# Patient Record
Sex: MALE | Race: WHITE | NOT HISPANIC OR LATINO | URBAN - METROPOLITAN AREA
[De-identification: names, ages, dates, MRNs, and addresses within clinical notes are randomized per-mention and may not be internally consistent; named-entity substitution may affect disease eponyms.]

---

## 2022-12-16 ENCOUNTER — OFFICE VISIT (OUTPATIENT)
Dept: FAMILY MEDICINE | Facility: CLINIC | Age: 72
End: 2022-12-16
Payer: MEDICARE

## 2022-12-16 VITALS
OXYGEN SATURATION: 96 % | DIASTOLIC BLOOD PRESSURE: 76 MMHG | SYSTOLIC BLOOD PRESSURE: 116 MMHG | WEIGHT: 206.81 LBS | HEIGHT: 71 IN | BODY MASS INDEX: 28.95 KG/M2 | HEART RATE: 73 BPM

## 2022-12-16 DIAGNOSIS — K21.9 GASTROESOPHAGEAL REFLUX DISEASE, UNSPECIFIED WHETHER ESOPHAGITIS PRESENT: ICD-10-CM

## 2022-12-16 DIAGNOSIS — M62.838 MUSCLE SPASM: ICD-10-CM

## 2022-12-16 DIAGNOSIS — E78.2 MIXED HYPERLIPIDEMIA: ICD-10-CM

## 2022-12-16 DIAGNOSIS — M72.2 PLANTAR FASCIITIS OF LEFT FOOT: ICD-10-CM

## 2022-12-16 DIAGNOSIS — G89.29 CHRONIC PAIN OF RIGHT KNEE: ICD-10-CM

## 2022-12-16 DIAGNOSIS — I10 ESSENTIAL HYPERTENSION: Primary | ICD-10-CM

## 2022-12-16 DIAGNOSIS — M25.561 CHRONIC PAIN OF RIGHT KNEE: ICD-10-CM

## 2022-12-16 DIAGNOSIS — L98.9 SKIN LESION OF CHEEK: ICD-10-CM

## 2022-12-16 PROCEDURE — 99204 OFFICE O/P NEW MOD 45 MIN: CPT | Mod: S$PBB,,, | Performed by: PHYSICIAN ASSISTANT

## 2022-12-16 PROCEDURE — 99205 OFFICE O/P NEW HI 60 MIN: CPT | Mod: PBBFAC,PO | Performed by: PHYSICIAN ASSISTANT

## 2022-12-16 PROCEDURE — 99204 PR OFFICE/OUTPT VISIT, NEW, LEVL IV, 45-59 MIN: ICD-10-PCS | Mod: S$PBB,,, | Performed by: PHYSICIAN ASSISTANT

## 2022-12-16 PROCEDURE — 99999 PR PBB SHADOW E&M-NEW PATIENT-LVL V: CPT | Mod: PBBFAC,,, | Performed by: PHYSICIAN ASSISTANT

## 2022-12-16 PROCEDURE — 99999 PR PBB SHADOW E&M-NEW PATIENT-LVL V: ICD-10-PCS | Mod: PBBFAC,,, | Performed by: PHYSICIAN ASSISTANT

## 2022-12-16 RX ORDER — FUROSEMIDE 40 MG/1
TABLET ORAL
COMMUNITY
End: 2023-01-27 | Stop reason: SDUPTHER

## 2022-12-16 RX ORDER — PANTOPRAZOLE SODIUM 40 MG/1
40 TABLET, DELAYED RELEASE ORAL
COMMUNITY
End: 2023-01-27 | Stop reason: SDUPTHER

## 2022-12-16 RX ORDER — TIZANIDINE 4 MG/1
4 TABLET ORAL
COMMUNITY
End: 2022-12-16 | Stop reason: SDUPTHER

## 2022-12-16 RX ORDER — TIZANIDINE 4 MG/1
4 TABLET ORAL NIGHTLY PRN
Qty: 30 TABLET | Refills: 0 | Status: SHIPPED | OUTPATIENT
Start: 2022-12-16 | End: 2023-04-24 | Stop reason: SDUPTHER

## 2022-12-16 RX ORDER — ATORVASTATIN CALCIUM 20 MG/1
20 TABLET, FILM COATED ORAL
COMMUNITY
End: 2023-01-27 | Stop reason: SDUPTHER

## 2022-12-16 NOTE — Clinical Note
Call patient and try and get clarity of blood pressure medication, specifically losartan.  Also, go warn Dr. Watson's staff of his slowness, so they can schedule him before an indirect or give him 40 minutes. Thanks, Margarita Duran PA-C

## 2022-12-16 NOTE — PROGRESS NOTES
"Subjective:      Patient ID: Dayton Faye is a 72 y.o. male.    Chief Complaint: Establish Care (Needs new pcp)    Patient is new to me and clinic.    HPI  Patient here to establish care and moved here recently from [place].  Dr. Alfie Mclean was his past PCP.    HTN-controlled with losartan and lasix 40mg prn  GERD-controlled.  HLD-taking lipitor 20mg  Possibly diabetes.  Muscle spasms-takes tizanidine nightly and ultram prn.    Patient reports new skin lesion on right cheek.  Has seen dermatology in the past.    Plantar fasciitis of left heel followed by podiatry in the past.    Reports continued right knee pain.  Ortho has evaluated this in the past.    Reviewed past medical, surgical, social, and family history with patient.    Review of Systems   Constitutional:  Negative for chills and fever.   Respiratory:  Negative for shortness of breath.    Cardiovascular:  Positive for leg swelling. Negative for chest pain.   Gastrointestinal:  Negative for abdominal pain, constipation, diarrhea, nausea and vomiting.   Musculoskeletal:  Positive for back pain (intermittent).        Left plantar fasciitis.  Right knee pain.   Skin:         Right cheek skin lesion.       Objective:   /76   Pulse 73   Ht 5' 11" (1.803 m)   Wt 93.8 kg (206 lb 12.7 oz)   SpO2 96%   BMI 28.84 kg/m²     Physical Exam  Vitals reviewed.   Constitutional:       Appearance: Normal appearance. He is well-developed and well-groomed.   HENT:      Head: Normocephalic and atraumatic.      Right Ear: Hearing and external ear normal.      Left Ear: Hearing and external ear normal.      Nose: Nose normal.      Mouth/Throat:      Lips: Pink.   Eyes:      General: Lids are normal.      Conjunctiva/sclera: Conjunctivae normal.   Cardiovascular:      Rate and Rhythm: Normal rate and regular rhythm.      Heart sounds: Normal heart sounds. No murmur heard.    No friction rub. No gallop.   Pulmonary:      Effort: Pulmonary effort is normal. No respiratory " distress.      Breath sounds: Normal breath sounds. No decreased breath sounds, wheezing, rhonchi or rales.   Musculoskeletal:      Comments: Ambulates with cane.   Skin:     General: Skin is warm and dry.      Findings: No rash.   Neurological:      General: No focal deficit present.      Mental Status: He is alert and oriented to person, place, and time.   Psychiatric:         Mood and Affect: Mood normal.         Behavior: Behavior normal. Behavior is cooperative.     Assessment:      1. Essential hypertension    2. Mixed hyperlipidemia    3. Skin lesion of cheek    4. Gastroesophageal reflux disease, unspecified whether esophagitis present    5. Plantar fasciitis of left foot    6. Chronic pain of right knee    7. Muscle spasm       Plan:   1. Essential hypertension  Controlled, but will have to call patient to check dosages of medication.    2. Mixed hyperlipidemia  Will obtain records from Dr. Mclean's office.    3. Skin lesion of cheek  - Ambulatory referral/consult to Dermatology; Future    4. Gastroesophageal reflux disease, unspecified whether esophagitis present  Controlled.    5. Plantar fasciitis of left foot  - Ambulatory referral/consult to Podiatry; Future    6. Chronic pain of right knee  - Ambulatory referral/consult to Orthopedics; Future    7. Muscle spasm  Controlled.  - tiZANidine (ZANAFLEX) 4 MG tablet; Take 1 tablet (4 mg total) by mouth nightly as needed (muscle spasms).  Dispense: 30 tablet; Refill: 0    Follow up in 6 weeks with new PCP.  Patient agreed with plan and expressed understanding.    Thank you for allowing me to serve you,

## 2022-12-22 PROBLEM — M62.838 MUSCLE SPASM: Status: ACTIVE | Noted: 2022-12-22

## 2022-12-22 PROBLEM — M25.561 CHRONIC PAIN OF RIGHT KNEE: Status: ACTIVE | Noted: 2022-12-22

## 2022-12-22 PROBLEM — G89.29 CHRONIC PAIN OF RIGHT KNEE: Status: ACTIVE | Noted: 2022-12-22

## 2022-12-22 PROBLEM — I10 ESSENTIAL HYPERTENSION: Status: ACTIVE | Noted: 2022-12-22

## 2022-12-22 PROBLEM — E78.2 MIXED HYPERLIPIDEMIA: Status: ACTIVE | Noted: 2022-12-22

## 2022-12-22 PROBLEM — K21.9 GASTROESOPHAGEAL REFLUX DISEASE: Status: ACTIVE | Noted: 2022-12-22

## 2022-12-22 PROBLEM — M72.2 PLANTAR FASCIITIS OF LEFT FOOT: Status: ACTIVE | Noted: 2022-12-22

## 2022-12-28 DIAGNOSIS — M25.561 RIGHT KNEE PAIN, UNSPECIFIED CHRONICITY: Primary | ICD-10-CM

## 2023-01-04 ENCOUNTER — HOSPITAL ENCOUNTER (OUTPATIENT)
Dept: RADIOLOGY | Facility: HOSPITAL | Age: 73
Discharge: HOME | End: 2023-01-04
Attending: ORTHOPAEDIC SURGERY
Payer: MEDICARE

## 2023-01-04 ENCOUNTER — OFFICE VISIT (OUTPATIENT)
Dept: ORTHOPEDICS | Facility: CLINIC | Age: 73
End: 2023-01-04
Payer: MEDICARE

## 2023-01-04 VITALS — HEIGHT: 71 IN | WEIGHT: 206.81 LBS | BODY MASS INDEX: 28.95 KG/M2

## 2023-01-04 DIAGNOSIS — M17.10 ARTHRITIS OF KNEE: Primary | ICD-10-CM

## 2023-01-04 DIAGNOSIS — M25.561 RIGHT KNEE PAIN, UNSPECIFIED CHRONICITY: ICD-10-CM

## 2023-01-04 DIAGNOSIS — M25.561 CHRONIC PAIN OF RIGHT KNEE: ICD-10-CM

## 2023-01-04 DIAGNOSIS — Z96.652 STATUS POST TOTAL LEFT KNEE REPLACEMENT: ICD-10-CM

## 2023-01-04 DIAGNOSIS — G89.29 CHRONIC PAIN OF RIGHT KNEE: ICD-10-CM

## 2023-01-04 PROCEDURE — 73562 XR KNEE ORTHO RIGHT WITH FLEXION: ICD-10-PCS | Mod: 26,LT,, | Performed by: RADIOLOGY

## 2023-01-04 PROCEDURE — 20610 DRAIN/INJ JOINT/BURSA W/O US: CPT | Mod: PBBFAC,PN,RT | Performed by: ORTHOPAEDIC SURGERY

## 2023-01-04 PROCEDURE — 73564 XR KNEE ORTHO RIGHT WITH FLEXION: ICD-10-PCS | Mod: 26,RT,, | Performed by: RADIOLOGY

## 2023-01-04 PROCEDURE — 99204 OFFICE O/P NEW MOD 45 MIN: CPT | Mod: 25,S$PBB,, | Performed by: ORTHOPAEDIC SURGERY

## 2023-01-04 PROCEDURE — 73564 X-RAY EXAM KNEE 4 OR MORE: CPT | Mod: 26,RT,, | Performed by: RADIOLOGY

## 2023-01-04 PROCEDURE — 99204 PR OFFICE/OUTPT VISIT, NEW, LEVL IV, 45-59 MIN: ICD-10-PCS | Mod: 25,S$PBB,, | Performed by: ORTHOPAEDIC SURGERY

## 2023-01-04 PROCEDURE — 99999 PR PBB SHADOW E&M-EST. PATIENT-LVL III: CPT | Mod: PBBFAC,,, | Performed by: ORTHOPAEDIC SURGERY

## 2023-01-04 PROCEDURE — 73564 X-RAY EXAM KNEE 4 OR MORE: CPT | Mod: TC,PO,RT

## 2023-01-04 PROCEDURE — 99999 PR PBB SHADOW E&M-EST. PATIENT-LVL III: ICD-10-PCS | Mod: PBBFAC,,, | Performed by: ORTHOPAEDIC SURGERY

## 2023-01-04 PROCEDURE — 20610 LARGE JOINT ASPIRATION/INJECTION: R KNEE: ICD-10-PCS | Mod: S$PBB,RT,, | Performed by: ORTHOPAEDIC SURGERY

## 2023-01-04 PROCEDURE — 73562 X-RAY EXAM OF KNEE 3: CPT | Mod: 26,LT,, | Performed by: RADIOLOGY

## 2023-01-04 PROCEDURE — 99213 OFFICE O/P EST LOW 20 MIN: CPT | Mod: PBBFAC,PN,25 | Performed by: ORTHOPAEDIC SURGERY

## 2023-01-04 RX ORDER — ACETAMINOPHEN 500 MG
TABLET ORAL DAILY
COMMUNITY

## 2023-01-04 RX ORDER — LORATADINE AND PSEUDOEPHEDRINE SULFATE 5; 120 MG/1; MG/1
TABLET, EXTENDED RELEASE ORAL DAILY
COMMUNITY
End: 2023-01-27 | Stop reason: SDUPTHER

## 2023-01-04 RX ORDER — ASPIRIN 81 MG/1
81 TABLET ORAL
COMMUNITY
Start: 2022-06-07 | End: 2023-01-27

## 2023-01-04 RX ORDER — OXYCODONE HYDROCHLORIDE 10 MG/1
5 TABLET ORAL
COMMUNITY
Start: 2022-06-07 | End: 2023-01-27

## 2023-01-04 RX ORDER — TRIAMCINOLONE ACETONIDE 40 MG/ML
40 INJECTION, SUSPENSION INTRA-ARTICULAR; INTRAMUSCULAR
Status: DISCONTINUED | OUTPATIENT
Start: 2023-01-04 | End: 2023-01-04 | Stop reason: HOSPADM

## 2023-01-04 RX ORDER — AMLODIPINE BESYLATE 10 MG/1
1 TABLET ORAL DAILY
COMMUNITY
Start: 2022-06-08 | End: 2023-01-27

## 2023-01-04 RX ORDER — ACETAMINOPHEN 500 MG
2 TABLET ORAL EVERY 8 HOURS
COMMUNITY
Start: 2022-06-07 | End: 2023-01-27

## 2023-01-04 RX ADMIN — TRIAMCINOLONE ACETONIDE 40 MG: 40 INJECTION, SUSPENSION INTRA-ARTICULAR; INTRAMUSCULAR at 02:01

## 2023-01-04 NOTE — PROGRESS NOTES
"Past Medical History:   Diagnosis Date    GERD (gastroesophageal reflux disease)     Hyperlipidemia     Hypertension        Past Surgical History:   Procedure Laterality Date    EYE SURGERY Left     HIP SURGERY Right     KNEE SURGERY Left     SHOULDER SURGERY Right     SHOULDER SURGERY Left     SPINE SURGERY      lumbar fusion       Current Outpatient Medications   Medication Sig    acetaminophen (TYLENOL) 500 MG tablet Take 2 tablets by mouth every 8 (eight) hours.    amLODIPine (NORVASC) 10 MG tablet Take 1 tablet by mouth once daily.    aspirin (ECOTRIN) 81 MG EC tablet Take 81 mg by mouth.    oxyCODONE (ROXICODONE) 10 mg Tab immediate release tablet Take 5 mg by mouth.    atorvastatin (LIPITOR) 20 MG tablet Take 20 mg by mouth.    furosemide (LASIX) 40 MG tablet     ginkgo biloba 120 mg Tab Take by mouth once daily.    krill-om-3-dha-epa-phospho-ast 467-708-89-64 mg Cap Take by mouth once daily.    loratadine-pseudoephedrine 5-120 mg (CLARITIN-D 12 HOUR) 5-120 mg per tablet Take by mouth once daily.    losartan potassium (LOSARTAN ORAL)     mv-min-folic acid-lutein 500-250 mcg Chew Take by mouth.    pantoprazole (PROTONIX) 40 MG tablet Take 40 mg by mouth.    prednisoLONE acetate (PRED MILD) 0.12 % ophthalmic suspension Apply 1 drop to eye.    tiZANidine (ZANAFLEX) 4 MG tablet Take 1 tablet (4 mg total) by mouth nightly as needed (muscle spasms).     No current facility-administered medications for this visit.       Review of patient's allergies indicates:   Allergen Reactions    Adhesive Rash     Patients states "surgical tape."    Latex, natural rubber Rash       Family History   Problem Relation Age of Onset    Heart disease Father     Arthritis Father     No Known Problems Brother     No Known Problems Sister        Social History     Socioeconomic History    Marital status:    Tobacco Use    Smoking status: Former     Packs/day: 0.50     Years: 5.00     Pack years: 2.50     Types: Cigarettes     " Quit date:      Years since quittin.0    Smokeless tobacco: Never   Substance and Sexual Activity    Alcohol use: Yes     Comment: rarely    Drug use: Never   Social History Narrative    Lives with female partner.  Moved here from [place].       Chief Complaint:   Chief Complaint   Patient presents with    Right Knee - Pain, Initial Visit       History of present illness:  72-year-old male with a history of osteoarthritis throughout his body.  Patient had a previous left total knee done on 2022 while he lived in [place].  Patient recently moved to [place] in December.  His right knee gives him significant pain.  Pain is a 3/10.  He is had cortisone injections and viscosupplementation in the past but nothing recently.      Answers submitted by the patient for this visit:  Orthopedics Questionnaire (Submitted on 2022)  unexpected weight change: No  appetite change : No  sleep disturbance: No  IMMUNOCOMPROMISED: No  nervous/ anxious: No  dysphoric mood: No  rash: No  visual disturbance: No  eye redness: No  eye pain: No  ear pain: No  tinnitus: Yes  hearing loss: Yes  sinus pressure : No  nosebleeds: No  enviro allergies: Yes  food allergies: No  cough: No  shortness of breath: No  sweating: Yes  frequency: Yes  difficulty urinating: No  hematuria: No  chest pain: No  palpitations: No  nausea: No  vomiting: No  diarrhea: No  blood in stool: No  constipation: No  headaches: No  dizziness: No  numbness: Yes  seizures: No  joint swelling: No  myalgia: Yes  weakness: Yes  back pain: Yes   (Submitted on 2022)  Chief Complaint: Leg pain  Pain Chronicity: chronic  History of trauma: No  Onset: more than 1 month ago  Frequency: daily  Progression since onset: waxing and waning  Injury mechanism: pulling  injury location: at home  pain- numeric: 6/10  pain location: right knee, left foot  pain quality: burning  Radiating Pain: No  Aggravating factors: bearing weight, standing, sitting  fever:  No  inability to bear weight: Yes  itching: No  joint locking: No  limited range of motion: No  stiffness: No  tingling: Yes  Treatments tried: brace/corset, cold, injection treatment, rest  physical therapy: not tried  Improvement on treatment: moderate      Physical Examination:    Vital Signs:  There were no vitals filed for this visit.    Body mass index is 28.84 kg/m².    This a well-developed, well nourished patient in no acute distress.  They are alert and oriented and cooperative to examination.  Pt. walks without an antalgic gait.      Examination of the right knee shows no rashes or erythema. There are no masses ecchymosis or effusion. Patient has full range of motion from 0-130°. Patient is mildly tender to palpation over lateral joint line and moderately tender to palpation over the medial joint line. Patient has a - Lachman exam, - anterior drawer exam, and - posterior drawer exam. - Jair's exam. Knee is stable to varus and valgus stress. 5 out of 5 motor strength. Palpable distal pulses. Intact light touch sensation. Negative Patellofemoral crepitus      X-rays:  X-rays of the right knee are ordered and reviewed which show severe lateral compartment narrowing of the right knee.  Prior left total knee arthroplasty without obvious complications.     Assessment::  Right valgus knee arthritis    Plan:  I reviewed the findings with him today.  Patient is very familiar with his arthritis.  We talked about treatment options ranging from injections to knee replacement.  Would like to hold off on knee replacement until possibly the summer.  Agreed to inject his knee today with cortisone.  Talked about doing viscosupplementation again if he would does desires.    This note was created using [employer] voice recognition software that occasionally misinterpreted phrases or words.    Consult note is delivered via Epic messaging service.

## 2023-01-04 NOTE — PROCEDURES
Large Joint Aspiration/Injection: R knee    Date/Time: 1/4/2023 2:15 PM  Performed by: Fili Scott MD  Authorized by: Fili Scott MD     Consent Done?:  Yes (Verbal)  Indications:  Pain  Site marked: the procedure site was marked    Timeout: prior to procedure the correct patient, procedure, and site was verified    Local anesthetic:  Lidocaine 1% without epinephrine and bupivacaine 0.25% without epinephrine  Anesthetic total (ml):  6      Details:  Needle Size:  20 G  Approach:  Anterolateral  Location:  Knee  Site:  R knee  Medications:  40 mg triamcinolone acetonide 40 mg/mL  Patient tolerance:  Patient tolerated the procedure well with no immediate complications

## 2023-01-10 ENCOUNTER — OFFICE VISIT (OUTPATIENT)
Dept: PODIATRY | Facility: CLINIC | Age: 73
End: 2023-01-10
Payer: MEDICARE

## 2023-01-10 DIAGNOSIS — M72.2 PLANTAR FASCIITIS OF LEFT FOOT: ICD-10-CM

## 2023-01-10 PROCEDURE — 99999 PR PBB SHADOW E&M-EST. PATIENT-LVL IV: ICD-10-PCS | Mod: PBBFAC,,, | Performed by: PODIATRIST

## 2023-01-10 PROCEDURE — 99214 OFFICE O/P EST MOD 30 MIN: CPT | Mod: PBBFAC,PN | Performed by: PODIATRIST

## 2023-01-10 PROCEDURE — 99999 PR PBB SHADOW E&M-EST. PATIENT-LVL IV: CPT | Mod: PBBFAC,,, | Performed by: PODIATRIST

## 2023-01-10 PROCEDURE — 99203 PR OFFICE/OUTPT VISIT, NEW, LEVL III, 30-44 MIN: ICD-10-PCS | Mod: S$PBB,,, | Performed by: PODIATRIST

## 2023-01-10 PROCEDURE — 99203 OFFICE O/P NEW LOW 30 MIN: CPT | Mod: S$PBB,,, | Performed by: PODIATRIST

## 2023-01-10 RX ORDER — EPINEPHRINE 0.22MG
200 AEROSOL WITH ADAPTER (ML) INHALATION
COMMUNITY

## 2023-01-10 NOTE — PATIENT INSTRUCTIONS
1. Stretch calf and plantar fascia at least 3x per day for 30 sec and before getting out of bed.    2. Supportive shoes at all times (athletic shoe including coronel Beast wide, new balance 980 or 1080 wide,  HOKA bondi in extra wide or casual shoes like Dansko, Loren, Naot, Vionoic, Fit flop clog or wedge with extra heel padding and arch support. For house slippers would recommend Fitflop or Spenco found on amazon.com, Never walk barefoot or in flats.    (Varsity sports, Phidippides, [place] running company, Masseys, Goodfeet, Cantilever, Feet First, Foot Solutions, Therapeutic shoes, LoftyVistas, [facility] [employer] [facility] pro shop) http://[email or web address]    3. Orthotic (recommend the following brands: Superfeet, Spenco, Powerstep, Sof Sole Fit Series)                  4. NSAID's for 2-3 weeks if no GI or Kidney problems (example: ibuprofen 600 mg 2 x per day)    5. ICE massage with frozen water bottle 2x per day for 15 minutes.    6. Consider night splint, custom orthotics, therapy and/or steroid injection    What Is Plantar Fasciitis?   The plantar fascia is a ligament-like band running from your heel to the ball of your foot. This band pulls on the heel bone, raising the arch of your foot as it pushes off the ground. But if your foot moves incorrectly, the plantar fascia may become strained. The fascia may swell and its tiny fibers may begin to fray, causing plantar fasciitis.  Causes  Plantar fasciitis is often caused by poor foot mechanics. If your foot flattens too much, the fascia may overstretch and swell. If your foot flattens too little, the fascia may ache from being pulled too tight.    The plantar fascia is a thick, fibrous layer of tissue that covers the bones on the bottom of your foot. It holds the foot bones in an arched position. Plantar fasciitis is a painful swelling of the plantar fascia.  A heel spur is an overgrowth of bone where the plantar fascia attaches to the heel bone. The heel spur itself  usually doesnt cause pain. However, the heel spur might be a sign of plantar fasciitis which may cause your foot pain. There is no specific treatment for heel spurs.   Plantar fasciitis can develop slowly or suddenly. It usually affects one foot at a time. Heel pain can feel sharp, like a knife sticking into the bottom of your foot. You may feel pain after exercising, long-distance jogging, stair climbing, long periods of standing, or after standing up.  Risk factors for plantar fasciitis include: arthritis, diabetes, obesity or recent weight gain, flat foot, and having high arches. Wearing high heels, loose shoes, or shoes with poor arch support adds to the risk.    Foot pain is usually worse in the morning. But it improves with walking. By the end of the day there may be a dull aching. Treatment includes short-term rest and controlling inflammation. It may take up to 9 months before all symptoms go away. In rare cases, a steroid injection in the foot, or surgery, may be needed.  Home care  If you are overweight, lose weight to help healing.  Choose supportive shoes with good arch support and shock absorbency. Replace athletic shoes when they become worn out. Dont walk or run barefoot.  Premade or custom-fitted shoe inserts may be helpful. Inserts made of silicone seem to be the most effective. Custom-made inserts can be provided by a podiatrist or foot specialist, physical therapist, or orthopedist.  Premade or custom-made night splints keep the heel stretched out while you sleep. They may prevent morning pain.  Avoid activities that stress the feet: jogging, prolonged standing or walking, contact sports, etc.  First thing in the morning and before sports, stretch the bottom of your foot. Gently flex your ankle so the toes move toward your knee.  Icing may help control heel pain. Apply an ice pack to the heel for 10-20 minutes as a preventive. Or ice your heel after a severe flare-up of symptoms. You may repeat  this every 1-2 hours as needed.  You may use over-the-counter pain medicine to control pain, unless another medicine was prescribed. Anti-inflammatory pain medicines, such as ibuprofen or naproxen, may work better than acetaminophen. If you have chronic liver or kidney disease or ever had a stomach ulcer or GI bleeding, talk with your healthcare provider before using these medicines.  Shoe inserts, a night splint, or a special boot may be needed. Use these as directed by your healthcare provider.      Treating Plantar Fasciitis    First, your doctor relieves pain. Then, the cause of your problem may be found and corrected. If your pain is due to poor foot mechanics, custom-made shoe inserts (orthoses) may help.        Reduce Symptoms:  To relieve mild symptoms, try aspirin, ibuprofen, or other medications as directed. Rubbing ice on the affected area may also help.  To reduce severe pain and swelling, your doctor may prescribe pills or injections or a walking cast in some instances. Physical therapy, such as ultrasound or a daily stretching program, may also be recommended. Surgery is rarely required.  To reduce symptoms caused by poor foot mechanics, your foot may be taped. This supports the arch and temporarily controls movement. Night splints may also help by stretching the fascia.    Control Movement  If taping helps, your doctor may prescribe orthoses. Built from plaster casts of your feet, these inserts control the way your foot moves. As a result, your symptoms should go away.  If Surgery Is Needed  Your doctor may consider surgery if other types of treatment don't control your pain. During surgery, the plantar fascia is partially cut to release tension. As you heal, fibrous tissue fills the space between the heel bone and the plantar fascia.   Reduce Overuse  Every time your foot strikes the ground, the plantar fascia is stretched. You can reduce the strain on the plantar fascia and the possibility of  overuse by following these suggestions:  Lose any excess weight.  Avoid running on hard or uneven ground.  Use orthoses at all times in your shoes and house slippers.  © 7571-5585 [employer]. [address] [place] [address]. All rights reserved. This information is not intended as a substitute for professional medical care. Always follow your healthcare professional's instructions.            _                Lower Body Exercises: Calf Stretch    This exercise both stretches and strengthens your lower body to help your back. Do the exercise as often as suggested by your health care provider. As you work out, dont rush or strain. Use an exercise mat, pillow, or folded towel to protect your knees and other sensitive areas.  Face a wall 2 feet away. Step toward the wall with one foot.  Place both palms on the wall and bend your front knee.  Lean forward, keeping the back leg straight and the heel on the floor.  Hold for 20 seconds. Switch legs.  © 2860-8701 [employer]. [address] [place] [address]. All rights reserved. This information is not intended as a substitute for professional medical care. Always follow your healthcare professional's instructions.    These instructions are for your right foot. Switch sides for your left foot.  Sit in a chair. Rest your right ankle on your left knee.  Hold your toes with your right hand. Gently bend the toes backward. Feel a stretch in the undersides of the toes and ball of the foot. Hold for 30 to 60 seconds.  Then gently bend the toes in the other direction. Gently press on them until your foot is pointed. Hold for 30 to 60 seconds.  Repeat 5 times, or as instructed.  Date Last Reviewed: 5/1/2016  © 4711-1128 [employer]. [address] [place] [address]. All rights reserved. This information is not intended as a substitute for professional medical care. Always follow your healthcare professional's  instructions.

## 2023-01-11 DIAGNOSIS — I10 ESSENTIAL HYPERTENSION: ICD-10-CM

## 2023-01-25 ENCOUNTER — PATIENT MESSAGE (OUTPATIENT)
Dept: ADMINISTRATIVE | Facility: HOSPITAL | Age: 73
End: 2023-01-25
Payer: MEDICARE

## 2023-01-27 ENCOUNTER — LAB VISIT (OUTPATIENT)
Dept: LAB | Facility: HOSPITAL | Age: 73
End: 2023-01-27
Payer: MEDICARE

## 2023-01-27 ENCOUNTER — OFFICE VISIT (OUTPATIENT)
Dept: FAMILY MEDICINE | Facility: CLINIC | Age: 73
End: 2023-01-27
Payer: MEDICARE

## 2023-01-27 VITALS
SYSTOLIC BLOOD PRESSURE: 130 MMHG | HEART RATE: 76 BPM | HEIGHT: 71 IN | OXYGEN SATURATION: 96 % | BODY MASS INDEX: 28.73 KG/M2 | WEIGHT: 205.25 LBS | DIASTOLIC BLOOD PRESSURE: 80 MMHG

## 2023-01-27 DIAGNOSIS — J32.9 CHRONIC SINUSITIS, UNSPECIFIED LOCATION: ICD-10-CM

## 2023-01-27 DIAGNOSIS — R60.0 LOWER EXTREMITY EDEMA: ICD-10-CM

## 2023-01-27 DIAGNOSIS — I10 ESSENTIAL HYPERTENSION: ICD-10-CM

## 2023-01-27 DIAGNOSIS — Z00.00 WELLNESS EXAMINATION: Primary | ICD-10-CM

## 2023-01-27 DIAGNOSIS — E78.2 MIXED HYPERLIPIDEMIA: ICD-10-CM

## 2023-01-27 DIAGNOSIS — I10 PRIMARY HYPERTENSION: ICD-10-CM

## 2023-01-27 DIAGNOSIS — K21.9 GASTROESOPHAGEAL REFLUX DISEASE, UNSPECIFIED WHETHER ESOPHAGITIS PRESENT: ICD-10-CM

## 2023-01-27 DIAGNOSIS — Z00.00 WELLNESS EXAMINATION: ICD-10-CM

## 2023-01-27 DIAGNOSIS — R06.09 DYSPNEA ON EXERTION: ICD-10-CM

## 2023-01-27 PROCEDURE — 99387 INIT PM E/M NEW PAT 65+ YRS: CPT | Mod: S$PBB,GZ,, | Performed by: STUDENT IN AN ORGANIZED HEALTH CARE EDUCATION/TRAINING PROGRAM

## 2023-01-27 PROCEDURE — 80053 COMPREHEN METABOLIC PANEL: CPT | Performed by: STUDENT IN AN ORGANIZED HEALTH CARE EDUCATION/TRAINING PROGRAM

## 2023-01-27 PROCEDURE — 99213 OFFICE O/P EST LOW 20 MIN: CPT | Mod: PBBFAC,PO,25 | Performed by: STUDENT IN AN ORGANIZED HEALTH CARE EDUCATION/TRAINING PROGRAM

## 2023-01-27 PROCEDURE — 80061 LIPID PANEL: CPT | Performed by: STUDENT IN AN ORGANIZED HEALTH CARE EDUCATION/TRAINING PROGRAM

## 2023-01-27 PROCEDURE — 87389 HIV-1 AG W/HIV-1&-2 AB AG IA: CPT | Performed by: STUDENT IN AN ORGANIZED HEALTH CARE EDUCATION/TRAINING PROGRAM

## 2023-01-27 PROCEDURE — 99387 PR PREVENTIVE VISIT,NEW,65 & OVER: ICD-10-PCS | Mod: S$PBB,GZ,, | Performed by: STUDENT IN AN ORGANIZED HEALTH CARE EDUCATION/TRAINING PROGRAM

## 2023-01-27 PROCEDURE — 36415 COLL VENOUS BLD VENIPUNCTURE: CPT | Mod: PO | Performed by: STUDENT IN AN ORGANIZED HEALTH CARE EDUCATION/TRAINING PROGRAM

## 2023-01-27 PROCEDURE — 99999 PR PBB SHADOW E&M-EST. PATIENT-LVL III: CPT | Mod: PBBFAC,,, | Performed by: STUDENT IN AN ORGANIZED HEALTH CARE EDUCATION/TRAINING PROGRAM

## 2023-01-27 PROCEDURE — G0008 ADMIN INFLUENZA VIRUS VAC: HCPCS | Mod: PBBFAC,PO

## 2023-01-27 PROCEDURE — 99999 PR PBB SHADOW E&M-EST. PATIENT-LVL III: ICD-10-PCS | Mod: PBBFAC,,, | Performed by: STUDENT IN AN ORGANIZED HEALTH CARE EDUCATION/TRAINING PROGRAM

## 2023-01-27 PROCEDURE — 86803 HEPATITIS C AB TEST: CPT | Performed by: STUDENT IN AN ORGANIZED HEALTH CARE EDUCATION/TRAINING PROGRAM

## 2023-01-27 RX ORDER — PANTOPRAZOLE SODIUM 40 MG/1
40 TABLET, DELAYED RELEASE ORAL DAILY
Qty: 90 TABLET | Refills: 3 | Status: SHIPPED | OUTPATIENT
Start: 2023-01-27

## 2023-01-27 RX ORDER — ATORVASTATIN CALCIUM 20 MG/1
20 TABLET, FILM COATED ORAL DAILY
Qty: 90 TABLET | Refills: 3 | Status: SHIPPED | OUTPATIENT
Start: 2023-01-27 | End: 2024-03-04

## 2023-01-27 RX ORDER — LORATADINE AND PSEUDOEPHEDRINE SULFATE 5; 120 MG/1; MG/1
1 TABLET, EXTENDED RELEASE ORAL DAILY
Qty: 90 TABLET | Refills: 1 | Status: SHIPPED | OUTPATIENT
Start: 2023-01-27 | End: 2023-04-11

## 2023-01-27 RX ORDER — FUROSEMIDE 40 MG/1
40 TABLET ORAL DAILY PRN
Qty: 90 TABLET | Refills: 3 | Status: SHIPPED | OUTPATIENT
Start: 2023-01-27 | End: 2024-02-06

## 2023-01-27 RX ORDER — LOSARTAN POTASSIUM 100 MG/1
100 TABLET ORAL DAILY
Qty: 90 TABLET | Refills: 3 | Status: SHIPPED | OUTPATIENT
Start: 2023-01-27 | End: 2023-09-06 | Stop reason: SDUPTHER

## 2023-01-27 NOTE — ASSESSMENT & PLAN NOTE
Currently stable. I discussed the risks of long term pseudoephedrine use. Patient wishes to continue Claritin D. Encouraged to use nasal steroid and sinus rinses.

## 2023-01-27 NOTE — PROGRESS NOTES
Name: Dayton Faye  MRN: [id number]  : 1950    HPI  Patient is here is to establish care. No acute concerns.    Patient has a cardiologist from [place] where he previously lived who wanted to perform an exercise stress test for dyspnea on exertion. Patient would have to drive back to [place] for this test but was hoping to get it done here.     Review of Systems   Constitutional:  Negative for chills and fever.   HENT:  Positive for hearing loss (uses hearing aids).    Eyes:  Positive for visual disturbance (chronic, unchanged).   Respiratory:  Negative for cough and shortness of breath.    Cardiovascular:  Negative for chest pain.   Gastrointestinal:  Negative for constipation, diarrhea, nausea and vomiting.   Genitourinary:  Negative for difficulty urinating.   Musculoskeletal:  Positive for arthralgias and back pain.   Skin:  Negative for color change and rash.   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 Negative for dizziness, weakness and light-headedness.   Psychiatric/Behavioral:  Negative for dysphoric mood and sleep disturbance.       Patient Active Problem List   Diagnosis    Essential hypertension    Mixed hyperlipidemia    Gastroesophageal reflux disease    Plantar fasciitis of left foot    Chronic pain of right knee    Muscle spasm       Current Outpatient Medications on File Prior to Visit   Medication Sig Dispense Refill    amLODIPine (NORVASC) 10 MG tablet Take 1 tablet by mouth once daily.      atorvastatin (LIPITOR) 20 MG tablet Take 20 mg by mouth.      coenzyme Q10 100 mg capsule Take 200 mg by mouth.      furosemide (LASIX) 40 MG tablet       ginkgo biloba 120 mg Tab Take by mouth once daily.      krill-om-3-dha-epa-phospho-ast 520-275-80-64 mg Cap Take by mouth once daily.      loratadine-pseudoephedrine 5-120 mg (CLARITIN-D 12 HOUR) 5-120 mg per tablet Take by mouth once  daily.      losartan potassium (LOSARTAN ORAL)       mv-min-folic acid-lutein 500-250 mcg Chew Take by mouth.      pantoprazole (PROTONIX) 40 MG tablet Take 40 mg by mouth.      prednisoLONE acetate (PRED MILD) 0.12 % ophthalmic suspension Apply 1 drop to eye.      tiZANidine (ZANAFLEX) 4 MG tablet Take 1 tablet (4 mg total) by mouth nightly as needed (muscle spasms). 30 tablet 0    vit C/E/Zn/coppr/lutein/zeaxan (PRESERVISION AREDS-2 ORAL) Take by mouth 2 (two) times a day.      oxyCODONE (ROXICODONE) 10 mg Tab immediate release tablet Take 5 mg by mouth.      [DISCONTINUED] acetaminophen (TYLENOL) 500 MG tablet Take 2 tablets by mouth every 8 (eight) hours.      [DISCONTINUED] aspirin (ECOTRIN) 81 MG EC tablet Take 81 mg by mouth.       No current facility-administered medications on file prior to visit.       Past Medical History:   Diagnosis Date    GERD (gastroesophageal reflux disease)     Hyperlipidemia     Hypertension        Past Surgical History:   Procedure Laterality Date    EYE SURGERY Left     HIP SURGERY Right     JOINT REPLACEMENT      Right hip joint replaced    KNEE SURGERY Left     SHOULDER SURGERY Right     SHOULDER SURGERY Left     SPINE SURGERY      lumbar fusion        Family History   Problem Relation Age of Onset    Heart disease Father     Arthritis Father          at age 92    Hypertension Father     Vision loss Father     No Known Problems Brother     No Known Problems Sister     Cancer Paternal Grandfather         Prostate    Cancer Paternal Grandmother         Lung       Social History     Socioeconomic History    Marital status:    Tobacco Use    Smoking status: Former     Packs/day: 0.50     Years: 5.00     Pack years: 2.50     Types: Cigarettes, Pipe     Quit date: 1991     Years since quittin.0    Smokeless tobacco: Never   Substance and Sexual Activity    Alcohol use: Not Currently     Comment: Very occasionally    Drug use: Never    Sexual activity: Yes     " Partners: Female     Birth control/protection: Post-menopausal   Social History Narrative    Lives with female partner.  Moved here from [place].       Review of patient's allergies indicates:   Allergen Reactions    Adhesive Rash     Patients states "surgical tape."    Latex, natural rubber Rash        Vitals:    01/27/23 1102   BP: 130/80   Pulse: 76        Physical Exam  Constitutional:       General: He is not in acute distress.     Appearance: Normal appearance. He is well-developed.   HENT:      Head: Normocephalic and atraumatic.      Right Ear: External ear normal.      Left Ear: External ear normal.   Eyes:      Conjunctiva/sclera: Conjunctivae normal.   Cardiovascular:      Rate and Rhythm: Normal rate and regular rhythm.      Heart sounds: No murmur heard.    No friction rub. No gallop.   Pulmonary:      Effort: Pulmonary effort is normal. No respiratory distress.      Breath sounds: No wheezing, rhonchi or rales.   Abdominal:      General: Abdomen is flat. There is no distension.   Musculoskeletal:         General: No swelling or deformity.      Right lower leg: No edema.      Left lower leg: No edema.   Skin:     General: Skin is warm and dry.      Coloration: Skin is not jaundiced.   Neurological:      Mental Status: He is alert and oriented to person, place, and time. Mental status is at baseline.   Psychiatric:         Attention and Perception: Attention and perception normal.         Mood and Affect: Mood normal.         Speech: Speech normal.         Behavior: Behavior normal. Behavior is cooperative.         Thought Content: Thought content normal.         Cognition and Memory: Cognition normal.         Judgment: Judgment normal.        1. Wellness examination  -     Basic Metabolic Panel; Future; Expected date: 01/27/2023  -     Hepatitis C Antibody; Future; Expected date: 01/27/2023  -     HIV 1/2 Ag/Ab (4th Gen); Future; Expected date: 01/27/2023  -     Lipid Panel; Future; Expected date: " 01/27/2023  -     (In Office Administered) Pneumococcal Conjugate Vaccine (20 Valent) (IM)    2. Essential hypertension  Assessment & Plan:  Stable. Continue current medications.    Orders:  -     losartan (COZAAR) 100 MG tablet; Take 1 tablet (100 mg total) by mouth once daily.  Dispense: 90 tablet; Refill: 3  -     Lipid Panel; Future; Expected date: 01/27/2023    3. Mixed hyperlipidemia  Assessment & Plan:  Presumed stable. Labs today.    Orders:  -     atorvastatin (LIPITOR) 20 MG tablet; Take 1 tablet (20 mg total) by mouth once daily.  Dispense: 90 tablet; Refill: 3    4. Gastroesophageal reflux disease, unspecified whether esophagitis present  Assessment & Plan:  Stable. Continue current medication.    Orders:  -     pantoprazole (PROTONIX) 40 MG tablet; Take 1 tablet (40 mg total) by mouth once daily.  Dispense: 90 tablet; Refill: 3    5. Chronic sinusitis, unspecified location  Assessment & Plan:  Currently stable. I discussed the risks of long term pseudoephedrine use. Patient wishes to continue Claritin D. Encouraged to use nasal steroid and sinus rinses.    Orders:  -     loratadine-pseudoephedrine 5-120 mg (CLARITIN-D 12 HOUR) 5-120 mg per tablet; Take 1 tablet by mouth once daily.  Dispense: 90 tablet; Refill: 1    6. Lower extremity edema  -     furosemide (LASIX) 40 MG tablet; Take 1 tablet (40 mg total) by mouth daily as needed (swelling).  Dispense: 90 tablet; Refill: 3    7. Dyspnea on exertion  -     Stress Echo Which stress agent will be used? Treadmill Exercise; Color Flow Doppler? No; Future        Follow up in one year    Thierno Watson MD  01/27/2023

## 2023-01-28 LAB
ALBUMIN SERPL BCP-MCNC: 3.9 G/DL (ref 3.5–5.2)
ALP SERPL-CCNC: 91 U/L (ref 55–135)
ALT SERPL W/O P-5'-P-CCNC: 21 U/L (ref 10–44)
ANION GAP SERPL CALC-SCNC: 8 MMOL/L (ref 8–16)
ANION GAP SERPL CALC-SCNC: 8 MMOL/L (ref 8–16)
AST SERPL-CCNC: 27 U/L (ref 10–40)
BILIRUB SERPL-MCNC: 0.5 MG/DL (ref 0.1–1)
BUN SERPL-MCNC: 23 MG/DL (ref 8–23)
BUN SERPL-MCNC: 23 MG/DL (ref 8–23)
CALCIUM SERPL-MCNC: 9.4 MG/DL (ref 8.7–10.5)
CALCIUM SERPL-MCNC: 9.4 MG/DL (ref 8.7–10.5)
CHLORIDE SERPL-SCNC: 107 MMOL/L (ref 95–110)
CHLORIDE SERPL-SCNC: 107 MMOL/L (ref 95–110)
CHOLEST SERPL-MCNC: 170 MG/DL (ref 120–199)
CHOLEST/HDLC SERPL: 3.5 {RATIO} (ref 2–5)
CO2 SERPL-SCNC: 25 MMOL/L (ref 23–29)
CO2 SERPL-SCNC: 25 MMOL/L (ref 23–29)
CREAT SERPL-MCNC: 1.6 MG/DL (ref 0.5–1.4)
CREAT SERPL-MCNC: 1.6 MG/DL (ref 0.5–1.4)
EST. GFR  (NO RACE VARIABLE): 45.5 ML/MIN/1.73 M^2
EST. GFR  (NO RACE VARIABLE): 45.5 ML/MIN/1.73 M^2
GLUCOSE SERPL-MCNC: 66 MG/DL (ref 70–110)
GLUCOSE SERPL-MCNC: 66 MG/DL (ref 70–110)
HCV AB SERPL QL IA: NORMAL
HDLC SERPL-MCNC: 48 MG/DL (ref 40–75)
HDLC SERPL: 28.2 % (ref 20–50)
HIV 1+2 AB+HIV1 P24 AG SERPL QL IA: NORMAL
LDLC SERPL CALC-MCNC: 100.8 MG/DL (ref 63–159)
NONHDLC SERPL-MCNC: 122 MG/DL
POTASSIUM SERPL-SCNC: 4.3 MMOL/L (ref 3.5–5.1)
POTASSIUM SERPL-SCNC: 4.3 MMOL/L (ref 3.5–5.1)
PROT SERPL-MCNC: 7.3 G/DL (ref 6–8.4)
SODIUM SERPL-SCNC: 140 MMOL/L (ref 136–145)
SODIUM SERPL-SCNC: 140 MMOL/L (ref 136–145)
TRIGL SERPL-MCNC: 106 MG/DL (ref 30–150)

## 2023-01-29 NOTE — PROGRESS NOTES
Subjective:      Patient ID: Dayton Faye is a 72 y.o. male.    Chief Complaint: Foot Pain (Plantar fascitis of L foot. Pt states he has noticed pain/discomfort for the past year. Pt has done PT prior to this and started noticing concerns with Plantar fascitis. Last seen by podiatry back in late November)    Dayton is a 72 y.o. male who presents to the clinic complaining of heel pain in the left foot, especially with the first step in the morning or after rest. The pain is described as Aching and Sharp. The onset of the pain was gradual and has worsened over the past several months. Dayton rates the pain as 2/10. He denies a history of trauma. Prior treatments include rest and has done PT but pain is not better.    Review of Systems   Constitutional: Negative for chills and fever.   Cardiovascular:  Negative for claudication and leg swelling.   Respiratory:  Negative for shortness of breath.    Skin:  Negative for itching, nail changes and rash.   Musculoskeletal:  Negative for muscle cramps, muscle weakness and myalgias.        Heel pain left   Gastrointestinal:  Negative for nausea and vomiting.   Neurological:  Negative for focal weakness, loss of balance, numbness and paresthesias.         Objective:      Physical Exam  Constitutional:       General: He is not in acute distress.     Appearance: He is well-developed. He is not diaphoretic.   Cardiovascular:      Pulses:           Dorsalis pedis pulses are 2+ on the right side and 2+ on the left side.        Posterior tibial pulses are 2+ on the right side and 2+ on the left side.      Comments: < 3 sec capillary refill time to toes 1-5 bilateral. Toes and feet are warm to touch proximally with normal distal cooling b/l. There is some hair growth on the feet and toes b/l. There is no edema b/l. No spider veins or varicosities present b/l.     Musculoskeletal:      Comments: Pain on palpation plantar medial and central heel left. No pain with ROM or MMT. No pain with  medial and lateral compression of heel.     Skin:     General: Skin is warm and dry.      Coloration: Skin is not pale.      Findings: No abrasion, bruising, burn, ecchymosis, erythema, laceration, lesion, petechiae or rash.      Nails: There is no clubbing.      Comments: Skin temperature, texture and turgor within normal limits.   Neurological:      Mental Status: He is alert and oriented to person, place, and time.      Sensory: No sensory deficit.      Motor: No tremor, atrophy or abnormal muscle tone.      Comments: Negative tinel sign bilateral.   Psychiatric:         Behavior: Behavior normal.             Assessment:       Encounter Diagnosis   Name Primary?    Plantar fasciitis of left foot          Plan:       Dayton was seen today for foot pain.    Diagnoses and all orders for this visit:    Plantar fasciitis of left foot  -     Ambulatory referral/consult to Podiatry      I counseled the patient on his conditions, their implications and medical management.    Patient will stretch the tendo achilles complex three times daily as demonstrated in the office.  Literature was dispensed illustrating proper stretching technique.    Patient will obtain over the counter arch supports and wear them in shoes whenever possible.  Athletic shoes intended for walking or running are usually best.    The patient is advised to reduce or avoid barefoot or flats with ambulation    Declined injection today    Patient instructed on adequate icing techniques. Patient should ice the affected area at least once per day x 10 minutes for 10 days . I advised the  patient that extra icing would also be beneficial to ensure adequate anti inflammatory effect     Return 6 weeks for follow up    Issa Merida DPM

## 2023-01-30 PROBLEM — N18.31 STAGE 3A CHRONIC KIDNEY DISEASE: Status: ACTIVE | Noted: 2023-01-30

## 2023-02-01 ENCOUNTER — PATIENT MESSAGE (OUTPATIENT)
Dept: ADMINISTRATIVE | Facility: HOSPITAL | Age: 73
End: 2023-02-01
Payer: MEDICARE

## 2023-02-07 ENCOUNTER — PATIENT MESSAGE (OUTPATIENT)
Dept: ADMINISTRATIVE | Facility: HOSPITAL | Age: 73
End: 2023-02-07
Payer: MEDICARE

## 2023-02-20 ENCOUNTER — OFFICE VISIT (OUTPATIENT)
Dept: PODIATRY | Facility: CLINIC | Age: 73
End: 2023-02-20
Payer: MEDICARE

## 2023-02-20 DIAGNOSIS — M72.2 PLANTAR FASCIITIS OF LEFT FOOT: Primary | ICD-10-CM

## 2023-02-20 DIAGNOSIS — R26.89 BALANCE PROBLEM: ICD-10-CM

## 2023-02-20 DIAGNOSIS — R53.83 DECREASED STAMINA: ICD-10-CM

## 2023-02-20 PROCEDURE — 99213 OFFICE O/P EST LOW 20 MIN: CPT | Mod: PBBFAC,PN | Performed by: PODIATRIST

## 2023-02-20 PROCEDURE — 99999 PR PBB SHADOW E&M-EST. PATIENT-LVL III: ICD-10-PCS | Mod: PBBFAC,,, | Performed by: PODIATRIST

## 2023-02-20 PROCEDURE — 99213 PR OFFICE/OUTPT VISIT, EST, LEVL III, 20-29 MIN: ICD-10-PCS | Mod: S$PBB,,, | Performed by: PODIATRIST

## 2023-02-20 PROCEDURE — 99999 PR PBB SHADOW E&M-EST. PATIENT-LVL III: CPT | Mod: PBBFAC,,, | Performed by: PODIATRIST

## 2023-02-20 PROCEDURE — 99213 OFFICE O/P EST LOW 20 MIN: CPT | Mod: S$PBB,,, | Performed by: PODIATRIST

## 2023-02-20 NOTE — PROGRESS NOTES
Subjective:      Patient ID: Dayton Faye is a 72 y.o. male.    Chief Complaint: Follow-up    Dayton is a 72 y.o. male who presents to the clinic complaining of heel pain in the left foot, especially with the first step in the morning or after rest. The pain is described as Aching and Sharp. The onset of the pain was gradual and has worsened over the past several months. Dayton rates the pain as 2/10. He denies a history of trauma. Prior treatments include rest and has done PT but pain is not better.    2/20/23: Patient returns has been doing stretching and got new shoes, feels much better but pain is still there and has some weakness and stamina issues with walking and standing    2/20/23: Patient returns for follow up left heel pain also relates to some imbalance at times    Review of Systems   Constitutional: Negative for chills and fever.   Cardiovascular:  Negative for claudication and leg swelling.   Respiratory:  Negative for shortness of breath.    Skin:  Negative for itching, nail changes and rash.   Musculoskeletal:  Negative for muscle cramps, muscle weakness and myalgias.        Heel pain left   Gastrointestinal:  Negative for nausea and vomiting.   Neurological:  Negative for focal weakness, loss of balance, numbness and paresthesias.         Objective:      Physical Exam  Constitutional:       General: He is not in acute distress.     Appearance: He is well-developed. He is not diaphoretic.   Cardiovascular:      Pulses:           Dorsalis pedis pulses are 2+ on the right side and 2+ on the left side.        Posterior tibial pulses are 2+ on the right side and 2+ on the left side.      Comments: < 3 sec capillary refill time to toes 1-5 bilateral. Toes and feet are warm to touch proximally with normal distal cooling b/l. There is some hair growth on the feet and toes b/l. There is no edema b/l. No spider veins or varicosities present b/l.     Musculoskeletal:      Comments: Pain on palpation plantar medial  and central heel left. No pain with ROM or MMT. No pain with medial and lateral compression of heel.     Skin:     General: Skin is warm and dry.      Coloration: Skin is not pale.      Findings: No abrasion, bruising, burn, ecchymosis, erythema, laceration, lesion, petechiae or rash.      Nails: There is no clubbing.      Comments: Skin temperature, texture and turgor within normal limits.   Neurological:      Mental Status: He is alert and oriented to person, place, and time.      Sensory: No sensory deficit.      Motor: No tremor, atrophy or abnormal muscle tone.      Comments: Negative tinel sign bilateral.   Psychiatric:         Behavior: Behavior normal.             Assessment:       Encounter Diagnoses   Name Primary?    Plantar fasciitis of left foot Yes    Decreased stamina     Balance problem          Plan:       Dayton was seen today for follow-up.    Diagnoses and all orders for this visit:    Plantar fasciitis of left foot  -     Ambulatory referral/consult to Physical/Occupational Therapy; Future    Decreased stamina  -     Ambulatory referral/consult to Physical/Occupational Therapy; Future    Balance problem  -     Ambulatory referral/consult to Physical/Occupational Therapy; Future    I counseled the patient on his conditions, their implications and medical management.    Patient will continue to stretch the tendo achilles complex three times daily as demonstrated in the office.  Literature was dispensed illustrating proper stretching technique.    The patient is advised to reduce or avoid barefoot or flats with ambulation        Will try PT again for the plantar fasciitis and the balance/stamina issues    Return 2 months for follow up    Issa Merida DPM

## 2023-02-27 ENCOUNTER — CLINICAL SUPPORT (OUTPATIENT)
Dept: REHABILITATION | Facility: HOSPITAL | Age: 73
End: 2023-02-27
Attending: PODIATRIST
Payer: MEDICARE

## 2023-02-27 DIAGNOSIS — M72.2 PLANTAR FASCIITIS OF LEFT FOOT: ICD-10-CM

## 2023-02-27 DIAGNOSIS — R53.83 DECREASED STAMINA: ICD-10-CM

## 2023-02-27 DIAGNOSIS — R26.89 BALANCE PROBLEM: ICD-10-CM

## 2023-02-27 PROCEDURE — 97110 THERAPEUTIC EXERCISES: CPT | Mod: PO

## 2023-02-27 NOTE — PLAN OF CARE
"[facility]   Physical Therapy Initial Evaluation     Date: 2/27/2023   Name: Dayton Faye  Clinic Number: [id number]    Therapy Diagnosis:   Encounter Diagnoses   Name Primary?    Plantar fasciitis of left foot     Decreased stamina     Balance problem      Physician: Issa Merida DPM    Physician Orders: PT Eval and Treat   Medical Diagnosis from Referral: M72.2 (ICD-10-CM) - Plantar fasciitis of left foot R53.83 (ICD-10-CM) - Decreased stamina R26.89 (ICD-10-CM) - Balance problem   Evaluation Date: 2/27/2023  Authorization Period Expiration: 04/24/23  Plan of Care Expiration: 04/24/23  Progress Note Due: 03/27/23  Visit # / Visits authorized: 1/ 1   FOTO: 1/3    Precautions: Standard     Time In: 1132  Time Out: 1215  Total Appointment Time (timed & untimed codes): 43 minutes      SUBJECTIVE   Date of onset: 1 yr ago pain in left ( knee surgery June 2022) achilles partial tear fall 2021    History of current condition - GALLITO reports: was getting therapy for achilles in 2021 that he had to stop due to left shoulder surgery. Started to have heel pain sometime after. Started to wear newer supportive shoes following follow up with podiatrist. Rates walking tolerance ~10 minutes, standing tolerance 1 hr. Planned trip to [place] in May of this where he hopes to walk without pain Rates balance as "tricky" able to ascend/descend stairs with handrail. Incline to driveway is hard. Has SPC but stopped using.      Falls: - in past 3 months.     Imaging, bone scan films: patellar enthesophyte.     Impression:     Postsurgical changes of total left knee arthroplasty without evidence of hardware complication.     Bicompartmental osteoarthrosis of the right knee worse in the medial and patellofemoral compartments.        Electronically signed by: Amy Pratt  Date:                                            01/04/2023    Prior Therapy: yes 2021 achilles   Social History:  lives with a friend/partner " "  Occupation: retired   Prior Level of Function: 2021 community distance  Current Level of Function: short to mod distance     Pain:  Current 3/10, worst 10/10, best 3/10   Location: left feet/heel medial side        Description: Burning  Aggravating Factors: standing on hard surfaces, sitting for long time, walking   Easing Factors: pain medication    Patients goals: walk with reduce pain and improve his stamina     Medical History:   Past Medical History:   Diagnosis Date    GERD (gastroesophageal reflux disease)     Hyperlipidemia     Hypertension        Surgical History:   Dayton Faye  has a past surgical history that includes Spine surgery; Knee surgery (Left); Hip surgery (Right); Shoulder surgery (Right); Shoulder surgery (Left); Retinal detachment surgery (Left); Joint replacement (2016); and Cataract extraction.    Medications:   Dayton has a current medication list which includes the following prescription(s): atorvastatin, coenzyme q10, furosemide, ginkgo biloba, krill-om-3-dha-epa-phospho-ast, claritin-d 12 hour, losartan, mv-min-folic acid-lutein, pantoprazole, prednisolone acetate, tizanidine, and vit c/e/zn/coppr/lutein/zeaxan.    Allergies:   Review of patient's allergies indicates:   Allergen Reactions    Adhesive Rash     Patients states "surgical tape."    Latex, natural rubber Rash        Objective     Observation:       Posture: fair      Gait: (antalgic) decreased weight on L side during gait     ROM:  Ankle Left Right   Dorsiflexion 15 (20) degrees 11 (12) degrees   Plantarflexion 35 (37) degrees 35 (40) degrees   Inversion 25 (25) degrees 25 (25) degrees   Eversion 18 (20) degrees 35 (35) degrees     MMT:  Right LE  Left LE    Knee extension: 5/5 Knee extension: 5/5   Knee flexion: 5/5 Knee flexion: 5/5   Hip flexion: 4+/5 Hip flexion: 4+/5         Ankle Left Right   Dorsiflexion 5/5 5/5   Plantarflexion 4/5 5/5   Inversion 4+/5 4+/5   Eversion 4/5 4/5     Special Tests:    Ankle Left Right "   Windlass test Positive Negative     Joint Mobility: NT    Palpation: Medial mid heel + increased soft tissue/bone?, pain along proximal planter fascia     Sensation: wnl     Flexibility: restricted plantar fascia         CMS Impairment/Limitation/Restriction for FOTO foot Survey    Therapist reviewed FOTO scores for Dayton Faye on 2/27/2023.   FOTO documents entered into [employer] - see Media section.    Limitation Score: 67%  Category: Mobility         TREATMENT     Total Treatment time separate from Evaluation: 8 minutes    GALLITO received therapeutic exercises to develop strength, ROM, and flexibility for 8 minutes including:  Patient education: Discussed current condition, prognosis, plan of care. Demonstration and practice of Home Exercise Program.   Home Exercises and Patient Education Provided    Education provided:   - Role of PT, PT POC    Written Home Exercises Provided: yes.  Exercises were reviewed and GALLITO was able to demonstrate them prior to the end of the session.  GALLITO demonstrated good  understanding of the education provided.     See EMR under Patient Instructions for exercises provided 2/27/2023.    Assessment   Dayton is a 72 y.o. male referred to outpatient Physical Therapy with a medical diagnosis of M72.2 (ICD-10-CM) - Plantar fasciitis of left foot R53.83 (ICD-10-CM) - Decreased stamina R26.89 (ICD-10-CM) - Balance problem . Physical exam is consistent with referring diagnosis, balance assessment not performed today due to time. Will assess at next visit. Primary impairments include AROM, PROM, joint mobility, strength, balance, soft tissue restrictions, and pain which limits functional mobility. This pt is a good candidate for skilled PT tx and stands to benefit from a combination of manual therapy including joint mobilizations with trigger point/myofacscial release, therapeutic exercise to establish core/joint stability, neuromuscular re-education, dry needling and modalities Prn. The pt has  been educated on their dx/POC and consents to further PT tx.    Pt prognosis is Good.   Pt will benefit from skilled outpatient Physical Therapy to address the deficits stated above and in the chart below, provide pt/family education, and to maximize pt's level of independence.     Plan of care discussed with patient: Yes  Pt's spiritual, cultural and educational needs considered and patient is agreeable to the plan of care and goals as stated below:     Anticipated Barriers for therapy: none    Medical Necessity is demonstrated by the following  History  Co-morbidities and personal factors that may impact the plan of care Co-morbidities:   GERD (gastroesophageal reflux disease)   Hyperlipidemia   Hypertension       Personal Factors:   no deficits     low   Examination  Body Structures and Functions, activity limitations and participation restrictions that may impact the plan of care Body Regions:   lower extremities    Body Systems:    gross symmetry  ROM  strength    Participation Restrictions:   none    Activity limitations:   Learning and applying knowledge  no deficits    General Tasks and Commands  no deficits    Communication  no deficits    Mobility  lifting and carrying objects  walking    Self care  no deficits    Domestic Life  no deficits    Interactions/Relationships  no deficits    Life Areas  no deficits    Community and Social Life  no deficits         low   Clinical Presentation evolving clinical presentation with changing clinical characteristics low   Decision Making/ Complexity Score: low     Goals:  Short-Term Goals: 4 weeks  1) The patient will be independent and compliant with initial home exercise program as prescribed by physical therapist.  2) The patient will report pain at worst in past week as 7/10 or less  3) The patient will increase strength in MMT of the left ankle ankle to 4+/5 in order to demonstrate improvements in functional strength for weight-bearing and gait.      Long-Term  Goals: 8 weeks  1) Pt to achieve <50% limitation as measured by the FOTO to demonstrate decreased disability.  2) The patient will report pain at worst in past week as 7/10 or less  3) The patient will increase strength in MMT of the left ankle to 5/5 in order to demonstrate improvements in functional strength for weight-bearing and gait.  4) patient will ascend/descend stairs with normal and reciprocal gait pattern with 1 handrail or less  5) patient will ambulate with normal and reciprocal gait pattern   6) patient will report no pain with ambulation with shoes on level ground for 15 minutes or more.       Plan   Plan of care Certification: 2/27/2023 to 04/24/23.    Outpatient Physical Therapy 2 times weekly for 8 weeks to include the following interventions: Gait Training, Manual Therapy, Moist Heat/ Ice, Neuromuscular Re-ed, Patient Education, Therapeutic Activities, Therapeutic Exercise, Ultrasound, and modalities PRN .     Bandar Ling, PT      I CERTIFY THE NEED FOR THESE SERVICES FURNISHED UNDER THIS PLAN OF TREATMENT AND WHILE UNDER MY CARE   Physician's comments:     Physician's Signature: ___________________________________________________

## 2023-02-27 NOTE — PATIENT INSTRUCTIONS
Access Code: [email or web address]  URL: [email or web address]  Date: 02/27/2023  Prepared by: Bandar Medina Notes  - left foot resting on knee, pull foot and toes back till you feel a stretch. 3 x 30s   - step on band with bottom of foot, curl toes back towards ground 2 x 10       Exercises  Long Sitting Calf Stretch with Strap - 2 x daily - 7 x weekly - 2 sets - 10 reps  Seated Plantar Fascia Mobilization with Small Ball - 2 x daily - 7 x weekly - 2 sets - 10 reps

## 2023-03-06 ENCOUNTER — CLINICAL SUPPORT (OUTPATIENT)
Dept: REHABILITATION | Facility: HOSPITAL | Age: 73
End: 2023-03-06
Attending: PODIATRIST
Payer: MEDICARE

## 2023-03-06 DIAGNOSIS — M72.2 PLANTAR FASCIITIS OF LEFT FOOT: Primary | ICD-10-CM

## 2023-03-06 PROCEDURE — 97035 APP MDLTY 1+ULTRASOUND EA 15: CPT | Mod: PO

## 2023-03-06 PROCEDURE — 97140 MANUAL THERAPY 1/> REGIONS: CPT | Mod: PO

## 2023-03-06 PROCEDURE — 97110 THERAPEUTIC EXERCISES: CPT | Mod: PO

## 2023-03-06 NOTE — PROGRESS NOTES
[facility]   Physical Therapy Treatment Note     Name: Dayton Faye  Clinic Number: [id number]    Therapy Diagnosis:   Encounter Diagnosis   Name Primary?    Plantar fasciitis of left foot Yes     Physician: Issa Merida DPM    Visit Date: 3/6/2023       Physician Orders: PT Eval and Treat   Medical Diagnosis from Referral: M72.2 (ICD-10-CM) - Plantar fasciitis of left foot R53.83 (ICD-10-CM) - Decreased stamina R26.89 (ICD-10-CM) - Balance problem   Evaluation Date: 2/27/2023  Authorization Period Expiration: 04/24/23  Plan of Care Expiration: 04/24/23  Progress Note Due: 03/27/23  Visit # / Visits authorized: 2/20  FOTO: 1/3     Precautions: Standard     PTA Visit #: 0/5     Time In: 1043  Time Out: 1130  Total Billable Time: 47 minutes      SUBJECTIVE     Pt reports: has been doing stretching HEP religiously except ice water bottle massage. States feels like it's getting better.   He was compliant with home exercise program.  Response to previous treatment: fine   Functional change: too soon tell     Pain: 0/10  Location: left heel      OBJECTIVE     Objective Measures updated at progress report unless specified.     Treatment     GALLITO received the treatments listed below:      therapeutic exercises to develop strength, endurance, and ROM for 15  minutes including:  Recumbent bike L4 5 minutes   Gastroc stretch on board 3 x 30s   Foot intrinsics 2 x 20 with Green TB.   Plantar fascia stretch 3 x 30s    manual therapy techniques: Soft tissue Mobilization were applied to the: Left foot for 17 minutes, including:  STM to left plantar fascia  Manual PF stretch and gastroc stretch 2 x30 s   Posterior glide to talus Gr 3-4    direct contact modalities after being cleared for contraindications: Ultrasound:  Dayton received ultrasound to manage pain and inflammation at 50 % duty cycle applied to the 1.0 at an intensity of  number entry box W/cm2  for a duration of 15 minutes. Patient  tolerated treatment well without adverse effects. Therapist was in attendance throughout intervention.            Patient Education and Home Exercises     Home Exercises Provided and Patient Education Provided     Education provided:   - Reviewed     Written Home Exercises Provided: Patient instructed to cont prior HEP. Exercises were reviewed and GALLITO was able to demonstrate them prior to the end of the session.  GALLITO demonstrated good  understanding of the education provided. See EMR under Patient Instructions for exercises provided during therapy sessions    ASSESSMENT     Corrected stretching HEP. Patient tolerated manual stretching and STM to left ventral heel well. Patient had reports no pain or discomfort with US to ventral heel.     GALLITO Is progressing well towards his goals.   Pt prognosis is Good.     Pt will continue to benefit from skilled outpatient physical therapy to address the deficits listed in the problem list box on initial evaluation, provide pt/family education and to maximize pt's level of independence in the home and community environment.     Pt's spiritual, cultural and educational needs considered and pt agreeable to plan of care and goals.     Anticipated barriers to physical therapy: none     Goals:  Short-Term Goals: 4 weeks  1) The patient will be independent and compliant with initial home exercise program as prescribed by physical therapist.  2) The patient will report pain at worst in past week as 7/10 or less  3) The patient will increase strength in MMT of the left ankle ankle to 4+/5 in order to demonstrate improvements in functional strength for weight-bearing and gait.        Long-Term Goals: 8 weeks  1) Pt to achieve <50% limitation as measured by the FOTO to demonstrate decreased disability.  2) The patient will report pain at worst in past week as 7/10 or less  3) The patient will increase strength in MMT of the left ankle to 5/5 in order to demonstrate improvements in  functional strength for weight-bearing and gait.  4) patient will ascend/descend stairs with normal and reciprocal gait pattern with 1 handrail or less  5) patient will ambulate with normal and reciprocal gait pattern   6) patient will report no pain with ambulation with shoes on level ground for 15 minutes or more.        PLAN     L foot stregthengin as tolerated, Stretching, MT , US     Bandar Ling, PT

## 2023-03-08 ENCOUNTER — CLINICAL SUPPORT (OUTPATIENT)
Dept: REHABILITATION | Facility: HOSPITAL | Age: 73
End: 2023-03-08
Attending: PODIATRIST
Payer: MEDICARE

## 2023-03-08 DIAGNOSIS — M72.2 PLANTAR FASCIITIS OF LEFT FOOT: Primary | ICD-10-CM

## 2023-03-08 PROCEDURE — 97140 MANUAL THERAPY 1/> REGIONS: CPT | Mod: PO

## 2023-03-08 PROCEDURE — 97035 APP MDLTY 1+ULTRASOUND EA 15: CPT | Mod: PO

## 2023-03-08 PROCEDURE — 97110 THERAPEUTIC EXERCISES: CPT | Mod: PO

## 2023-03-08 NOTE — PROGRESS NOTES
[facility]   Physical Therapy Treatment Note     Name: Dayton Faye  Clinic Number: [id number]    Therapy Diagnosis:   Encounter Diagnosis   Name Primary?    Plantar fasciitis of left foot Yes     Physician: Issa Merida DPM    Visit Date: 3/8/2023       Physician Orders: PT Eval and Treat   Medical Diagnosis from Referral: M72.2 (ICD-10-CM) - Plantar fasciitis of left foot R53.83 (ICD-10-CM) - Decreased stamina R26.89 (ICD-10-CM) - Balance problem   Evaluation Date: 2/27/2023  Authorization Period Expiration: 04/24/23  Plan of Care Expiration: 04/24/23  Progress Note Due: 03/27/23  Visit # / Visits authorized: 3/20  FOTO: 1/3     Precautions: Standard     PTA Visit #: 0/5     Time In: 1000  Time Out: 1045  Total Billable Time: 45 minutes      SUBJECTIVE     Pt reports: states he got more cramps in PM following Monday treatment. Foot/heel felt pretty good, slight burning sensation in Tuesday that didn't last.   He was compliant with home exercise program.  Response to previous treatment: fine   Functional change: too soon tell     Pain: 0/10  Location: left heel      OBJECTIVE     Objective Measures updated at progress report unless specified.     Treatment     Santino received the treatments listed below:      therapeutic exercises to develop strength, endurance, and ROM for 20  minutes including:  Recumbent bike L1 5 minutes   Gastroc stretch on board 3 x 30s   HS stretch 3 x 30s   Foot intrinsics 2 x 20 with Green TB.   Plantar fascia stretch 3 x 30s    manual therapy techniques: Soft tissue Mobilization were applied to the: Left foot for 10 minutes, including:  STM to left plantar fascia  Manual PF stretch and gastroc stretch 2 x30 s   Posterior glide to talus Gr 3-4    direct contact modalities after being cleared for contraindications: Ultrasound:  Dayton received ultrasound to manage pain and inflammation at 50 % duty cycle applied to the 1.0 at an intensity of  number entry box  W/cm2  for a duration of 15 minutes. Patient tolerated treatment well without adverse effects. Therapist was in attendance throughout intervention.            Patient Education and Home Exercises     Home Exercises Provided and Patient Education Provided     Education provided:   - Reviewed     Written Home Exercises Provided: Patient instructed to cont prior HEP. Exercises were reviewed and Santino was able to demonstrate them prior to the end of the session.  Santino demonstrated good  understanding of the education provided. See EMR under Patient Instructions for exercises provided during therapy sessions    ASSESSMENT     Continued with MT and US that patient tolerated well. Patient past two visits pain at start have been 0/10. Discussed with patient if he continues to have low symptoms will consider early discharge.      Santino Is progressing well towards his goals.   Pt prognosis is Good.     Pt will continue to benefit from skilled outpatient physical therapy to address the deficits listed in the problem list box on initial evaluation, provide pt/family education and to maximize pt's level of independence in the home and community environment.     Pt's spiritual, cultural and educational needs considered and pt agreeable to plan of care and goals.     Anticipated barriers to physical therapy: none     Goals:  Short-Term Goals: 4 weeks  1) The patient will be independent and compliant with initial home exercise program as prescribed by physical therapist.  2) The patient will report pain at worst in past week as 7/10 or less  3) The patient will increase strength in MMT of the left ankle ankle to 4+/5 in order to demonstrate improvements in functional strength for weight-bearing and gait.        Long-Term Goals: 8 weeks  1) Pt to achieve <50% limitation as measured by the FOTO to demonstrate decreased disability.  2) The patient will report pain at worst in past week as 7/10 or less  3) The patient will increase  strength in MMT of the left ankle to 5/5 in order to demonstrate improvements in functional strength for weight-bearing and gait.  4) patient will ascend/descend stairs with normal and reciprocal gait pattern with 1 handrail or less  5) patient will ambulate with normal and reciprocal gait pattern   6) patient will report no pain with ambulation with shoes on level ground for 15 minutes or more.        PLAN     L foot strengthening as tolerated, Stretching, MT , US     Bandar Ling, PT

## 2023-03-14 ENCOUNTER — CLINICAL SUPPORT (OUTPATIENT)
Dept: REHABILITATION | Facility: HOSPITAL | Age: 73
End: 2023-03-14
Attending: PODIATRIST
Payer: MEDICARE

## 2023-03-14 DIAGNOSIS — M72.2 PLANTAR FASCIITIS OF LEFT FOOT: Primary | ICD-10-CM

## 2023-03-14 PROCEDURE — 97140 MANUAL THERAPY 1/> REGIONS: CPT | Mod: PO,CQ

## 2023-03-14 PROCEDURE — 97110 THERAPEUTIC EXERCISES: CPT | Mod: PO,CQ

## 2023-03-14 PROCEDURE — 97112 NEUROMUSCULAR REEDUCATION: CPT | Mod: PO,CQ

## 2023-03-14 NOTE — PROGRESS NOTES
[facility]   Physical Therapy Treatment Note     Name: Dayton Faye  Clinic Number: [id number]    Therapy Diagnosis:   Encounter Diagnosis   Name Primary?    Plantar fasciitis of left foot Yes     Physician: Issa Merida DPM    Visit Date: 3/14/2023       Physician Orders: PT Eval and Treat   Medical Diagnosis from Referral: M72.2 (ICD-10-CM) - Plantar fasciitis of left foot R53.83 (ICD-10-CM) - Decreased stamina R26.89 (ICD-10-CM) - Balance problem   Evaluation Date: 2/27/2023  Authorization Period Expiration: 04/24/23  Plan of Care Expiration: 04/24/23  Progress Note Due: 03/27/23  Visit # / Visits authorized: 3/20  FOTO: 1/3     Precautions: Standard     PTA Visit #: 1/5     Time In: 1030 AM  Time Out: 1118 AM  Total Billable Time: 48 minutes    SUBJECTIVE     Pt reports: his (L) foot is doing well. Patient states his first few steps in the morning aren't as painful but he feels he does have a balance issue. He was compliant with home exercise program.  Response to previous treatment: fine   Functional change: too soon tell     Pain: 0/10  Location: left heel      OBJECTIVE     Objective Measures updated at progress report unless specified.     Treatment     Santino received the treatments listed below:      therapeutic exercises to develop strength, endurance, and ROM for 20 minutes including:  Recumbent bike L1 5 minutes   Gastroc stretch on board 3 x 30s   HS stretch (standing at stairs) 3 x 30s   Foot intrinsics 2 x 20 with Green TB.   Plantar fascia stretch 3 x 30s  Heel raises 2x5    manual therapy techniques: Soft tissue Mobilization were applied to the: Left foot for 10 minutes, including:  STM to left plantar fascia  Manual PF stretch and gastroc stretch 2 x30 s   Posterior glide to talus Gr 3-4    direct contact modalities after being cleared for contraindications: Ultrasound:  Dayton received ultrasound to manage pain and inflammation at 50 % duty cycle applied to the 1.0 at  an intensity of  number entry box W/cm2 for a duration of 00 minutes. Patient tolerated treatment well without adverse effects. Therapist was in attendance throughout intervention.    Neuromuscular re-education to improve balance, coordination, and proprioception for 10 minutes:  Ankle circles: CW and CCW x 15 ea direction   Narrow base on ground x 30 se x 2  Narrow base on air ex pad x 30 sec x 3  Foot taps on 4 inch step x 30 sec x 3 (HHA)   Stair training x 5 minutes    Patient Education and Home Exercises     Home Exercises Provided and Patient Education Provided     Education provided:   - Reviewed     Written Home Exercises Provided: Patient instructed to cont prior HEP. Exercises were reviewed and Santino was able to demonstrate them prior to the end of the session.  Santino demonstrated good  understanding of the education provided. See EMR under Patient Instructions for exercises provided during therapy sessions    ASSESSMENT     Santino able to progress balance focused activities without complaints of (L) foot pain. Patient challenged by these exercises especially step taps requiring UE assist. No complaints of pain with manual therapy. (L) heel raise difficult for patient due to weakness but this may be related to previous achilles injury.    Santino Is progressing well towards his goals.   Pt prognosis is Good.     Pt will continue to benefit from skilled outpatient physical therapy to address the deficits listed in the problem list box on initial evaluation, provide pt/family education and to maximize pt's level of independence in the home and community environment.     Pt's spiritual, cultural and educational needs considered and pt agreeable to plan of care and goals.     Anticipated barriers to physical therapy: none     Goals:  Short-Term Goals: 4 weeks  1) The patient will be independent and compliant with initial home exercise program as prescribed by physical therapist.  2) The patient will report  pain at worst in past week as 7/10 or less  3) The patient will increase strength in MMT of the left ankle ankle to 4+/5 in order to demonstrate improvements in functional strength for weight-bearing and gait.        Long-Term Goals: 8 weeks  1) Pt to achieve <50% limitation as measured by the FOTO to demonstrate decreased disability.  2) The patient will report pain at worst in past week as 7/10 or less  3) The patient will increase strength in MMT of the left ankle to 5/5 in order to demonstrate improvements in functional strength for weight-bearing and gait.  4) patient will ascend/descend stairs with normal and reciprocal gait pattern with 1 handrail or less  5) patient will ambulate with normal and reciprocal gait pattern   6) patient will report no pain with ambulation with shoes on level ground for 15 minutes or more.        PLAN     L foot strengthening as tolerated, Stretching, MT , US     Rachel Oden, PTA

## 2023-03-17 ENCOUNTER — CLINICAL SUPPORT (OUTPATIENT)
Dept: REHABILITATION | Facility: HOSPITAL | Age: 73
End: 2023-03-17
Attending: PODIATRIST
Payer: MEDICARE

## 2023-03-17 DIAGNOSIS — M72.2 PLANTAR FASCIITIS OF LEFT FOOT: Primary | ICD-10-CM

## 2023-03-17 PROCEDURE — 97112 NEUROMUSCULAR REEDUCATION: CPT | Mod: PO

## 2023-03-17 PROCEDURE — 97110 THERAPEUTIC EXERCISES: CPT | Mod: PO

## 2023-03-17 PROCEDURE — 97140 MANUAL THERAPY 1/> REGIONS: CPT | Mod: PO

## 2023-03-17 NOTE — PROGRESS NOTES
[facility]   Physical Therapy Treatment Note     Name: Dayton Faye  Clinic Number: [id number]    Therapy Diagnosis:   Encounter Diagnosis   Name Primary?    Plantar fasciitis of left foot Yes     Physician: Issa Merida DPM    Visit Date: 3/17/2023       Physician Orders: PT Eval and Treat   Medical Diagnosis from Referral: M72.2 (ICD-10-CM) - Plantar fasciitis of left foot R53.83 (ICD-10-CM) - Decreased stamina R26.89 (ICD-10-CM) - Balance problem   Evaluation Date: 2/27/2023  Authorization Period Expiration: 04/24/23  Plan of Care Expiration: 04/24/23  Progress Note Due: 03/27/23  Visit # / Visits authorized: 3/20  FOTO: 1/3     Precautions: Standard     PTA Visit #: 1/5     Time In: 1025  Time Out: 1110  Total Billable Time: 45 minutes    SUBJECTIVE     Pt reports: had a little burning in heel in PM. Was working in garage with concrete martha.  has been using frozen water bottle for massage. Balance still needs work, stairs are difficulty due to vision.    He was compliant with home exercise program.  Response to previous treatment: fine   Functional change: too soon tell     Pain: 0/10  Location: left heel      OBJECTIVE     Objective Measures updated at progress report unless specified.     Treatment     Santino received the treatments listed below:      therapeutic exercises to develop strength, endurance, and ROM for 13 minutes including:  Recumbent bike L1 5 minutes   Gastroc stretch on board 3 x 30s   HS stretch (standing at stairs) 3 x 30s   Foot intrinsics 2 x 20 with Green TB. B  Heel raises x 15  Plantar fascia stretch 3 x 30s      manual therapy techniques: Soft tissue Mobilization were applied to the: Left foot for 17minutes, including:  STM to left plantar fascia  Manual PF stretch and gastroc stretch 2 x30 s   Posterior glide to talus Gr 3-4    direct contact modalities after being cleared for contraindications: Ultrasound:  Dayton received ultrasound to  manage pain and inflammation at 50 % duty cycle applied to the 1.0 at an intensity of  number entry box W/cm2 for a duration of 00 minutes. Patient tolerated treatment well without adverse effects. Therapist was in attendance throughout intervention.    Neuromuscular re-education to improve balance, coordination, and proprioception for 15 minutes:  Single leg stance B   Tandem stance on airex B   FT eyes closed  on airex finger tip support    NOT TODAY:   Ankle circles: CW and CCW x 15 ea direction   Narrow base on ground x 30 se x 2  Narrow base on air ex pad x 30 sec x 3  Foot taps on 4 inch step x 30 sec x 3 (HHA)   Stair training x 5 minutes    Patient Education and Home Exercises     Home Exercises Provided and Patient Education Provided     Education provided:   - Reviewed     Written Home Exercises Provided: Patient instructed to cont prior HEP. Exercises were reviewed and Santino was able to demonstrate them prior to the end of the session.  Santino demonstrated good  understanding of the education provided. See EMR under Patient Instructions for exercises provided during therapy sessions    ASSESSMENT   Heel raises slightly improved with increased repetions tolerated. Static balance was difficult but progressed with practiced. Able to complete 1 SLS rep with 10s + without UE assistance. No pain in heel, decreased fluid/soft tissue build up in L ventral foot. Discussed if patient pain remains low next week, will D/C at end of week.     Santino Is progressing well towards his goals.   Pt prognosis is Good.     Pt will continue to benefit from skilled outpatient physical therapy to address the deficits listed in the problem list box on initial evaluation, provide pt/family education and to maximize pt's level of independence in the home and community environment.     Pt's spiritual, cultural and educational needs considered and pt agreeable to plan of care and goals.     Anticipated barriers to physical therapy:  none     Goals:  Short-Term Goals: 4 weeks  1) The patient will be independent and compliant with initial home exercise program as prescribed by physical therapist.  2) The patient will report pain at worst in past week as 7/10 or less  3) The patient will increase strength in MMT of the left ankle ankle to 4+/5 in order to demonstrate improvements in functional strength for weight-bearing and gait.        Long-Term Goals: 8 weeks  1) Pt to achieve <50% limitation as measured by the FOTO to demonstrate decreased disability.  2) The patient will report pain at worst in past week as 7/10 or less  3) The patient will increase strength in MMT of the left ankle to 5/5 in order to demonstrate improvements in functional strength for weight-bearing and gait.  4) patient will ascend/descend stairs with normal and reciprocal gait pattern with 1 handrail or less  5) patient will ambulate with normal and reciprocal gait pattern   6) patient will report no pain with ambulation with shoes on level ground for 15 minutes or more.        PLAN     L foot strengthening as tolerated, Stretching, MT , US     Bandar Ling, PT

## 2023-03-21 ENCOUNTER — CLINICAL SUPPORT (OUTPATIENT)
Dept: REHABILITATION | Facility: HOSPITAL | Age: 73
End: 2023-03-21
Attending: PODIATRIST
Payer: MEDICARE

## 2023-03-21 DIAGNOSIS — M72.2 PLANTAR FASCIITIS OF LEFT FOOT: Primary | ICD-10-CM

## 2023-03-21 PROCEDURE — 97110 THERAPEUTIC EXERCISES: CPT | Mod: PO

## 2023-03-21 PROCEDURE — 97112 NEUROMUSCULAR REEDUCATION: CPT | Mod: PO

## 2023-03-21 PROCEDURE — 97140 MANUAL THERAPY 1/> REGIONS: CPT | Mod: PO

## 2023-03-21 NOTE — PROGRESS NOTES
[facility]   Physical Therapy Treatment Note     Name: Dayton Faye  Clinic Number: [id number]    Therapy Diagnosis:   Encounter Diagnosis   Name Primary?    Plantar fasciitis of left foot Yes     Physician: Issa Merida DPM    Visit Date: 3/21/2023       Physician Orders: PT Eval and Treat   Medical Diagnosis from Referral: M72.2 (ICD-10-CM) - Plantar fasciitis of left foot R53.83 (ICD-10-CM) - Decreased stamina R26.89 (ICD-10-CM) - Balance problem   Evaluation Date: 2/27/2023  Authorization Period Expiration: 04/24/23  Plan of Care Expiration: 04/24/23  Progress Note Due: 03/27/23  Visit # / Visits authorized: 3/20  FOTO: 1/3     Precautions: Standard     PTA Visit #: 1/5     Time In: 1025  Time Out: 1110  Total Billable Time: 45 minutes    SUBJECTIVE     Pt reports:nothing new today, hasn't tested to see how far he can walk but was standing more over the weekend with outside house project.      He was compliant with home exercise program.  Response to previous treatment: fine   Functional change: too soon tell     Pain: 0/10  Location: left heel      OBJECTIVE     Objective Measures updated at progress report unless specified.     Treatment     Santino received the treatments listed below:      therapeutic exercises to develop strength, endurance, and ROM for 15 minutes including:  TM no hands .7 speed 5 min   Gastroc stretch on board 3 x 30s   HS stretch (standing at stairs) 3 x 30s   Step up/down steps with alternating LE, lateral step ups B x 30s.   Foot intrinsics 2 x 20 with Green TB. B  Heel raises x 15  Plantar fascia stretch 3 x 30s      manual therapy techniques: Soft tissue Mobilization were applied to the: Left foot for 17 minutes, including:  STM to left plantar fascia  Manual PF stretch and gastroc stretch 2 x30 s   Posterior glide to talus Gr 3-4    direct contact modalities after being cleared for contraindications: Ultrasound:  Dayton received ultrasound to manage pain  and inflammation at 50 % duty cycle applied to the 1.0 at an intensity of  number entry box W/cm2 for a duration of 00 minutes. Patient tolerated treatment well without adverse effects. Therapist was in attendance throughout intervention.    Neuromuscular re-education to improve balance, coordination, and proprioception for 13 minutes:  Single leg stance B   Tandem stance on airex B   FT eyes closed  on airex finger tip support  Added as HEP verbally.     NOT TODAY:   Ankle circles: CW and CCW x 15 ea direction   Narrow base on ground x 30 se x 2  Narrow base on air ex pad x 30 sec x 3  Foot taps on 4 inch step x 30 sec x 3 (HHA)   Stair training x 5 minutes    Patient Education and Home Exercises     Home Exercises Provided and Patient Education Provided     Education provided:   - Reviewed     Written Home Exercises Provided: Patient instructed to cont prior HEP. Exercises were reviewed and Santino was able to demonstrate them prior to the end of the session.  Santino demonstrated good  understanding of the education provided. See EMR under Patient Instructions for exercises provided during therapy sessions    ASSESSMENT   1 instance of heel twinge not quite pain with PF stretch. Tissue induration along ventral foot minimal today. If patient pain remains 0/10 at next visit discussed D/C or canceling remainder of visits and being on hold with visits as needed.     Santino Is progressing well towards his goals.   Pt prognosis is Good.     Pt will continue to benefit from skilled outpatient physical therapy to address the deficits listed in the problem list box on initial evaluation, provide pt/family education and to maximize pt's level of independence in the home and community environment.     Pt's spiritual, cultural and educational needs considered and pt agreeable to plan of care and goals.     Anticipated barriers to physical therapy: none     Goals:  Short-Term Goals: 4 weeks  1) The patient will be independent  and compliant with initial home exercise program as prescribed by physical therapist.  2) The patient will report pain at worst in past week as 7/10 or less  3) The patient will increase strength in MMT of the left ankle ankle to 4+/5 in order to demonstrate improvements in functional strength for weight-bearing and gait.        Long-Term Goals: 8 weeks  1) Pt to achieve <50% limitation as measured by the FOTO to demonstrate decreased disability.  2) The patient will report pain at worst in past week as 7/10 or less  3) The patient will increase strength in MMT of the left ankle to 5/5 in order to demonstrate improvements in functional strength for weight-bearing and gait.  4) patient will ascend/descend stairs with normal and reciprocal gait pattern with 1 handrail or less  5) patient will ambulate with normal and reciprocal gait pattern   6) patient will report no pain with ambulation with shoes on level ground for 15 minutes or more.        PLAN     L foot strengthening as tolerated, Stretching, MT , US     Bandar Ling, PT

## 2023-03-23 ENCOUNTER — DOCUMENTATION ONLY (OUTPATIENT)
Dept: REHABILITATION | Facility: HOSPITAL | Age: 73
End: 2023-03-23

## 2023-03-23 ENCOUNTER — CLINICAL SUPPORT (OUTPATIENT)
Dept: REHABILITATION | Facility: HOSPITAL | Age: 73
End: 2023-03-23
Attending: PODIATRIST
Payer: MEDICARE

## 2023-03-23 DIAGNOSIS — M72.2 PLANTAR FASCIITIS OF LEFT FOOT: Primary | ICD-10-CM

## 2023-03-23 PROCEDURE — 97112 NEUROMUSCULAR REEDUCATION: CPT | Mod: PO,CQ

## 2023-03-23 PROCEDURE — 97140 MANUAL THERAPY 1/> REGIONS: CPT | Mod: PO,CQ

## 2023-03-23 PROCEDURE — 97110 THERAPEUTIC EXERCISES: CPT | Mod: PO,CQ

## 2023-03-23 NOTE — PROGRESS NOTES
[facility]   Physical Therapy Treatment Note     Name: Dayton Faye  [facility] Number: [id number]    Therapy Diagnosis:   Encounter Diagnosis   Name Primary?    Plantar fasciitis of left foot Yes     Physician: Issa Merida DPM    Visit Date: 3/23/2023       Physician Orders: PT Eval and Treat   Medical Diagnosis from Referral: M72.2 (ICD-10-CM) - Plantar fasciitis of left foot R53.83 (ICD-10-CM) - Decreased stamina R26.89 (ICD-10-CM) - Balance problem   Evaluation Date: 2/27/2023  Authorization Period Expiration: 04/24/23  Plan of Care Expiration: 04/24/23  Progress Note Due: 03/27/23  Visit # / Visits authorized: 6/20  FOTO: 1/3     Precautions: Standard     PTA Visit #: 1/5     Time In: 0955 AM  Time Out: 1050 AM  Total Billable Time: 55 minutes    SUBJECTIVE     Pt reports: he feels he is ready for discharge. Patient states his foot is currently pain free. Patient states he is comfortable performing balance activities at home. He was compliant with home exercise program.  Response to previous treatment: fine   Functional change: too soon tell     Pain: 0/10  Location: left heel      OBJECTIVE     Objective Measures updated at progress report unless specified.     Treatment     Santino received the treatments listed below:      therapeutic exercises to develop strength, endurance, and ROM for 30 minutes including:  TM no hands .7 speed 5 min   Gastroc stretch on board 3 x 30s   HS stretch (standing at stairs) 3 x 30s   Step up/down steps with alternating LE, lateral step ups B x 30s.   Foot intrinsics 2 x 20 with Green TB  Heel raises x 15  Plantar fascia stretch 3 x 30s  Review of HEP and how to safely perform balance exercises at home     manual therapy techniques: Soft tissue Mobilization were applied to the: Left foot for 15 minutes, including:  STM to left plantar fascia  Manual PF stretch and gastroc stretch 2 x30 s   Posterior glide to talus Gr 3-4    Neuromuscular re-education  to improve balance, coordination, and proprioception for 13 minutes:  Single leg stance B x 15 sec x 2  Tandem stance on airex B 20 sec x 2   FT eyes closed  on airex finger tip support x 30 sec x 3  Foot taps on 6 inch step x 30 sec x 2   Stair training x 3 minutes    Patient Education and Home Exercises     Home Exercises Provided and Patient Education Provided     Education provided:   - Reviewed     Written Home Exercises Provided: Patient instructed to cont prior HEP. Exercises were reviewed and Santino was able to demonstrate them prior to the end of the session.  Santino demonstrated good  understanding of the education provided. See EMR under Patient Instructions for exercises provided during therapy sessions    ASSESSMENT   Santino presented to clinic without complains of (L) foot pain and with desires to discharge from skilled services. Patient demonstrating difficulty with balance tasks and this is most notable with single limb stance. Patient confident in current HEP and he plans to remain compliant with a personal goal to walk long distances on upcoming trip to [place].     Santino Is progressing well towards his goals.   Pt prognosis is Good.     Pt will continue to benefit from skilled outpatient physical therapy to address the deficits listed in the problem list box on initial evaluation, provide pt/family education and to maximize pt's level of independence in the home and community environment.     Pt's spiritual, cultural and educational needs considered and pt agreeable to plan of care and goals.     Anticipated barriers to physical therapy: none     Goals:  Short-Term Goals: 4 weeks  1) The patient will be independent and compliant with initial home exercise program as prescribed by physical therapist.  2) The patient will report pain at worst in past week as 7/10 or less  3) The patient will increase strength in MMT of the left ankle ankle to 4+/5 in order to demonstrate improvements in functional  strength for weight-bearing and gait.        Long-Term Goals: 8 weeks  1) Pt to achieve <50% limitation as measured by the FOTO to demonstrate decreased disability.  2) The patient will report pain at worst in past week as 7/10 or less  3) The patient will increase strength in MMT of the left ankle to 5/5 in order to demonstrate improvements in functional strength for weight-bearing and gait.  4) patient will ascend/descend stairs with normal and reciprocal gait pattern with 1 handrail or less  5) patient will ambulate with normal and reciprocal gait pattern   6) patient will report no pain with ambulation with shoes on level ground for 15 minutes or more.        PLAN     L foot strengthening as tolerated, Stretching, MT , US     Rachel Oden, PTA

## 2023-03-23 NOTE — PROGRESS NOTES
[facility]  Physical Therapy Discharge Note    Name: Dayton Faye  [facility] Number: [id number]     Therapy Diagnosis:        Encounter Diagnosis   Name Primary?    Plantar fasciitis of left foot Yes      Physician: Issa Merida DPM     Visit Date: 3/23/2023        Physician Orders: PT Eval and Treat   Medical Diagnosis from Referral: M72.2 (ICD-10-CM) - Plantar fasciitis of left foot R53.83 (ICD-10-CM) - Decreased stamina R26.89 (ICD-10-CM) - Balance problem   Evaluation Date: 2/27/2023  Authorization Period Expiration: 04/24/23  Plan of Care Expiration: 04/24/23  Progress Note Due: 03/27/23  Visit # / Visits authorized: 6/20  FOTO: 1/3         Date of Last visit: 03/23/23  Total Visits Received: 7    ASSESSMENT      Patient in aggreement with Physical therapist plan of discharging today. Patient reports feeling ready. Patient is D/C today.     Discharge reason: Patient is now asymptomatic    Discharge FOTO Score: 57%      PLAN   This patient is discharged from physical therapy

## 2023-04-10 ENCOUNTER — PATIENT MESSAGE (OUTPATIENT)
Dept: ORTHOPEDICS | Facility: CLINIC | Age: 73
End: 2023-04-10
Payer: MEDICARE

## 2023-04-11 ENCOUNTER — OFFICE VISIT (OUTPATIENT)
Dept: OPTOMETRY | Facility: CLINIC | Age: 73
End: 2023-04-11
Payer: MEDICARE

## 2023-04-11 DIAGNOSIS — H52.4 HYPEROPIA WITH ASTIGMATISM AND PRESBYOPIA, BILATERAL: Primary | ICD-10-CM

## 2023-04-11 DIAGNOSIS — H52.03 HYPEROPIA WITH ASTIGMATISM AND PRESBYOPIA, BILATERAL: Primary | ICD-10-CM

## 2023-04-11 DIAGNOSIS — Z86.69 HISTORY OF RETINAL DETACHMENT: ICD-10-CM

## 2023-04-11 DIAGNOSIS — H52.203 HYPEROPIA WITH ASTIGMATISM AND PRESBYOPIA, BILATERAL: Primary | ICD-10-CM

## 2023-04-11 PROCEDURE — 99999 PR PBB SHADOW E&M-EST. PATIENT-LVL III: ICD-10-PCS | Mod: PBBFAC,,,

## 2023-04-11 PROCEDURE — 92015 PR REFRACTION: ICD-10-PCS | Mod: ,,,

## 2023-04-11 PROCEDURE — 92015 DETERMINE REFRACTIVE STATE: CPT | Mod: ,,,

## 2023-04-11 PROCEDURE — 99499 UNLISTED E&M SERVICE: CPT | Mod: S$PBB,,,

## 2023-04-11 PROCEDURE — 99213 OFFICE O/P EST LOW 20 MIN: CPT | Mod: PBBFAC,PO

## 2023-04-11 PROCEDURE — 99499 NO LOS: ICD-10-PCS | Mod: S$PBB,,,

## 2023-04-11 PROCEDURE — 99999 PR PBB SHADOW E&M-EST. PATIENT-LVL III: CPT | Mod: PBBFAC,,,

## 2023-04-11 RX ORDER — LORATADINE PSEUDOEPHEDRINE SULFATE 10; 240 MG/1; MG/1
1 TABLET, EXTENDED RELEASE ORAL
COMMUNITY
Start: 2022-11-02

## 2023-04-11 NOTE — PROGRESS NOTES
HPI     Concerns About Ocular Health     Additional comments: Ocular health exam           Comments    DLE: 1/11 by Dr Mcintyre - [facility], pt followed q6months    Pt states h/o cataract sx OU with RD OS with 5 surgeries. Poor va OS but   va OD doing well. + floaters and flashes OS with 1 lg floater OD.     Gtts: PF QAM OS, refresh gtts and gel QHS OU          Last edited by Ally Broussard, OD on 4/11/2023  3:45 PM.            Assessment /Plan     For exam results, see Encounter Report.    Hyperopia with astigmatism and presbyopia, bilateral    History of retinal detachment      1-2. Refraction only visit today as pt is followed every 6 months by Dr. Mcintyre in [place]. Pt has history of retinal detachment OS following cataract surgery and has had 5 surgeries to repair it (in [place], [place]). Pt ok with following optom at [facility] for refractions/any other issues that arise and Dr. Mcintyre for DFEs. Released final spec rx to pt, pt wanting to go back to wearing PAL (previously wore but is currently in bifocal). Ed on minimal change in rx and adaptation to specs. Ed pt to call or message if any issues noted.    RTC: as needed for refraction needs.

## 2023-05-23 ENCOUNTER — OFFICE VISIT (OUTPATIENT)
Dept: PODIATRY | Facility: CLINIC | Age: 73
End: 2023-05-23
Payer: MEDICARE

## 2023-05-23 DIAGNOSIS — M72.2 PLANTAR FASCIITIS OF LEFT FOOT: Primary | ICD-10-CM

## 2023-05-23 DIAGNOSIS — R26.89 BALANCE PROBLEM: ICD-10-CM

## 2023-05-23 PROCEDURE — 99999 PR PBB SHADOW E&M-EST. PATIENT-LVL III: CPT | Mod: PBBFAC,,, | Performed by: PODIATRIST

## 2023-05-23 PROCEDURE — 99212 PR OFFICE/OUTPT VISIT, EST, LEVL II, 10-19 MIN: ICD-10-PCS | Mod: S$PBB,,, | Performed by: PODIATRIST

## 2023-05-23 PROCEDURE — 99999 PR PBB SHADOW E&M-EST. PATIENT-LVL III: ICD-10-PCS | Mod: PBBFAC,,, | Performed by: PODIATRIST

## 2023-05-23 PROCEDURE — 99213 OFFICE O/P EST LOW 20 MIN: CPT | Mod: PBBFAC,PN | Performed by: PODIATRIST

## 2023-05-23 PROCEDURE — 99212 OFFICE O/P EST SF 10 MIN: CPT | Mod: S$PBB,,, | Performed by: PODIATRIST

## 2023-05-23 NOTE — PROGRESS NOTES
Subjective:      Patient ID: Dayton Faye is a 72 y.o. male.    Chief Complaint: Follow-up    Dayton is a 72 y.o. male who presents to the clinic complaining of heel pain in the left foot, especially with the first step in the morning or after rest. The pain is described as Aching and Sharp. The onset of the pain was gradual and has worsened over the past several months. Dayton rates the pain as 2/10. He denies a history of trauma. Prior treatments include rest and has done PT but pain is not better.    2/20/23: Patient returns has been doing stretching and got new shoes, feels much better but pain is still there and has some weakness and stamina issues with walking and standing    2/20/23: Patient returns for follow up left heel pain also relates to some imbalance at times    5/23/23: Patient returns for follow up left heel pain and imbalance issues, he relates the PT has helped a lot and the pain is minimal in the heel and doing much better with the balance issues overall, also wearing better new balance shoes    Review of Systems   Constitutional: Negative for chills and fever.   Cardiovascular:  Negative for claudication and leg swelling.   Respiratory:  Negative for shortness of breath.    Skin:  Negative for itching, nail changes and rash.   Musculoskeletal:  Negative for muscle cramps, muscle weakness and myalgias.        Heel pain left   Gastrointestinal:  Negative for nausea and vomiting.   Neurological:  Negative for focal weakness, loss of balance, numbness and paresthesias.         Objective:      Physical Exam  Constitutional:       General: He is not in acute distress.     Appearance: He is well-developed. He is not diaphoretic.   Cardiovascular:      Pulses:           Dorsalis pedis pulses are 2+ on the right side and 2+ on the left side.        Posterior tibial pulses are 2+ on the right side and 2+ on the left side.      Comments: < 3 sec capillary refill time to toes 1-5 bilateral. Toes and feet are warm  to touch proximally with normal distal cooling b/l. There is some hair growth on the feet and toes b/l. There is no edema b/l. No spider veins or varicosities present b/l.     Musculoskeletal:      Comments: Pain on palpation plantar medial and central heel left. No pain with ROM or MMT. No pain with medial and lateral compression of heel.     Skin:     General: Skin is warm and dry.      Coloration: Skin is not pale.      Findings: No abrasion, bruising, burn, ecchymosis, erythema, laceration, lesion, petechiae or rash.      Nails: There is no clubbing.      Comments: Skin temperature, texture and turgor within normal limits.   Neurological:      Mental Status: He is alert and oriented to person, place, and time.      Sensory: No sensory deficit.      Motor: No tremor, atrophy or abnormal muscle tone.      Comments: Negative tinel sign bilateral.   Psychiatric:         Behavior: Behavior normal.             Assessment:       Encounter Diagnoses   Name Primary?    Plantar fasciitis of left foot Yes    Balance problem            Plan:       Dayton was seen today for follow-up.    Diagnoses and all orders for this visit:    Plantar fasciitis of left foot    Balance problem        I counseled the patient on his conditions, their implications and medical management.    Patient will continue to stretch the tendo achilles complex three times daily as demonstrated in the office.  Literature was dispensed illustrating proper stretching technique.    The patient is advised to reduce or avoid barefoot or flats with ambulation      Continue at home PT exercises    Return PRN    sIsa Merida DPM

## 2023-06-14 ENCOUNTER — OFFICE VISIT (OUTPATIENT)
Dept: ORTHOPEDICS | Facility: CLINIC | Age: 73
End: 2023-06-14
Payer: MEDICARE

## 2023-06-14 VITALS — HEIGHT: 71 IN | WEIGHT: 205 LBS | RESPIRATION RATE: 18 BRPM | BODY MASS INDEX: 28.7 KG/M2

## 2023-06-14 DIAGNOSIS — M17.10 ARTHRITIS OF KNEE: Primary | ICD-10-CM

## 2023-06-14 DIAGNOSIS — M25.561 CHRONIC PAIN OF RIGHT KNEE: ICD-10-CM

## 2023-06-14 DIAGNOSIS — G89.29 CHRONIC PAIN OF RIGHT KNEE: ICD-10-CM

## 2023-06-14 PROCEDURE — 99213 OFFICE O/P EST LOW 20 MIN: CPT | Mod: PBBFAC,PN | Performed by: ORTHOPAEDIC SURGERY

## 2023-06-14 PROCEDURE — 99214 OFFICE O/P EST MOD 30 MIN: CPT | Mod: 57,S$PBB,, | Performed by: ORTHOPAEDIC SURGERY

## 2023-06-14 PROCEDURE — 99999 PR PBB SHADOW E&M-EST. PATIENT-LVL III: ICD-10-PCS | Mod: PBBFAC,,, | Performed by: ORTHOPAEDIC SURGERY

## 2023-06-14 PROCEDURE — 99999 PR PBB SHADOW E&M-EST. PATIENT-LVL III: CPT | Mod: PBBFAC,,, | Performed by: ORTHOPAEDIC SURGERY

## 2023-06-14 PROCEDURE — 99214 PR OFFICE/OUTPT VISIT, EST, LEVL IV, 30-39 MIN: ICD-10-PCS | Mod: 57,S$PBB,, | Performed by: ORTHOPAEDIC SURGERY

## 2023-06-14 NOTE — PROGRESS NOTES
"Past Medical History:   Diagnosis Date    GERD (gastroesophageal reflux disease)     Hyperlipidemia     Hypertension     Retinal detachment        Past Surgical History:   Procedure Laterality Date    CATARACT EXTRACTION Bilateral     HIP SURGERY Right     JOINT REPLACEMENT  2016    Right hip joint replaced    KNEE SURGERY Left     RETINAL DETACHMENT SURGERY Left     SHOULDER SURGERY Right     SHOULDER SURGERY Left     SPINE SURGERY      lumbar fusion       Current Outpatient Medications   Medication Sig    atorvastatin (LIPITOR) 20 MG tablet Take 1 tablet (20 mg total) by mouth once daily.    CLARITIN-D 24 HOUR  mg per 24 hr tablet Take 1 tablet by mouth.    coenzyme Q10 100 mg capsule Take 200 mg by mouth.    furosemide (LASIX) 40 MG tablet Take 1 tablet (40 mg total) by mouth daily as needed (swelling).    ginkgo biloba 120 mg Tab Take by mouth once daily.    krill-om-3-dha-epa-phospho-ast 149-468-99-64 mg Cap Take by mouth once daily.    losartan (COZAAR) 100 MG tablet Take 1 tablet (100 mg total) by mouth once daily.    mv-min-folic acid-lutein 500-250 mcg Chew Take by mouth.    pantoprazole (PROTONIX) 40 MG tablet Take 1 tablet (40 mg total) by mouth once daily.    prednisoLONE acetate (PRED MILD) 0.12 % ophthalmic suspension Place 1 drop into the left eye every morning.    tiZANidine (ZANAFLEX) 4 MG tablet Take 1 tablet (4 mg total) by mouth nightly as needed (muscle spasms).    vit C/E/Zn/coppr/lutein/zeaxan (PRESERVISION AREDS-2 ORAL) Take by mouth 2 (two) times a day.     No current facility-administered medications for this visit.       Review of patient's allergies indicates:   Allergen Reactions    Adhesive Rash     Patients states "surgical tape."    Latex, natural rubber Rash       Family History   Problem Relation Age of Onset    Macular degeneration Father     Heart disease Father     Arthritis Father          at age 92    Hypertension Father     Vision loss Father     No Known Problems " Sister     No Known Problems Brother     Cancer Paternal Grandmother         Lung    Cancer Paternal Grandfather         Prostate    Glaucoma Neg Hx        Social History     Socioeconomic History    Marital status:    Occupational History    Occupation: School psychologist     Comment: retired   Tobacco Use    Smoking status: Former     Packs/day: 0.50     Years: 5.00     Pack years: 2.50     Types: Cigarettes, Pipe     Quit date: 1991     Years since quittin.4    Smokeless tobacco: Never   Substance and Sexual Activity    Alcohol use: Not Currently     Comment: Very occasionally    Drug use: Never    Sexual activity: Yes     Partners: Female     Birth control/protection: Post-menopausal   Social History Narrative    Lives with female partner.  Moved here from [place]. Two cats. Enjoys ancestry, reading.       Chief Complaint:   No chief complaint on file.      History of present illness:  72-year-old male with a history of osteoarthritis throughout his body.  Patient had a previous left total knee done on 2022 while he lived in [place].  Patient recently moved to [place] in December.  His right knee gives him significant pain.  Pain is a 3/10.  He's had cortisone injections and viscosupplementation in the past.  We gave him a shot back in January.  Lasted a few months but the pain is gotten worse.  He would like to go ahead and just get this replaced.      Answers submitted by the patient for this visit:  Orthopedics Questionnaire (Submitted on 2022)  unexpected weight change: No  appetite change : No  sleep disturbance: No  IMMUNOCOMPROMISED: No  nervous/ anxious: No  dysphoric mood: No  rash: No  visual disturbance: No  eye redness: No  eye pain: No  ear pain: No  tinnitus: Yes  hearing loss: Yes  sinus pressure : No  nosebleeds: No  enviro allergies: Yes  food allergies: No  cough: No  shortness of breath: No  sweating: Yes  frequency: Yes  difficulty urinating: No  hematuria:  No  chest pain: No  palpitations: No  nausea: No  vomiting: No  diarrhea: No  blood in stool: No  constipation: No  headaches: No  dizziness: No  numbness: Yes  seizures: No  joint swelling: No  myalgia: Yes  weakness: Yes  back pain: Yes   (Submitted on 12/29/2022)  Chief Complaint: Leg pain  Pain Chronicity: chronic  History of trauma: No  Onset: more than 1 month ago  Frequency: daily  Progression since onset: waxing and waning  Injury mechanism: pulling  injury location: at home  pain- numeric: 6/10  pain location: right knee, left foot  pain quality: burning  Radiating Pain: No  Aggravating factors: bearing weight, standing, sitting  fever: No  inability to bear weight: Yes  itching: No  joint locking: No  limited range of motion: No  stiffness: No  tingling: Yes  Treatments tried: brace/corset, cold, injection treatment, rest  physical therapy: not tried  Improvement on treatment: moderate      Physical Examination:    Vital Signs:  There were no vitals filed for this visit.    There is no height or weight on file to calculate BMI.    This a well-developed, well nourished patient in no acute distress.  They are alert and oriented and cooperative to examination.  Pt. walks without an antalgic gait.      Examination of the right knee shows no rashes or erythema. There are no masses ecchymosis or effusion. Patient has range of motion from 3-130°. Patient is mildly tender to palpation over lateral joint line and moderately tender to palpation over the medial joint line. Patient has a - Lachman exam, - anterior drawer exam, and - posterior drawer exam. - Jair's exam. Knee is stable to varus and valgus stress. 5 out of 5 motor strength. Palpable distal pulses. Intact light touch sensation. Negative Patellofemoral crepitus    Heart is regular rate without obvious murmurs   Normal respiratory effort without audible wheezing  Abdomen is soft and nontender       X-rays:  X-rays of the right knee are  reviewed which  show severe lateral compartment narrowing of the right knee.  Prior left total knee arthroplasty without obvious complications.     Assessment::  Right valgus knee arthritis    Plan:  I reviewed the findings with him today.  Patient is very familiar with his arthritis.  We talked about treatment options ranging from injections to knee replacement.  He would like to go ahead and schedule knee replacement.  Plan is for right robotic assisted total knee arthroplasty.  Risks, benefits, and alternatives to the procedure were explained to the patient including but not limited to damage to nerves, arteries, blood vessels, bones, tendons, ligaments, stiffness, instability, infection, permanent limb dysfunction, DVT, PE, as well as general anesthetic complications including seizure, stroke, heart attack and even death. The patient understood these risks and wished to proceed and signed the informed consent.       This note was created using PiAuto voice recognition software that occasionally misinterpreted phrases or words.    Consult note is delivered via Epic messaging service.

## 2023-06-28 ENCOUNTER — TELEPHONE (OUTPATIENT)
Dept: FAMILY MEDICINE | Facility: CLINIC | Age: 73
End: 2023-06-28
Payer: MEDICARE

## 2023-06-28 NOTE — TELEPHONE ENCOUNTER
----- Message from Benjamin James sent at 6/28/2023 11:36 AM CDT -----  Contact: pt  Type: Needs Medical Advice  Who Called:  pt  Best Call Back Number: [phone]    Additional Information: Pt is calling he needs to be seen for a pre op before surgery he has to come in on the 28 he is trying to see if his pre op can be fit in there, he also has to reschedule because he will be out of town.Please call back and advise.

## 2023-07-07 ENCOUNTER — TELEPHONE (OUTPATIENT)
Dept: FAMILY MEDICINE | Facility: CLINIC | Age: 73
End: 2023-07-07
Payer: MEDICARE

## 2023-07-07 NOTE — TELEPHONE ENCOUNTER
----- Message from Mary Beth Choe sent at 7/7/2023  9:13 AM CDT -----  Contact: pt  Type: Needs Medical Advice  Who Called:  pt     Best Call Back Number: [phone]    Additional Information: pt would like to schedule PRE OP appt. Please advise.

## 2023-07-07 NOTE — TELEPHONE ENCOUNTER
Spoke to pt. [facility] He stated he will schedule his surgery for Aug.

## 2023-07-12 ENCOUNTER — TELEPHONE (OUTPATIENT)
Dept: ORTHOPEDICS | Facility: CLINIC | Age: 73
End: 2023-07-12
Payer: MEDICARE

## 2023-07-12 NOTE — TELEPHONE ENCOUNTER
----- Message from Beverly Clarke MA sent at 7/12/2023  3:49 PM CDT -----  Contact: pt  Pre op questions   Call back

## 2023-07-13 ENCOUNTER — TELEPHONE (OUTPATIENT)
Dept: ORTHOPEDICS | Facility: CLINIC | Age: 73
End: 2023-07-13
Payer: MEDICARE

## 2023-07-13 NOTE — TELEPHONE ENCOUNTER
No surgery date has been chosen. We have to have clearance prior to picking a day for surgery.  But he would like to have his surgery as soon as possible.  So if he was seen even next week we could pick a date.     Asa

## 2023-07-13 NOTE — TELEPHONE ENCOUNTER
----- Message from Francisco J Vaca MA sent at 7/13/2023 11:20 AM CDT -----  Contact: patient  Patient is ready to schedule his surgery but needs to speak to Asa first.    Call back number is [phone]

## 2023-07-13 NOTE — TELEPHONE ENCOUNTER
Called and spoke to patient.  He is scheduled for 8/4/23 for pre op medical clearance.  Is there anyway to move this appointment to a sooner date?  He is willing to see another provider or midlevel provider for clearance.      Any help with this would be greatly appreciated.      Thank you,     Asa

## 2023-07-18 ENCOUNTER — LAB VISIT (OUTPATIENT)
Dept: LAB | Facility: HOSPITAL | Age: 73
End: 2023-07-18
Attending: STUDENT IN AN ORGANIZED HEALTH CARE EDUCATION/TRAINING PROGRAM
Payer: MEDICARE

## 2023-07-18 ENCOUNTER — OFFICE VISIT (OUTPATIENT)
Dept: FAMILY MEDICINE | Facility: CLINIC | Age: 73
End: 2023-07-18
Payer: MEDICARE

## 2023-07-18 VITALS
HEIGHT: 71 IN | WEIGHT: 215.38 LBS | DIASTOLIC BLOOD PRESSURE: 66 MMHG | HEART RATE: 80 BPM | OXYGEN SATURATION: 95 % | SYSTOLIC BLOOD PRESSURE: 126 MMHG | BODY MASS INDEX: 30.15 KG/M2

## 2023-07-18 DIAGNOSIS — R06.09 DYSPNEA ON EXERTION: ICD-10-CM

## 2023-07-18 DIAGNOSIS — N18.31 STAGE 3A CHRONIC KIDNEY DISEASE: ICD-10-CM

## 2023-07-18 DIAGNOSIS — J32.9 CHRONIC SINUSITIS, UNSPECIFIED LOCATION: ICD-10-CM

## 2023-07-18 DIAGNOSIS — Z01.818 PREOPERATIVE CLEARANCE: Primary | ICD-10-CM

## 2023-07-18 DIAGNOSIS — Z01.818 PREOPERATIVE CLEARANCE: ICD-10-CM

## 2023-07-18 LAB
ANION GAP SERPL CALC-SCNC: 9 MMOL/L (ref 8–16)
BUN SERPL-MCNC: 23 MG/DL (ref 8–23)
CALCIUM SERPL-MCNC: 9.3 MG/DL (ref 8.7–10.5)
CHLORIDE SERPL-SCNC: 110 MMOL/L (ref 95–110)
CO2 SERPL-SCNC: 23 MMOL/L (ref 23–29)
CREAT SERPL-MCNC: 1.9 MG/DL (ref 0.5–1.4)
EST. GFR  (NO RACE VARIABLE): 37 ML/MIN/1.73 M^2
GLUCOSE SERPL-MCNC: 81 MG/DL (ref 70–110)
POTASSIUM SERPL-SCNC: 5 MMOL/L (ref 3.5–5.1)
SODIUM SERPL-SCNC: 142 MMOL/L (ref 136–145)

## 2023-07-18 PROCEDURE — 99999 PR PBB SHADOW E&M-EST. PATIENT-LVL IV: CPT | Mod: PBBFAC,,, | Performed by: STUDENT IN AN ORGANIZED HEALTH CARE EDUCATION/TRAINING PROGRAM

## 2023-07-18 PROCEDURE — 93005 ELECTROCARDIOGRAM TRACING: CPT | Mod: PBBFAC,PO | Performed by: INTERNAL MEDICINE

## 2023-07-18 PROCEDURE — 99214 PR OFFICE/OUTPT VISIT, EST, LEVL IV, 30-39 MIN: ICD-10-PCS | Mod: S$PBB,,, | Performed by: STUDENT IN AN ORGANIZED HEALTH CARE EDUCATION/TRAINING PROGRAM

## 2023-07-18 PROCEDURE — 36415 COLL VENOUS BLD VENIPUNCTURE: CPT | Mod: PO | Performed by: STUDENT IN AN ORGANIZED HEALTH CARE EDUCATION/TRAINING PROGRAM

## 2023-07-18 PROCEDURE — 99999 PR PBB SHADOW E&M-EST. PATIENT-LVL IV: ICD-10-PCS | Mod: PBBFAC,,, | Performed by: STUDENT IN AN ORGANIZED HEALTH CARE EDUCATION/TRAINING PROGRAM

## 2023-07-18 PROCEDURE — 80048 BASIC METABOLIC PNL TOTAL CA: CPT | Performed by: STUDENT IN AN ORGANIZED HEALTH CARE EDUCATION/TRAINING PROGRAM

## 2023-07-18 PROCEDURE — 93010 EKG 12-LEAD: ICD-10-PCS | Mod: S$PBB,,, | Performed by: INTERNAL MEDICINE

## 2023-07-18 PROCEDURE — 99214 OFFICE O/P EST MOD 30 MIN: CPT | Mod: PBBFAC,PO | Performed by: STUDENT IN AN ORGANIZED HEALTH CARE EDUCATION/TRAINING PROGRAM

## 2023-07-18 PROCEDURE — 93010 ELECTROCARDIOGRAM REPORT: CPT | Mod: S$PBB,,, | Performed by: INTERNAL MEDICINE

## 2023-07-18 PROCEDURE — 99214 OFFICE O/P EST MOD 30 MIN: CPT | Mod: S$PBB,,, | Performed by: STUDENT IN AN ORGANIZED HEALTH CARE EDUCATION/TRAINING PROGRAM

## 2023-07-18 RX ORDER — CHOLECALCIFEROL (VITAMIN D3) 125 MCG
CAPSULE ORAL 2 TIMES DAILY PRN
COMMUNITY
End: 2023-08-21

## 2023-07-18 NOTE — PATIENT INSTRUCTIONS
Please call our cardiology department by calling [phone] and asking for cardiology so that you can schedule your stress test.     Stop taking naproxen 5 days prior to surgery. Do note take the losartan the day of surgery. You can continue all other medications.

## 2023-07-18 NOTE — ASSESSMENT & PLAN NOTE
Seemingly stable. EKG maybe shows T wave flattening in lead III but otherwise looks normal. I encouraged patient to get contact cardiology about scheduling stress echocardiogram.

## 2023-07-18 NOTE — ASSESSMENT & PLAN NOTE
I again discussed the risks of chronic pseudoephedrine use, including elevated blood pressure and rebound congestion.

## 2023-07-18 NOTE — ASSESSMENT & PLAN NOTE
Repeat lab work today. I advised the patient to seek other pain control besides naproxen - consider diclofenac gel.

## 2023-07-18 NOTE — ASSESSMENT & PLAN NOTE
RCRI score is 1 based on January's creatinine. Will repeat today. I advised he stop naproxen 5 days prior to surgery and losartan the day of surgery. No other special precautions needed.

## 2023-07-18 NOTE — PROGRESS NOTES
Name: Dayton Faye  MRN: [id number]  : 1950  PCP: Thierno Watson MD    HPI  Patient presents for preoperative clearance. Set to undergo right knee arthroplasty with Dr. Prather. No prior history of heart attack, heart failure, or stroke. Creatinine from January was 1.6. He has been taking naproxen for his knee pain.    He takes a little while to recover from anaesthesia. Otherwise, no prior complications from surgery including clot or infection. I advised he get records of prior surgery so we know what anaesthetics were used.    He had previously complained of dyspnea on exertion. He was unaware that the stress echo we discussed was already ordered.     Review of Systems   Respiratory:  Negative for shortness of breath (has not been exerting himself).    Cardiovascular:  Negative for chest pain and leg swelling.     Patient Active Problem List   Diagnosis    Essential hypertension    Mixed hyperlipidemia    Gastroesophageal reflux disease    Plantar fasciitis of left foot    Chronic pain of right knee    Muscle spasm    Chronic sinusitis    Lower extremity edema    Dyspnea on exertion    Stage 3a chronic kidney disease       Vitals:    23 1401   BP: 126/66   Pulse: 80       Physical Exam  Constitutional:       General: He is not in acute distress.     Appearance: Normal appearance. He is well-developed.   HENT:      Head: Normocephalic and atraumatic.      Right Ear: External ear normal.      Left Ear: External ear normal.   Eyes:      Conjunctiva/sclera: Conjunctivae normal.   Cardiovascular:      Rate and Rhythm: Normal rate and regular rhythm.      Heart sounds: No murmur heard.    No friction rub. No gallop.   Pulmonary:      Effort: Pulmonary effort is normal. No respiratory distress.      Breath sounds: No wheezing, rhonchi or rales.   Abdominal:      General: Abdomen is flat. There is no distension.   Musculoskeletal:         General: No swelling or deformity.      Right lower leg: No edema.       Left lower leg: No edema.   Skin:     General: Skin is warm and dry.      Coloration: Skin is not jaundiced.   Neurological:      Mental Status: He is alert and oriented to person, place, and time. Mental status is at baseline.   Psychiatric:         Attention and Perception: Attention and perception normal.         Mood and Affect: Mood normal.         Speech: Speech normal.         Behavior: Behavior normal. Behavior is cooperative.         Thought Content: Thought content normal.         Cognition and Memory: Cognition normal.         Judgment: Judgment normal.       1. Preoperative clearance  Assessment & Plan:  RCRI score is 1 based on January's creatinine. Will repeat today. I advised he stop naproxen 5 days prior to surgery and losartan the day of surgery. No other special precautions needed.    Orders:  -     IN OFFICE EKG 12-LEAD (to Muse)  -     BASIC METABOLIC PANEL; Future; Expected date: 07/18/2023    2. Stage 3a chronic kidney disease  Assessment & Plan:  Repeat lab work today. I advised the patient to seek other pain control besides naproxen - consider diclofenac gel.    Orders:  -     BASIC METABOLIC PANEL; Future; Expected date: 07/18/2023    3. Dyspnea on exertion  Assessment & Plan:  Seemingly stable. EKG maybe shows T wave flattening in lead III but otherwise looks normal. I encouraged patient to get contact cardiology about scheduling stress echocardiogram.      4. Chronic sinusitis, unspecified location  Assessment & Plan:  I again discussed the risks of chronic pseudoephedrine use, including elevated blood pressure and rebound congestion.          Follow up in 6 months    Thierno Watson MD  07/18/2023

## 2023-07-19 ENCOUNTER — PATIENT MESSAGE (OUTPATIENT)
Dept: ORTHOPEDICS | Facility: CLINIC | Age: 73
End: 2023-07-19
Payer: MEDICARE

## 2023-07-19 ENCOUNTER — TELEPHONE (OUTPATIENT)
Dept: ORTHOPEDICS | Facility: CLINIC | Age: 73
End: 2023-07-19
Payer: MEDICARE

## 2023-07-19 DIAGNOSIS — N18.32 STAGE 3B CHRONIC KIDNEY DISEASE: Primary | ICD-10-CM

## 2023-07-19 DIAGNOSIS — M17.11 PRIMARY OSTEOARTHRITIS OF RIGHT KNEE: Primary | ICD-10-CM

## 2023-07-19 NOTE — TELEPHONE ENCOUNTER
----- Message from Emilee Zapata MA sent at 7/19/2023  9:20 AM CDT -----  Contact: patient    ----- Message -----  From: Francisco J Vaca MA  Sent: 7/19/2023   9:20 AM CDT  To: [facility] Staff    Patient stated he is ready to schedule his right knee surgery.    Call back number is [phone]

## 2023-07-21 DIAGNOSIS — Z01.818 PRE-OP EXAM: ICD-10-CM

## 2023-07-21 DIAGNOSIS — M17.11 PRIMARY OSTEOARTHRITIS OF RIGHT KNEE: Primary | ICD-10-CM

## 2023-07-21 RX ORDER — CLINDAMYCIN PHOSPHATE 900 MG/50ML
900 INJECTION, SOLUTION INTRAVENOUS
Status: CANCELLED | OUTPATIENT
Start: 2023-07-21

## 2023-07-21 RX ORDER — MUPIROCIN 20 MG/G
OINTMENT TOPICAL
Status: CANCELLED | OUTPATIENT
Start: 2023-07-21

## 2023-07-24 ENCOUNTER — TELEPHONE (OUTPATIENT)
Dept: ORTHOPEDICS | Facility: CLINIC | Age: 73
End: 2023-07-24
Payer: MEDICARE

## 2023-07-24 NOTE — TELEPHONE ENCOUNTER
----- Message from Ricci Iglesias sent at 7/24/2023 10:49 AM CDT -----  Regarding: needs post op vidio sent to him, call [phone]  Contact: [phone]  needs post op vidio sent to him, call [phone]

## 2023-07-26 ENCOUNTER — HOSPITAL ENCOUNTER (OUTPATIENT)
Dept: RADIOLOGY | Facility: HOSPITAL | Age: 73
Discharge: HOME | End: 2023-07-26
Attending: ORTHOPAEDIC SURGERY
Payer: MEDICARE

## 2023-07-26 ENCOUNTER — HOSPITAL ENCOUNTER (OUTPATIENT)
Dept: PREADMISSION TESTING | Facility: HOSPITAL | Age: 73
Discharge: HOME | End: 2023-07-26
Attending: ORTHOPAEDIC SURGERY
Payer: MEDICARE

## 2023-07-26 DIAGNOSIS — Z01.818 PREOPERATIVE TESTING: Primary | ICD-10-CM

## 2023-07-26 DIAGNOSIS — Z01.818 PRE-OP EXAM: ICD-10-CM

## 2023-07-26 DIAGNOSIS — M17.11 PRIMARY OSTEOARTHRITIS OF RIGHT KNEE: ICD-10-CM

## 2023-07-26 PROCEDURE — 73700 CT KNEE WITHOUT CONTRAST RIGHT W/MAKO PROTOCOL: ICD-10-PCS | Mod: 26,RT,, | Performed by: RADIOLOGY

## 2023-07-26 PROCEDURE — 73700 CT LOWER EXTREMITY W/O DYE: CPT | Mod: 26,RT,, | Performed by: RADIOLOGY

## 2023-07-26 PROCEDURE — 73700 CT LOWER EXTREMITY W/O DYE: CPT | Mod: TC,RT

## 2023-07-26 RX ORDER — POTASSIUM CHLORIDE 750 MG/1
10 TABLET, EXTENDED RELEASE ORAL
COMMUNITY
End: 2024-02-06

## 2023-07-26 NOTE — DISCHARGE INSTRUCTIONS
To confirm, Your doctor has instructed you that surgery is scheduled for: 8/8/23 with Dr. Scott    Please report to [facility], Registration the morning of surgery. You must check-in and receive a wristband before going to your procedure.  [address] [place] [address] [place] [address]    Pre-Op will call the afternoon prior to surgery between 1:00 and 6:00 PM with the final arrival time.  Phone number: [phone]    PLEASE NOTE:  The surgery schedule has many variables which may affect the time of your surgery case.  Family members should be available if your surgery time changes.  Plan to be here the day of your procedure between 4-6 hours.    MEDICATIONS:  TAKE ONLY THESE MEDICATIONS WITH A SMALL SIP OF WATER THE MORNING OF YOUR PROCEDURE:  ATORVASTATIN, CLARITIN, EYE DROPS, PANTOPRAZOLE    NO LASIX MORNING OF SURGERY BUT YOU DO NOT HAVE TO STOP DAYS BEFORE.    DO NOT TAKE THESE MEDICATIONS 5-7 DAYS PRIOR to your procedure or per your surgeon's request:   ASPIRIN, ALEVE, ADVIL, IBUPROFEN, FISH OIL VITAMIN E, HERBALS, COQ10, GINKO, KRILL OIL, PRESERVISON  (May take Tylenol)    ONLY if you are prescribed any types of blood thinners such as:  Aspirin, Coumadin, Plavix, Pradaxa, Xarelto, Aggrenox, Effient, Eliquis, Savasya, Brilinta, or any other, ask your surgeon whether you should stop taking them and how long before surgery you should stop.  You may also need to verify with the prescribing physician if it is ok to stop your medication.      INSTRUCTIONS IMPORTANT!!  Do not eat or drink anything between midnight and the time of your procedure- this includes gum, mints, and candy.  Do not smoke or drink alcoholic beverages 24 hours prior to your procedure.  Shower the night before AND the morning of your procedure with a Chlorhexidine wash such as Hibiclens or Dial antibacterial soap from the neck down.  Do not get it on your face or in your eyes.  You may use your own shampoo and face wash. This  helps your skin to be as bacteria free as possible.    If you wear contact lenses, dentures, hearing aids or glasses, bring a container to put them in during surgery and give to a family member for safe keeping.  Please leave all jewelry, piercing's and valuables at home. You must remove your false eyelashes prior to surgery.    DO NOT remove hair from the surgery site.  Do not shave the incision site unless you are given specific instructions to do so.    ONLY if you have been diagnosed with sleep apnea please bring your C-PAP machine.  ONLY if you wear home oxygen please bring your portable oxygen tank the day of your procedure.  ONLY if you have a history of OPEN HEART SURGERY you will need a clearance from your Cardiologist per Anesthesia.      ONLY for patients requiring bowel prep, written instructions will be given by your doctor's office.  ONLY if you have a neuro stimulator, please bring the controller with you the morning of surgery  ONLY if a type and screen test is needed before surgery, please return:  If your doctor has scheduled you for an overnight stay, bring a small overnight bag with any personal items you need.  Make arrangements in advance for transportation home by a responsible adult.  It is not safe to drive a vehicle during the 24 hours after anesthesia.          All  facilities and properties are tobacco free.  Smoking is NOT allowed.   If you have any questions about these instructions, call Pre-Op Admit  Nursing at [phone] or the Pre-Op Day Surgery Unit at [phone].

## 2023-07-26 NOTE — PRE ADMISSION SCREENING
JOINT CAMP ASSESSMENT    Name Dayton Faye   MRN [id number]    Age/Sex 72 y.o. male    Surgeon Dr. Fili Scott   Joint Camp Date 7/26/2023   Surgery Date 8/8/2023   Procedure Right Knee Arthroplasty   Insurance Payor: MEDICARE / Plan: MEDICARE PART A & B / Product Type: Government /    Care Team Patient Care Team:  Thierno Watson MD as PCP - General (Internal Medicine)    Pharmacy   [id number] - [facility] [address] [facility]  [address]  [address]  [facility] [address]  Phone: [phone] Fax: [phone]    [facility] #[id number] - [place], [place] - [address]  [address]  [facility] [address]  Phone: [phone] Fax: [phone]     AM-PAC Score   22   Risk Assessment Score 6     Past Medical History:   Diagnosis Date    General anesthetics causing adverse effect in therapeutic use     takes longer to wake up after anesthesia    Hypertension     PONV (postoperative nausea and vomiting)     Pre-diabetes     Retinal detachment        Past Surgical History:   Procedure Laterality Date    BACK SURGERY      CATARACT EXTRACTION Bilateral     HIP SURGERY Right     JOINT REPLACEMENT  2016    Right hip joint replaced    KNEE SURGERY Left     RETINAL DETACHMENT SURGERY Left     SHOULDER SURGERY Right     SHOULDER SURGERY Left     SPINE SURGERY      lumbar fusion         Home Enviroment     Living Arrangement: Lives with spouse  Home Environment: 1-story house/ trailer, walk-in shower  Home Safety Concerns: Pets in the home: cats (2).    DISCHARGE CAREGIVER/SUPPORT SYSTEM     Identified post-op caregiver: Patient has spouse / significant other.  Patient's caregiver(s) will be able to provide physical assistance. Patient will have someone to assist overnight.      Caregiver present at pre-op interview:  No      PRE-OPERATIVE FUNCTIONAL STATUS     Employment: Retired    Pre-op Functional Status: Patient is independent  with mobility/ambulation, transfers, ADL's, IADL's.    Use of assistive device for ambulation: straight cane  ADL: self care  ADL Limitations: difficulty with walking  Medical Restrictions: Unstable ambulation and Decreased range of motions in extremities    POTENTIAL BARRIERS TO DISCHARGE/POTENTIAL POST-OP COMPLICATIONS     Patient with hx of HTN, post operative nausea & vomiting, difficulty waking up after anesthesia. POSSIBLE SAME DAY DISCHARGE.    DISCHARGE PLAN     Expected LOS of 1 days or less for joint replacement discussed with patient. We also discussed a discharge path of HH for approximately two weeks with a transition to outpatient PT on the third week given no post-op complications.      Patient in agreement with discharge plan: Yes    Discharge to: Discharge home with home health (PT/OT) x2 weeks with transition to outpatient PT     HH:  [facility] ([place], [place]). Patient disclosure form completed and sent to case management for upload to the medical record.      OP PT: [facility] ([facility]).     Home DME: rolling walker, single point cane, and bedside commode    Needed DME at D/C: none     Rx: Per Dr. Scott at discharge     Meds to Beds: Yes  Patient expected to discharge on Aspirin 81mg by mouth twice daily for DVT prophylaxis.

## 2023-07-26 NOTE — PRE ADMISSION SCREENING
Patient Name: Dayton Faye  YOB: 1950   MRN: [id number]     [facility]   Basic Mobility Inpatient Short Form 6 Clicks         How much difficulty does the patient currently have  Unable  A Lot  A Little  None      1. Turning over in bed (including adjusting bedclothes, sheets and blankets)?     1 []    2 []    3 [x]    4 []        2. Sitting down on and standing up from a chair with arms (e.g., wheelchair, bedside commode, etc.)     1 []  2 []  3 []     4 [x]      3. Moving from lying on back to sitting on the side of the bed?     1 []  2 []  3 [x]    4 []    How much help from another person does the patient currently need  Total  A Lot  A Little  None      4. Moving to and from a bed to a chair (including a wheelchair)?    1 []  2 []  3 []    4 [x]      5. Need to walk in hospital room?    1 []  2 []  3 []    4 [x]      6. Climbing 3-5 steps with a railing?    1 []  2 []  3 []    4 [x]       Raw Score:      22            CMS 0-100% Score:   20.91         %   Standardized Score:    53.28           CMS Modifier:       CJ                                   [facility]   Basic Mobility Inpatient Short Form 6 Clicks Score Conversion Table*         *Use this form to convert -PAC Basic Mobility Inpatient Raw Scores.   [facility] Inpatient Basic Mobility Short Form Scoring Example   1. Add the number values associated with the response to each item. For example, items totals yield a Raw Score of 21.   2. Match the raw score to the t-Scale scores (t-Scale score = 50.25, SE = 4.69).   3. Find the associated CMS % (CMS % = 28.97%).   4. Locate the correct CMS Functional Modifier Code, or G Code (G code = CJ)     NOTE: Each -PAC Short Form has a separate conversion table. Make sure that you use the correct conversion table.       Instruction Manual - page 45 contains conversion table

## 2023-07-26 NOTE — PRE ADMISSION SCREENING
"               [facility] Risk Assessment Scale    Patient Name: Dayton Faye  YOB: 1950  MRN: [id number]            RIsk Factor Measure Recommendation Patient Data Scale/Score   BMI >40 Reconsider surgery, weight loss   Estimated body mass index is 30.04 kg/m² as calculated from the following:    Height as of 7/18/23: 5' 11" (1.803 m).    Weight as of 7/18/23: 97.7 kg (215 lb 6.2 oz).   [] 0 = 1 - 24.9  [] 1 = 25-29.9  [x] 2 = 30-34.9  [] 3 = 35-39.9  [] 4 = 40-44.9  [] 5 = 45-99.9   Hemoglobin AIC (if applicable) >9 Delay surgery until DM under control  Refer for:  Nutrition Therapy  Exercise   Medication    No results found for: LABA1C, HGBA1C    Lab Results   Component Value Date    GLU 81 07/18/2023      [] 0 = 4.0-5.6  [] 1 = 5.7-6.4  [] 2 = 6.5-6.9  [] 3 = 7.0-7.9  [] 4 = 8.0-8.9  [] 5 = 9.0-12   Hemoglobin (Anemia) <9 Delay surgery   Correct anemia Lab Results   Component Value Date    HGB 13.3 (L) 07/26/2023    [] 20 - <9.0                    Albumin <3 Delay surgery &Workup Lab Results   Component Value Date    ALBUMIN 3.9 01/27/2023    [] 20 - <3.0   Smoking Cessation >4 Weeks Delay Surgery  Refer to OP Cessation Class    Former Smoker  Quit: 1991 [] 20 - current smoker                                _____ PPD                    Hx of MI, PE, Arrhythmia, CVA, DVT <30 Days Delay Surgery    N/A [] 20      Infection Variable Delay surgery and re-evaluate   N/A [] 20 - recent/current infection     Depression (PHQ) >10 out of 27 Delay Surgery and re-evaluate  Medication  Counseling              [x] 0     []1     []2     []3      []4      [] 5                    (1-4)      (5-9)  (10-14)  (15-19)   (20-27)     Memory Impairment & Memory loss (Mini-Cog Screening Tool) Advanced dementia and/or Parkinson's Reconsider surgery     [x] 0     []1     []2     []3     []4     [] 5     Physical Conditioning (Modified AM-PAC Per Physical Therapy at [facility]) Unable to ambulate on day of surgery Delay surgery and " re-evaluate  Pre-Rehabilitation   (PT evaluation)       []  0   [x]4       []8     []12        []16     []20       (<20%)   (<40%)   (<60%)   (<80% )    (>80%)     Home Environment/Caregiver support  (Per /Navigator Interview)    Availability of basic services and/or approprate assistance during post-operative period Delay surgery and re-evaluate  Safe home environment  Health   1 week post-surgery  Transportation  availability  Ability to obtain DME/Medications post-op    [x] 0     []1     []2     []3     []4     [] 5  [x] 0     []1     []2     []3     []4     [] 5  [x] 0     []1     []2     []3     []4     [] 5  [x] 0     []1     []2     []3     []4     [] 5         MD Contact: Dr. Scott Comments:  Total Score:  6

## 2023-07-30 DIAGNOSIS — M62.838 MUSCLE SPASM: ICD-10-CM

## 2023-07-31 RX ORDER — TIZANIDINE 4 MG/1
TABLET ORAL
Qty: 30 TABLET | Refills: 0 | Status: SHIPPED | OUTPATIENT
Start: 2023-07-31 | End: 2023-09-11 | Stop reason: SDUPTHER

## 2023-08-01 ENCOUNTER — APPOINTMENT (OUTPATIENT)
Dept: LAB | Facility: HOSPITAL | Age: 73
End: 2023-08-01
Attending: ORTHOPAEDIC SURGERY
Payer: MEDICARE

## 2023-08-01 DIAGNOSIS — M17.11 PRIMARY OSTEOARTHRITIS OF RIGHT KNEE: Primary | ICD-10-CM

## 2023-08-01 DIAGNOSIS — Z01.818 PRE-OP EXAMINATION: Primary | ICD-10-CM

## 2023-08-01 LAB — MRSA SCREEN BY PCR: NEGATIVE

## 2023-08-01 PROCEDURE — 87641 MR-STAPH DNA AMP PROBE: CPT

## 2023-08-07 ENCOUNTER — ANESTHESIA EVENT (OUTPATIENT)
Dept: SURGERY | Facility: HOSPITAL | Age: 73
End: 2023-08-07
Payer: MEDICARE

## 2023-08-07 DIAGNOSIS — M17.11 PRIMARY OSTEOARTHRITIS OF RIGHT KNEE: Primary | ICD-10-CM

## 2023-08-07 RX ORDER — OXYCODONE AND ACETAMINOPHEN 5; 325 MG/1; MG/1
1 TABLET ORAL EVERY 6 HOURS PRN
Qty: 28 TABLET | Refills: 0 | Status: SHIPPED | OUTPATIENT
Start: 2023-08-07 | End: 2023-11-08

## 2023-08-07 RX ORDER — ACETAMINOPHEN 500 MG
1000 TABLET ORAL EVERY 8 HOURS PRN
Qty: 30 TABLET | Refills: 0 | Status: SHIPPED | OUTPATIENT
Start: 2023-08-07 | End: 2023-11-08

## 2023-08-07 RX ORDER — ASPIRIN 81 MG/1
81 TABLET ORAL 2 TIMES DAILY
Qty: 56 TABLET | Refills: 0 | Status: SHIPPED | OUTPATIENT
Start: 2023-08-07 | End: 2023-09-01 | Stop reason: SDUPTHER

## 2023-08-07 RX ORDER — IBUPROFEN 600 MG/1
600 TABLET ORAL EVERY 6 HOURS PRN
Qty: 30 TABLET | Refills: 0 | Status: SHIPPED | OUTPATIENT
Start: 2023-08-07 | End: 2023-08-16 | Stop reason: SDUPTHER

## 2023-08-07 RX ORDER — ONDANSETRON 4 MG/1
4 TABLET, FILM COATED ORAL EVERY 6 HOURS PRN
Qty: 30 TABLET | Refills: 0 | Status: SHIPPED | OUTPATIENT
Start: 2023-08-07 | End: 2023-08-21

## 2023-08-07 RX ORDER — CYCLOBENZAPRINE HCL 10 MG
10 TABLET ORAL 3 TIMES DAILY PRN
Qty: 30 TABLET | Refills: 0 | Status: SHIPPED | OUTPATIENT
Start: 2023-08-07 | End: 2023-08-17

## 2023-08-08 ENCOUNTER — ANESTHESIA (OUTPATIENT)
Dept: SURGERY | Facility: HOSPITAL | Age: 73
End: 2023-08-08
Payer: MEDICARE

## 2023-08-08 ENCOUNTER — HOSPITAL ENCOUNTER (OUTPATIENT)
Facility: HOSPITAL | Age: 73
Discharge: HOME | End: 2023-08-08
Attending: ORTHOPAEDIC SURGERY | Admitting: ORTHOPAEDIC SURGERY
Payer: MEDICARE

## 2023-08-08 DIAGNOSIS — Z01.818 PREOP TESTING: Primary | ICD-10-CM

## 2023-08-08 DIAGNOSIS — Z01.818 PRE-OP EXAM: ICD-10-CM

## 2023-08-08 DIAGNOSIS — M17.11 PRIMARY OSTEOARTHRITIS OF RIGHT KNEE: ICD-10-CM

## 2023-08-08 PROCEDURE — 97161 PT EVAL LOW COMPLEX 20 MIN: CPT

## 2023-08-08 PROCEDURE — 71000033 HC RECOVERY, INTIAL HOUR: Performed by: ORTHOPAEDIC SURGERY

## 2023-08-08 PROCEDURE — 63600175 PHARM REV CODE 636 W HCPCS: Performed by: ANESTHESIOLOGY

## 2023-08-08 PROCEDURE — 94761 N-INVAS EAR/PLS OXIMETRY MLT: CPT

## 2023-08-08 PROCEDURE — 63600175 PHARM REV CODE 636 W HCPCS

## 2023-08-08 PROCEDURE — 64447 NJX AA&/STRD FEMORAL NRV IMG: CPT | Mod: 59,RT,, | Performed by: ANESTHESIOLOGY

## 2023-08-08 PROCEDURE — 0055T BONE SRGRY CMPTR CT/MRI IMAG: CPT | Mod: ,,, | Performed by: ORTHOPAEDIC SURGERY

## 2023-08-08 PROCEDURE — C9290 INJ, BUPIVACAINE LIPOSOME: HCPCS | Performed by: ANESTHESIOLOGY

## 2023-08-08 PROCEDURE — 25000003 PHARM REV CODE 250: Performed by: ANESTHESIOLOGY

## 2023-08-08 PROCEDURE — 27447 PR TOTAL KNEE ARTHROPLASTY: ICD-10-PCS | Mod: RT,,, | Performed by: ORTHOPAEDIC SURGERY

## 2023-08-08 PROCEDURE — 97116 GAIT TRAINING THERAPY: CPT

## 2023-08-08 PROCEDURE — 63600175 PHARM REV CODE 636 W HCPCS: Performed by: ORTHOPAEDIC SURGERY

## 2023-08-08 PROCEDURE — 0055T PR COMPUTER-ASSIST MUSCSKEL NAVIG, ORTHO PROC, CT/MRI: ICD-10-PCS | Mod: ,,, | Performed by: ORTHOPAEDIC SURGERY

## 2023-08-08 PROCEDURE — D9220A PRA ANESTHESIA: ICD-10-PCS | Mod: ANES,,, | Performed by: ANESTHESIOLOGY

## 2023-08-08 PROCEDURE — 71000015 HC POSTOP RECOV 1ST HR: Performed by: ORTHOPAEDIC SURGERY

## 2023-08-08 PROCEDURE — 71000016 HC POSTOP RECOV ADDL HR: Performed by: ORTHOPAEDIC SURGERY

## 2023-08-08 PROCEDURE — 97110 THERAPEUTIC EXERCISES: CPT

## 2023-08-08 PROCEDURE — 37000009 HC ANESTHESIA EA ADD 15 MINS: Performed by: ORTHOPAEDIC SURGERY

## 2023-08-08 PROCEDURE — 27447 TOTAL KNEE ARTHROPLASTY: CPT | Mod: RT,,, | Performed by: ORTHOPAEDIC SURGERY

## 2023-08-08 PROCEDURE — D9220A PRA ANESTHESIA: ICD-10-PCS | Mod: CRNA,,, | Performed by: NURSE ANESTHETIST, CERTIFIED REGISTERED

## 2023-08-08 PROCEDURE — 63600175 PHARM REV CODE 636 W HCPCS: Performed by: NURSE ANESTHETIST, CERTIFIED REGISTERED

## 2023-08-08 PROCEDURE — 25000003 PHARM REV CODE 250: Performed by: ORTHOPAEDIC SURGERY

## 2023-08-08 PROCEDURE — 27200750 HC INSULATED NEEDLE/ STIMUPLEX: Performed by: ANESTHESIOLOGY

## 2023-08-08 PROCEDURE — D9220A PRA ANESTHESIA: Mod: ANES,,, | Performed by: ANESTHESIOLOGY

## 2023-08-08 PROCEDURE — 64447 NJX AA&/STRD FEMORAL NRV IMG: CPT | Performed by: ANESTHESIOLOGY

## 2023-08-08 PROCEDURE — 94799 UNLISTED PULMONARY SVC/PX: CPT

## 2023-08-08 PROCEDURE — 36000713 HC OR TIME LEV V EA ADD 15 MIN: Performed by: ORTHOPAEDIC SURGERY

## 2023-08-08 PROCEDURE — C1713 ANCHOR/SCREW BN/BN,TIS/BN: HCPCS | Performed by: ORTHOPAEDIC SURGERY

## 2023-08-08 PROCEDURE — 27201423 OPTIME MED/SURG SUP & DEVICES STERILE SUPPLY: Performed by: ORTHOPAEDIC SURGERY

## 2023-08-08 PROCEDURE — 71000039 HC RECOVERY, EACH ADD'L HOUR: Performed by: ORTHOPAEDIC SURGERY

## 2023-08-08 PROCEDURE — 27200688 HC TRAY, SPINAL-HYPER/ ISOBARIC: Performed by: ANESTHESIOLOGY

## 2023-08-08 PROCEDURE — 25000003 PHARM REV CODE 250: Performed by: NURSE ANESTHETIST, CERTIFIED REGISTERED

## 2023-08-08 PROCEDURE — 37000008 HC ANESTHESIA 1ST 15 MINUTES: Performed by: ORTHOPAEDIC SURGERY

## 2023-08-08 PROCEDURE — 64447 PERIPHERAL BLOCK: ICD-10-PCS | Mod: 59,RT,, | Performed by: ANESTHESIOLOGY

## 2023-08-08 PROCEDURE — C1776 JOINT DEVICE (IMPLANTABLE): HCPCS | Performed by: ORTHOPAEDIC SURGERY

## 2023-08-08 PROCEDURE — 36000712 HC OR TIME LEV V 1ST 15 MIN: Performed by: ORTHOPAEDIC SURGERY

## 2023-08-08 PROCEDURE — D9220A PRA ANESTHESIA: Mod: CRNA,,, | Performed by: NURSE ANESTHETIST, CERTIFIED REGISTERED

## 2023-08-08 DEVICE — TIBIAL BEARING INSERT
Type: IMPLANTABLE DEVICE | Site: KNEE | Status: FUNCTIONAL
Brand: TRIATHLON

## 2023-08-08 DEVICE — PATELLA
Type: IMPLANTABLE DEVICE | Site: KNEE | Status: FUNCTIONAL
Brand: TRIATHLON

## 2023-08-08 DEVICE — PRIMARY TIBIAL BASEPLATE
Type: IMPLANTABLE DEVICE | Site: KNEE | Status: FUNCTIONAL
Brand: TRIATHLON

## 2023-08-08 DEVICE — CEMENT BONE ANTIBIO SIMPLEX P: Type: IMPLANTABLE DEVICE | Site: KNEE | Status: FUNCTIONAL

## 2023-08-08 DEVICE — CRUCIATE RETAINING FEMORAL
Type: IMPLANTABLE DEVICE | Site: KNEE | Status: FUNCTIONAL
Brand: TRIATHLON

## 2023-08-08 RX ORDER — SODIUM CHLORIDE, SODIUM LACTATE, POTASSIUM CHLORIDE, CALCIUM CHLORIDE 600; 310; 30; 20 MG/100ML; MG/100ML; MG/100ML; MG/100ML
INJECTION, SOLUTION INTRAVENOUS CONTINUOUS
Status: CANCELLED | OUTPATIENT
Start: 2023-08-08

## 2023-08-08 RX ORDER — FENTANYL CITRATE 50 UG/ML
INJECTION, SOLUTION INTRAMUSCULAR; INTRAVENOUS
Status: DISCONTINUED | OUTPATIENT
Start: 2023-08-08 | End: 2023-08-08

## 2023-08-08 RX ORDER — MUPIROCIN 20 MG/G
OINTMENT TOPICAL
Status: DISCONTINUED | OUTPATIENT
Start: 2023-08-08 | End: 2023-08-08 | Stop reason: HOSPADM

## 2023-08-08 RX ORDER — LIDOCAINE HYDROCHLORIDE 20 MG/ML
INJECTION INTRAVENOUS
Status: DISCONTINUED | OUTPATIENT
Start: 2023-08-08 | End: 2023-08-08

## 2023-08-08 RX ORDER — PHENYLEPHRINE HYDROCHLORIDE 10 MG/ML
INJECTION INTRAVENOUS
Status: DISCONTINUED | OUTPATIENT
Start: 2023-08-08 | End: 2023-08-08

## 2023-08-08 RX ORDER — BUPIVACAINE HYDROCHLORIDE 5 MG/ML
INJECTION, SOLUTION EPIDURAL; INTRACAUDAL
Status: DISCONTINUED | OUTPATIENT
Start: 2023-08-08 | End: 2023-08-08

## 2023-08-08 RX ORDER — FENTANYL CITRATE 50 UG/ML
25 INJECTION, SOLUTION INTRAMUSCULAR; INTRAVENOUS EVERY 5 MIN PRN
Status: ACTIVE | OUTPATIENT
Start: 2023-08-08

## 2023-08-08 RX ORDER — ONDANSETRON 2 MG/ML
4 INJECTION INTRAMUSCULAR; INTRAVENOUS ONCE AS NEEDED
Status: ACTIVE | OUTPATIENT
Start: 2023-08-08 | End: 2035-01-04

## 2023-08-08 RX ORDER — MIDAZOLAM HYDROCHLORIDE 1 MG/ML
INJECTION, SOLUTION INTRAMUSCULAR; INTRAVENOUS
Status: DISCONTINUED | OUTPATIENT
Start: 2023-08-08 | End: 2023-08-08

## 2023-08-08 RX ORDER — MIDAZOLAM HYDROCHLORIDE 1 MG/ML
2 INJECTION INTRAMUSCULAR; INTRAVENOUS
Status: ACTIVE | OUTPATIENT
Start: 2023-08-08

## 2023-08-08 RX ORDER — FENTANYL CITRATE 50 UG/ML
25-200 INJECTION, SOLUTION INTRAMUSCULAR; INTRAVENOUS
Status: ACTIVE | OUTPATIENT
Start: 2023-08-08

## 2023-08-08 RX ORDER — DEXAMETHASONE SODIUM PHOSPHATE 4 MG/ML
INJECTION, SOLUTION INTRA-ARTICULAR; INTRALESIONAL; INTRAMUSCULAR; INTRAVENOUS; SOFT TISSUE
Status: DISCONTINUED | OUTPATIENT
Start: 2023-08-08 | End: 2023-08-08

## 2023-08-08 RX ORDER — BUPIVACAINE HYDROCHLORIDE 7.5 MG/ML
INJECTION, SOLUTION INTRASPINAL
Status: DISCONTINUED | OUTPATIENT
Start: 2023-08-08 | End: 2023-08-08

## 2023-08-08 RX ORDER — PROPOFOL 10 MG/ML
VIAL (ML) INTRAVENOUS CONTINUOUS PRN
Status: DISCONTINUED | OUTPATIENT
Start: 2023-08-08 | End: 2023-08-08

## 2023-08-08 RX ORDER — LIDOCAINE HYDROCHLORIDE 10 MG/ML
1 INJECTION, SOLUTION EPIDURAL; INFILTRATION; INTRACAUDAL; PERINEURAL ONCE
Status: ACTIVE | OUTPATIENT
Start: 2023-08-08

## 2023-08-08 RX ORDER — ONDANSETRON 2 MG/ML
INJECTION INTRAMUSCULAR; INTRAVENOUS
Status: DISCONTINUED | OUTPATIENT
Start: 2023-08-08 | End: 2023-08-08

## 2023-08-08 RX ORDER — ACETAMINOPHEN 500 MG
1000 TABLET ORAL
Status: COMPLETED | OUTPATIENT
Start: 2023-08-08 | End: 2023-08-08

## 2023-08-08 RX ORDER — OXYCODONE HYDROCHLORIDE 5 MG/1
5 TABLET ORAL ONCE AS NEEDED
Status: ACTIVE | OUTPATIENT
Start: 2023-08-08 | End: 2035-01-04

## 2023-08-08 RX ORDER — OXYCODONE HCL 10 MG/1
10 TABLET, FILM COATED, EXTENDED RELEASE ORAL ONCE
Status: COMPLETED | OUTPATIENT
Start: 2023-08-08 | End: 2023-08-08

## 2023-08-08 RX ORDER — TRANEXAMIC ACID 100 MG/ML
INJECTION, SOLUTION INTRAVENOUS
Status: DISCONTINUED | OUTPATIENT
Start: 2023-08-08 | End: 2023-08-08

## 2023-08-08 RX ORDER — NAPROXEN SODIUM 220 MG/1
81 TABLET, FILM COATED ORAL 2 TIMES DAILY
Status: DISCONTINUED | OUTPATIENT
Start: 2023-08-08 | End: 2023-08-08 | Stop reason: HOSPADM

## 2023-08-08 RX ORDER — CELECOXIB 100 MG/1
400 CAPSULE ORAL ONCE
Status: COMPLETED | OUTPATIENT
Start: 2023-08-08 | End: 2023-08-08

## 2023-08-08 RX ADMIN — PHENYLEPHRINE HYDROCHLORIDE 200 MCG: 10 INJECTION INTRAVENOUS at 12:08

## 2023-08-08 RX ADMIN — TRANEXAMIC ACID 1000 MG: 100 INJECTION, SOLUTION INTRAVENOUS at 11:08

## 2023-08-08 RX ADMIN — SODIUM CHLORIDE, SODIUM GLUCONATE, SODIUM ACETATE, POTASSIUM CHLORIDE AND MAGNESIUM CHLORIDE: 526; 502; 368; 37; 30 INJECTION, SOLUTION INTRAVENOUS at 11:08

## 2023-08-08 RX ADMIN — PHENYLEPHRINE HYDROCHLORIDE 100 MCG: 10 INJECTION INTRAVENOUS at 11:08

## 2023-08-08 RX ADMIN — FENTANYL CITRATE 25 MCG: 50 INJECTION, SOLUTION INTRAMUSCULAR; INTRAVENOUS at 11:08

## 2023-08-08 RX ADMIN — PHENYLEPHRINE HYDROCHLORIDE 100 MCG: 10 INJECTION INTRAVENOUS at 12:08

## 2023-08-08 RX ADMIN — ACETAMINOPHEN 1000 MG: 500 TABLET ORAL at 09:08

## 2023-08-08 RX ADMIN — CLINDAMYCIN PHOSPHATE 900 MG: 150 INJECTION, SOLUTION INTRAVENOUS at 11:08

## 2023-08-08 RX ADMIN — MIDAZOLAM 1 MG: 1 INJECTION INTRAMUSCULAR; INTRAVENOUS at 10:08

## 2023-08-08 RX ADMIN — BUPIVACAINE HYDROCHLORIDE IN DEXTROSE 1.4 ML: 7.5 INJECTION, SOLUTION SUBARACHNOID at 11:08

## 2023-08-08 RX ADMIN — ROPIVACAINE HYDROCHLORIDE: 5 INJECTION, SOLUTION EPIDURAL; INFILTRATION; PERINEURAL at 11:08

## 2023-08-08 RX ADMIN — DEXAMETHASONE SODIUM PHOSPHATE 8 MG: 4 INJECTION, SOLUTION INTRAMUSCULAR; INTRAVENOUS at 11:08

## 2023-08-08 RX ADMIN — MUPIROCIN: 20 OINTMENT TOPICAL at 08:08

## 2023-08-08 RX ADMIN — BUPIVACAINE HYDROCHLORIDE 10 ML: 5 INJECTION, SOLUTION EPIDURAL; INTRACAUDAL; PERINEURAL at 10:08

## 2023-08-08 RX ADMIN — ONDANSETRON 4 MG: 2 INJECTION INTRAMUSCULAR; INTRAVENOUS at 11:08

## 2023-08-08 RX ADMIN — FENTANYL CITRATE 50 MCG: 50 INJECTION, SOLUTION INTRAMUSCULAR; INTRAVENOUS at 10:08

## 2023-08-08 RX ADMIN — MIDAZOLAM 1 MG: 1 INJECTION INTRAMUSCULAR; INTRAVENOUS at 11:08

## 2023-08-08 RX ADMIN — PROPOFOL 50 MCG/KG/MIN: 10 INJECTION, EMULSION INTRAVENOUS at 11:08

## 2023-08-08 RX ADMIN — TRANEXAMIC ACID 1000 MG: 100 INJECTION, SOLUTION INTRAVENOUS at 12:08

## 2023-08-08 RX ADMIN — GLYCOPYRROLATE 0.2 MG: 0.2 INJECTION, SOLUTION INTRAMUSCULAR; INTRAVITREAL at 11:08

## 2023-08-08 RX ADMIN — OXYCODONE HYDROCHLORIDE 10 MG: 10 TABLET, FILM COATED, EXTENDED RELEASE ORAL at 09:08

## 2023-08-08 RX ADMIN — SODIUM CHLORIDE, SODIUM GLUCONATE, SODIUM ACETATE, POTASSIUM CHLORIDE AND MAGNESIUM CHLORIDE: 526; 502; 368; 37; 30 INJECTION, SOLUTION INTRAVENOUS at 09:08

## 2023-08-08 RX ADMIN — CELECOXIB 400 MG: 100 CAPSULE ORAL at 09:08

## 2023-08-08 RX ADMIN — BUPIVACAINE 20 ML: 13.3 INJECTION, SUSPENSION, LIPOSOMAL INFILTRATION at 10:08

## 2023-08-08 RX ADMIN — LIDOCAINE HYDROCHLORIDE 20 MG: 20 INJECTION, SOLUTION INTRAVENOUS at 11:08

## 2023-08-08 NOTE — ANESTHESIA PROCEDURE NOTES
Spinal    Diagnosis: right knee  Patient location during procedure: OR  Start time: 8/8/2023 11:27 AM  Timeout: 8/8/2023 11:27 AM  End time: 8/8/2023 11:33 AM    Staffing  Authorizing Provider: Jesse Spann MD  Performing Provider: Jesse Spann MD    Staffing  Performed by: Jesse Spann MD  Authorized by: Jesse Spann MD    Spinal Block  Patient position: sitting  Prep: ChloraPrep  Patient monitoring: heart rate, frequent blood pressure checks and continuous pulse ox  Approach: left paramedian  Interspace: L1-L2.  Injection technique: single shot  Needle  Needle type: Quincke   Needle gauge: 22 G  Needle length: 3.5 in  Additional Documentation: incremental injection, negative aspiration for heme and no paresthesia on injection  Needle localization: anatomical landmarks (above incision site)  Assessment  Sensory level: T10   Dermatomal levels determined by pinch or prick  Ease of block: moderate  Patient's tolerance of the procedure: comfortable throughout block and no complaints

## 2023-08-08 NOTE — ANESTHESIA POSTPROCEDURE EVALUATION
Anesthesia Post Evaluation    Patient: Dayton Faye    Procedure(s) Performed: Procedure(s) (LRB):  ROBOTIC ARTHROPLASTY, KNEE, TOTAL (Right)    Final Anesthesia Type: spinal      Patient location during evaluation: PACU  Patient participation: Yes- Able to Participate  Level of consciousness: sedated and awake  Post-procedure vital signs: reviewed and stable  Pain management: adequate  Airway patency: patent    PONV status at discharge: No PONV  Anesthetic complications: no      Cardiovascular status: blood pressure returned to baseline  Respiratory status: spontaneous ventilation  Hydration status: euvolemic  Follow-up not needed.          Vitals Value Taken Time   /66 08/08/23 1355   Temp 36.2 °C (97.2 °F) 08/08/23 1310   Pulse 66 08/08/23 1357   Resp 21 08/08/23 1357   SpO2 99 % 08/08/23 1357   Vitals shown include unvalidated device data.      No case tracking events are documented in the log.      Pain/Sahra Score: Pain Rating Prior to Med Admin: 1 (8/8/2023  9:01 AM)  Sahra Score: 10 (8/8/2023  1:45 PM)

## 2023-08-08 NOTE — PT/OT/SLP EVAL
Physical Therapy Evaluation and Discharge Note    Patient Name:  Dayton Faye   MRN:  [id number]    Recommendations:     Discharge Recommendations: home health PT  Discharge Equipment Recommendations: none (has RW)   Barriers to discharge: None    Assessment:     Dayton Faye is a 72 y.o. male admitted with a medical diagnosis of <principal problem not specified>. .  Pt seen at post op 3 alert, motivated and eager for therapy. Pt completed thera ex in supine. Min assist to stand with RW. Pt ambulated 250ft with RW min assist. Attempted bathroom use to void but unsuccessful. OOB chair.  Pt to benefit from continued therapies HHPT    Recent Surgery: Procedure(s) (LRB):  ROBOTIC ARTHROPLASTY, KNEE, TOTAL (Right) Day of Surgery    Plan:     During this hospitalization, patient does not require further acute PT services.  Please re-consult if situation changes.      Subjective   Stated that he had his L TKA 2020 and did well  Stated that he still feels numb and unable to void  Chief Complaint: no pain  Patient/Family Comments/goals: get home  Pain/Comfort:  Pain Rating 1: 0/10    Patients cultural, spiritual, Oriental orthodox conflicts given the current situation:      Living Environment:  Home with SO  Prior to admission, patients level of function was independent.  Equipment used at home: none.  DME owned (not currently used): rolling walker.  Upon discharge, patient will have assistance from family.    Objective:     Communicated with nurse Alejo prior to session.  Patient found HOB elevated with   upon PT entry to room.    General Precautions: Standard, fall    Orthopedic Precautions:RLE weight bearing as tolerated   Braces: N/A  Respiratory Status: Room air    Exams:  Postural Exam:  Patient presented with the following abnormalities:    -       Rounded shoulders  -       Forward head  -       BMI 30  RLE ROM: Deficits: RK flexion ~80*  RLE Strength: Deficits: 3/5  LLE ROM: WFL  LLE Strength: WFL    Functional Mobility:  Bed  Mobility:     Scooting: minimum assistance  Supine to Sit: minimum assistance  Transfers:     Sit to Stand:  minimum assistance with rolling walker  Bed to Chair: minimum assistance with  rolling walker  using  Stand Pivot  Gait: 250ft with RW min assist with chair following     AM-PAC 6 CLICK MOBILITY  Total Score:16       Treatment and Education:  Patient was educated on the importance of OOB activity and functional mobility to negate negative effects of prolonged bed rest during hospitalization, safe transfers and ambulation, and D/C planning   Pt completed AP,QS,GS,SLR and HS  Gait at hallways with RW  OOB chair    AM-PAC 6 CLICK MOBILITY  Total Score:16     Patient left up in chair with all lines intact, call button in reach, nurse Sapna notified, and SO present.    GOALS:   Multidisciplinary Problems       Physical Therapy Goals       Not on file                    History:     Past Medical History:   Diagnosis Date    General anesthetics causing adverse effect in therapeutic use     takes longer to wake up after anesthesia    Hypertension     PONV (postoperative nausea and vomiting)     Pre-diabetes     Retinal detachment        Past Surgical History:   Procedure Laterality Date    BACK SURGERY      CATARACT EXTRACTION Bilateral     EYE SURGERY      HIP SURGERY Right     JOINT REPLACEMENT  2016    Right hip joint replaced    KNEE SURGERY Left     RETINAL DETACHMENT SURGERY Left     SHOULDER SURGERY Right     SHOULDER SURGERY Left     SPINE SURGERY      lumbar fusion       Time Tracking:     PT Received On: 08/08/23  PT Start Time: 1507     PT Stop Time: 1545  PT Total Time (min): 38 min     Billable Minutes: Evaluation 10, Gait Training 20, and Therapeutic Exercise 8      08/08/2023

## 2023-08-08 NOTE — DISCHARGE INSTRUCTIONS
"Discharge Instructions: After Your Surgery/Procedure  Youve just had surgery. During surgery you were given medicine called anesthesia to keep you relaxed and free of pain. After surgery you may have some pain or nausea. This is common. Here are some tips for feeling better and getting well after surgery.     Stay on schedule with your medication.   Going home  Your doctor or nurse will show you how to take care of yourself when you go home. He or she will also answer your questions. Have an adult family member or friend drive you home.      For your safety we recommend these precaution for the first 24 hours after your procedure:  Do not drive or use heavy equipment.  Do not make important decisions or sign legal papers.  Do not drink alcohol.  Have someone stay with you, if needed. He or she can watch for problems and help keep you safe.  Your concentration, balance, coordination, and judgement may be impaired for many hours after anesthesia.  Use caution when ambulating or standing up.     You may feel weak and "washed out" after anesthesia and surgery.      Subtle residual effects of general anesthesia or sedation with regional / local anesthesia can last more than 24 hours.  Rest for the remainder of the day or longer if your Doctor/Surgeon has advised you to do so.  Although you may feel normal within the first 24 hours, your reflexes and mental ability may be impaired without you realizing it.  You may feel dizzy, lightheaded or sleepy for 24 hours or longer.      Be sure to go to all follow-up visits with your doctor. And rest after your surgery for as long as your doctor tells you to.  Coping with pain  If you have pain after surgery, pain medicine will help you feel better. Take it as told, before pain becomes severe. Also, ask your doctor or pharmacist about other ways to control pain. This might be with heat, ice, or relaxation. And follow any other instructions your surgeon or nurse gives you.  Tips " for taking pain medicine  To get the best relief possible, remember these points:  Pain medicines can upset your stomach. Taking them with a little food may help.  Most pain relievers taken by mouth need at least 20 to 30 minutes to start to work.  Taking medicine on a schedule can help you remember to take it. Try to time your medicine so that you can take it before starting an activity. This might be before you get dressed, go for a walk, or sit down for dinner.  Constipation is a common side effect of pain medicines. Call your doctor before taking any medicines such as laxatives or stool softeners to help ease constipation. Also ask if you should skip any foods. Drinking lots of fluids and eating foods such as fruits and vegetables that are high in fiber can also help. Remember, do not take laxatives unless your surgeon has prescribed them.  Drinking alcohol and taking pain medicine can cause dizziness and slow your breathing. It can even be deadly. Do not drink alcohol while taking pain medicine.  Pain medicine can make you react more slowly to things. Do not drive or run machinery while taking pain medicine.  Your health care provider may tell you to take acetaminophen to help ease your pain. Ask him or her how much you are supposed to take each day. Acetaminophen or other pain relievers may interact with your prescription medicines or other over-the-counter (OTC) drugs. Some prescription medicines have acetaminophen and other ingredients. Using both prescription and OTC acetaminophen for pain can cause you to overdose. Read the labels on your OTC medicines with care. This will help you to clearly know the list of ingredients, how much to take, and any warnings. It may also help you not take too much acetaminophen. If you have questions or do not understand the information, ask your pharmacist or health care provider to explain it to you before you take the OTC medicine.  Managing nausea  Some people have an  upset stomach after surgery. This is often because of anesthesia, pain, or pain medicine, or the stress of surgery. These tips will help you handle nausea and eat healthy foods as you get better. If you were on a special food plan before surgery, ask your doctor if you should follow it while you get better. These tips may help:  Do not push yourself to eat. Your body will tell you when to eat and how much.  Start off with clear liquids and soup. They are easier to digest.  Next try semi-solid foods, such as mashed potatoes, applesauce, and gelatin, as you feel ready.  Slowly move to solid foods. Dont eat fatty, rich, or spicy foods at first.  Do not force yourself to have 3 large meals a day. Instead eat smaller amounts more often.  Take pain medicines with a small amount of solid food, such as crackers or toast, to avoid nausea.     Call your surgeon if  You still have pain an hour after taking medicine. The medicine may not be strong enough.  You feel too sleepy, dizzy, or groggy. The medicine may be too strong.  You have side effects like nausea, vomiting, or skin changes, such as rash, itching, or hives.       If you have obstructive sleep apnea  You were given anesthesia medicine during surgery to keep you comfortable and free of pain. After surgery, you may have more apnea spells because of this medicine and other medicines you were given. The spells may last longer than usual.   At home:  Keep using the continuous positive airway pressure (CPAP) device when you sleep. Unless your health care provider tells you not to, use it when you sleep, day or night. CPAP is a common device used to treat obstructive sleep apnea.  Talk with your provider before taking any pain medicine, muscle relaxants, or sedatives. Your provider will tell you about the possible dangers of taking these medicines.  © 7111-9679 [employer]. [address] [place] [address], [place] [address]. All rights reserved. This information is  not intended as a substitute for professional medical care. Always follow your healthcare professional's instructions.           Using an Incentive Spirometer    An incentive spirometer is a device that helps you do deep breathing exercises. These exercises expand your lungs, aid in circulation, and help prevent pneumonia. Deep breathing exercises also help you breathe better and improve the function of your lungs by:  Keeping your lungs clear  Strengthening your breathing muscles  Helping prevent respiratory complications or problems  The incentive spirometer gives you a way to take an active part in recover. A nurse or therapist will teach you breathing exercises. To do these exercises, you will breathe in through your mouth and not your nose. The incentive spirometer only works correctly if you breathe in through your mouth.  Steps to clear lungs  Step 1. Exhale normally. Then, inhale normally.  Relax and breathe out.  Step 2. Place your lips tightly around the mouthpiece.  Make sure the device is upright and not tilted.  Step 3. Inhale as much air as you can through the mouthpiece (don't breath through your nose).  Inhale slowly and deeply.  Hold your breath long enough to keep the balls or disk raised for at least 3 to 5 seconds, or as instructed by your healthcare provider.  Some spirometers have an indicator to let you know that you are breathing in too fast. If the indicator goes off, breathe in more slowly.  Step 4. Repeat the exercise regularly.  Do this exercise every hour while you're awake, or as instructed by your healthcare provider.  If you were taught deep breathing and coughing exercises, do them regularly as instructed by your healthcare provider.            Post op instructions for prevention of DVT  What is deep vein thrombosis?  Deep vein thrombosis (DVT) is the medical term for blood clots in the deep veins of the leg.  These blood clots can be dangerous.  A DVT can block a blood vessel and  keep blood from getting where it needs to go.  Another problem is that the clot can travel to other parts of the body such as the lungs.  A clot that travels to the lungs is called a pulmonary embolus (PE) and can cause serious problems with breathing which can lead to death.  Am I at risk for DVT/PE?  If you are not very active, you are at risk of DVT.  Anyone confined to bed, sitting for long periods of time, recovering from surgery, etc. increases the risk of DVT.  Other risk factors are cancer diagnosis, certain medications, estrogen replacement in any form,older age, obesity, pregnancy, smoking, history of clotting disorders, and dehydration.  How will I know if I have a DVT?  Swelling in the lower leg  Pain  Warmth, redness, hardness or bulging of the vein  If you have any of these symptoms, call your doctors office right away.  Some people will not have any symptoms until the clot moves to the lungs.  What are the symptoms of a PE?  Panting, shortness of breath, or trouble breathing  Sharp, knife-like chest pain when you breathe  Coughing or coughing up blood  Rapid heartbeat  If you have any of these symptoms or get worse quickly, call 911 for emergency treatment.  How can I prevent a DVT?  Avoid long periods of inactivity and dont cross your legs--get up and walk around every hour or so.  Stay active--walking after surgery is highly encouraged.  This means you should get out of the house and walk in the neighborhood.  Going up and down stairs will not impair healing (unless advised against such activity by your doctor).    Drink plenty of noncaffeinated, nonalcoholic fluids each day to prevent dehydration.  Wear special support stockings, if they have been advised by your doctor.  If you travel, stop at least once an hour and walk around.  Avoid smoking (assistance with stopping is available through your healthcare provider)  Always notify your doctor if you are not able to follow the post operative  instructions that are given to you at the time of discharge.  It may be necessary to prescribe one of the medications available to prevent DVT.           Exparel(bupivacaine) has been injected to provide approximately 72 hours of reduced pain after your surgery.  Do not remove the bracelet for five days.  Report to your doctor as soon as possible if you experience any of the following:   Restlessness   Anxiety   Speech problems    Lightheadedness   Numbness and tingling of the mouth and lips   Seizures    Metallic taste   Blurred vision   Tremors    Twitching   Depression   Extreme drowsiness  Avoid additional use of local anesthetics (such as dental procedures) for five days (96 hours).           We hope your stay was comfortable as you heal now, mend and rest.    For we have enjoyed taking care of you by giving your our best.    And as you get better, by regaining your health and strength;   We count it as a privilege to have served you and hope your time at [facility] was well spent.      Thank  You!!!

## 2023-08-08 NOTE — PLAN OF CARE
Patient received from recovery at this time.  AAOX3.  NAD noted.  Dressing to right knee remains clean, dry and intact. Tolerating po intake well with no complaints of nausea/vomiting.  Patient able to move BLE with no problems noted.  Due to void. Home medications delivered to family member in waiting area.  Patient has a walker for home use.

## 2023-08-08 NOTE — ANESTHESIA PREPROCEDURE EVALUATION
08/08/2023  Dayton Faye is a 72 y.o., male.      Pre-op Assessment    I have reviewed the Patient Summary Reports.     I have reviewed the Nursing Notes. I have reviewed the NPO Status.   I have reviewed the Medications.     Review of Systems  Cardiovascular:   Hypertension hyperlipidemia    Pulmonary:   Shortness of breath    Renal/:   Chronic Renal Disease, CKD    Hepatic/GI:   GERD    Musculoskeletal:   Arthritis  Right knee   Endocrine:  Endocrine Normal  Obesity / BMI > 30      Physical Exam  General: Well nourished    Airway:  Mallampati: III / II  Neck ROM: Normal ROM    Dental:  Intact    Chest/Lungs:  Clear to auscultation, Normal Respiratory Rate    Heart:  Rate: Normal  Rhythm: Regular Rhythm        Anesthesia Plan  Type of Anesthesia, risks & benefits discussed:    Anesthesia Type: Spinal, Gen ETT  Post Op Pain Control Plan: multimodal analgesia, IV/PO Opioids PRN and peripheral nerve block  Induction:  IV  Informed Consent: Informed consent signed with the Patient and all parties understand the risks and agree with anesthesia plan.  All questions answered.   ASA Score: 2    Ready For Surgery From Anesthesia Perspective.     .

## 2023-08-08 NOTE — PROGRESS NOTES
Criteria per anesthesia for transfer to post op . Tolerating po fluids Refuses analgesic at this time Transferred per stretcher to phase 2 Report given to Sapna

## 2023-08-08 NOTE — H&P
"Past Medical History:   Diagnosis Date    GERD (gastroesophageal reflux disease)      Hyperlipidemia      Hypertension      Retinal detachment                 Past Surgical History:   Procedure Laterality Date    CATARACT EXTRACTION Bilateral      HIP SURGERY Right      JOINT REPLACEMENT   2016     Right hip joint replaced    KNEE SURGERY Left      RETINAL DETACHMENT SURGERY Left      SHOULDER SURGERY Right      SHOULDER SURGERY Left      SPINE SURGERY         lumbar fusion              Current Outpatient Medications   Medication Sig    atorvastatin (LIPITOR) 20 MG tablet Take 1 tablet (20 mg total) by mouth once daily.    CLARITIN-D 24 HOUR  mg per 24 hr tablet Take 1 tablet by mouth.    coenzyme Q10 100 mg capsule Take 200 mg by mouth.    furosemide (LASIX) 40 MG tablet Take 1 tablet (40 mg total) by mouth daily as needed (swelling).    ginkgo biloba 120 mg Tab Take by mouth once daily.    krill-om-3-dha-epa-phospho-ast 158-955-06-64 mg Cap Take by mouth once daily.    losartan (COZAAR) 100 MG tablet Take 1 tablet (100 mg total) by mouth once daily.    mv-min-folic acid-lutein 500-250 mcg Chew Take by mouth.    pantoprazole (PROTONIX) 40 MG tablet Take 1 tablet (40 mg total) by mouth once daily.    prednisoLONE acetate (PRED MILD) 0.12 % ophthalmic suspension Place 1 drop into the left eye every morning.    tiZANidine (ZANAFLEX) 4 MG tablet Take 1 tablet (4 mg total) by mouth nightly as needed (muscle spasms).    vit C/E/Zn/coppr/lutein/zeaxan (PRESERVISION AREDS-2 ORAL) Take by mouth 2 (two) times a day.      No current facility-administered medications for this visit.               Review of patient's allergies indicates:   Allergen Reactions    Adhesive Rash       Patients states "surgical tape."    Latex, natural rubber Rash               Family History   Problem Relation Age of Onset    Macular degeneration Father      Heart disease Father      Arthritis Father            at age 92    Hypertension " Father      Vision loss Father      No Known Problems Sister      No Known Problems Brother      Cancer Paternal Grandmother           Lung    Cancer Paternal Grandfather           Prostate    Glaucoma Neg Hx           Social History               Socioeconomic History    Marital status:    Occupational History    Occupation: School psychologist       Comment: retired   Tobacco Use    Smoking status: Former       Packs/day: 0.50       Years: 5.00       Pack years: 2.50       Types: Cigarettes, Pipe       Quit date: 1991       Years since quittin.4    Smokeless tobacco: Never   Substance and Sexual Activity    Alcohol use: Not Currently       Comment: Very occasionally    Drug use: Never    Sexual activity: Yes       Partners: Female       Birth control/protection: Post-menopausal   Social History Narrative     Lives with female partner.  Moved here from [place]. Two cats. Enjoys ancestry, reading.            Chief Complaint:   No chief complaint on file.        History of present illness:  72-year-old male with a history of osteoarthritis throughout his body.  Patient had a previous left total knee done on 2022 while he lived in [place].  Patient recently moved to [place] in December.  His right knee gives him significant pain.  Pain is a 3/10.  He's had cortisone injections and viscosupplementation in the past.  We gave him a shot back in January.  Lasted a few months but the pain is gotten worse.  He would like to go ahead and just get this replaced.        Answers submitted by the patient for this visit:  Orthopedics Questionnaire (Submitted on 2022)  unexpected weight change: No  appetite change : No  sleep disturbance: No  IMMUNOCOMPROMISED: No  nervous/ anxious: No  dysphoric mood: No  rash: No  visual disturbance: No  eye redness: No  eye pain: No  ear pain: No  tinnitus: Yes  hearing loss: Yes  sinus pressure : No  nosebleeds: No  enviro allergies: Yes  food allergies:  No  cough: No  shortness of breath: No  sweating: Yes  frequency: Yes  difficulty urinating: No  hematuria: No  chest pain: No  palpitations: No  nausea: No  vomiting: No  diarrhea: No  blood in stool: No  constipation: No  headaches: No  dizziness: No  numbness: Yes  seizures: No  joint swelling: No  myalgia: Yes  weakness: Yes  back pain: Yes   (Submitted on 12/29/2022)  Chief Complaint: Leg pain  Pain Chronicity: chronic  History of trauma: No  Onset: more than 1 month ago  Frequency: daily  Progression since onset: waxing and waning  Injury mechanism: pulling  injury location: at home  pain- numeric: 6/10  pain location: right knee, left foot  pain quality: burning  Radiating Pain: No  Aggravating factors: bearing weight, standing, sitting  fever: No  inability to bear weight: Yes  itching: No  joint locking: No  limited range of motion: No  stiffness: No  tingling: Yes  Treatments tried: brace/corset, cold, injection treatment, rest  physical therapy: not tried  Improvement on treatment: moderate        Physical Examination:     Vital Signs:  There were no vitals filed for this visit.     There is no height or weight on file to calculate BMI.     This a well-developed, well nourished patient in no acute distress.  They are alert and oriented and cooperative to examination.  Pt. walks without an antalgic gait.       Examination of the right knee shows no rashes or erythema. There are no masses ecchymosis or effusion. Patient has range of motion from 3-130°. Patient is mildly tender to palpation over lateral joint line and moderately tender to palpation over the medial joint line. Patient has a - Lachman exam, - anterior drawer exam, and - posterior drawer exam. - Jair's exam. Knee is stable to varus and valgus stress. 5 out of 5 motor strength. Palpable distal pulses. Intact light touch sensation. Negative Patellofemoral crepitus     Heart is regular rate without obvious murmurs   Normal respiratory effort  without audible wheezing  Abdomen is soft and nontender         X-rays:  X-rays of the right knee are  reviewed which show severe lateral compartment narrowing of the right knee.  Prior left total knee arthroplasty without obvious complications.     Assessment::  Right valgus knee arthritis     Plan:  I reviewed the findings with him today.  Patient is very familiar with his arthritis.  We talked about treatment options ranging from injections to knee replacement.  He would like to go ahead and schedule knee replacement.  Plan is for right robotic assisted total knee arthroplasty.  Risks, benefits, and alternatives to the procedure were explained to the patient including but not limited to damage to nerves, arteries, blood vessels, bones, tendons, ligaments, stiffness, instability, infection, permanent limb dysfunction, DVT, PE, as well as general anesthetic complications including seizure, stroke, heart attack and even death. The patient understood these risks and wished to proceed and signed the informed consent.         This note was created using [employer] voice recognition software that occasionally misinterpreted phrases or words.     Consult note is delivered via Epic messaging service.

## 2023-08-08 NOTE — ANESTHESIA PROCEDURE NOTES
Peripheral Block    Patient location during procedure: pre-op   Block not for primary anesthetic.  Reason for block: at surgeon's request and post-op pain management   Post-op Pain Location: right knee   Start time: 8/8/2023 10:20 AM  Timeout: 8/8/2023 10:20 AM   End time: 8/8/2023 10:26 AM    Staffing  Authorizing Provider: Jesse Spann MD  Performing Provider: Jesse Spann MD    Staffing  Performed by: Jesse Spann MD  Authorized by: Jesse Spann MD    Preanesthetic Checklist  Completed: patient identified, IV checked, site marked, risks and benefits discussed, surgical consent, monitors and equipment checked, pre-op evaluation and timeout performed  Peripheral Block  Patient position: supine  Prep: ChloraPrep  Patient monitoring: heart rate, cardiac monitor, continuous pulse ox, continuous capnometry and frequent blood pressure checks  Block type: adductor canal  Laterality: right  Injection technique: single shot  Needle  Needle type: Stimuplex   Needle gauge: 21 G  Needle length: 4 in  Needle localization: anatomical landmarks and ultrasound guidance   -ultrasound image captured on disc.  Assessment  Injection assessment: negative aspiration, negative parasthesia, local visualized surrounding nerve and positive parasthesia  Paresthesia pain: immediately resolved  Heart rate change: no  Slow fractionated injection: yes  Pain Tolerance: comfortable throughout block and no complaints  Medications:    Medications: BUPivacaine liposome (PF) 1.3 % (13.3 mg/mL) suspension - Injection, Right Quadriceps   20 mL - 8/8/2023 10:26:00 AM  bupivacaine (pf) (MARCAINE) injection 0.5% - Perineural   10 mL - 8/8/2023 10:26:00 AM    Additional Notes  VSS.  [facility] RN monitoring vitals throughout procedure.  Patient tolerated procedure well.

## 2023-08-08 NOTE — OP NOTE
[facility]  Orthopedic Surgery  Operative Note    SUMMARY     Date of Procedure: 8/8/2023     Procedure: Procedure(s) (LRB):  ROBOTIC ARTHROPLASTY, KNEE, TOTAL (Right)       Surgeon(s) and Role:     * Fili Scott MD - Primary    Assistant: Ameya KEMP    Pre-Operative Diagnosis: Primary osteoarthritis of right knee [M17.11]  Pre-op exam [Z01.818]    Post-Operative Diagnosis: Post-Op Diagnosis Codes:     * Primary osteoarthritis of right knee [M17.11]     * Pre-op exam [Z01.818]    Anesthesia: Choice    Complications: No    Estimated Blood Loss (EBL): 40ml           Implants:   Implant Name Type Inv. Item Serial No.  Lot No. LRB No. Used Action   PIN BONE 4 X 140MM STERILE - [id number]  PIN BONE 4 X 140MM STERILE  [employer] [id number] Right 1 Implanted and Explanted   CEMENT BONE ANTIBIO SIMPLEX P - [id number]  CEMENT BONE ANTIBIO SIMPLEX P  CADEN [employer] LILY. [id number] Right 1 Implanted   PIN BONE 3.2S199MQ - [id number]  PIN BONE [id number]  CADEN [employer]est[employer] LILY. [id number] Right 1 Implanted and Explanted   COMP FEM CRUCIATE LUIS DZ 5 RT - [id number]  COMP FEM CRUCIATE LUIS DZ 5 RT  CADEN SALES LILY. [id number] Right 1 Implanted   PATELLA TRIATHLON 31X9 SYMTRC - [id number]  PATELLA TRIATHLON 31X9 SYMTRC  [employer]EN [employer] LILY. [id number] Right 1 Implanted   BASEPLATE TIB LUIS PRIM SZ 6 - [id number]  BASEPLATE TIB LUIS PRIM SZ 6  [employer]EN [employer] LILY. [id number] Right 1 Implanted   INSERT CONVTNL POLYETH 9MM 6 - [id number]  INSERT CONVTNL POLYETH 9MM 6  [employer] LILY. [id number] Right 1 Implanted       Tourniquet time: 45min at 300mmHg    Specimens:   Specimen (24h ago, onward)      None                    Condition: Good    Disposition: PACU - hemodynamically stable.    Attestation: I was present and scrubbed for the entire procedure.    INDICATIONS FOR THE PROCEDURE: A 72M with a history of chronic   Right knee arthritis, had failed all conservative measures including cortisone    injections, PT, viscosupplementation and activity modification. After a long   discussion, the patient wished to proceed with the procedure above.     PROCEDURE IN DETAIL: Risks, benefits and alternatives of the procedure were   explained to the patient including, but not limited to damage to nerves,   arteries or blood vessels. Also explained risk of infection, stiffness, DVT, PE,   polyethylene wear as well as anesthetic complications including seizure, stroke,   heart attack and death, understood this and signed informed consent. The   patient's Right knee was marked prior to coming to the Operating Room. The patient was brought to the operating room, placed on the operating table in a supine position.A  formal timeout was done in which correct patient, procedure and op site were all   correctly identified and confirmed by the entire operating team.  900mg Clindamycin was given prior to surgical incision. Spinal anesthesia was induced. The patient'sRight  lower extremity was prepped and   draped in normal sterile fashion. TheRight  leg was exsanguinated with an   Esmarch. Tourniquet was inflated up 300 mmHg. Standard anterior approach to   the knee was made. Medial parapatellar arthrotomy was made, leaving about 1/8   inch of tissue for later repair. Proximal medial tibial release was performed,   making sure not to release any of the MCL. We then   everted the patella and reflexed the knee. Fat pad was excised as well as some   of the ACL. Soft tissue off the distal anterior femur was removed for   visualization, so as to help prevent notching.  We started off by placing our femoral pins.  Two pins were placed about 2 centimeters proximal and 1 centimeter anterior to the medial epicondyle.  We then measured 2 finger breaths below the tibial tubercle and identified the tibial crest.    Two pins were placed just short of bicortically in the tibia.  We then hooked up our arays to our pins.  We placed 2  checkpoints.  One in the tibia and 1 in the femur.  We then took the leg through range of motion.  We then began by marking off our bony points.  We did this 1st on the femur and then on the tibia.  After this we did ligament balancing of the knee 1st extension and then in flexion by using some spoons and an osteotome.  We then adjusted our bone cuts on the computer and once we liked our plan began by making our cuts.  The MirDeneg robotic assisted cutting arm was then brought in and then we made our femoral cuts and then turned our attention to the tibia.  Tibial cuts were made.  All of bone was removed as well as excess soft tissue.  Posterior osteophytes removed as well.  We then began to trial.  We placed a size 5 femur and a size 6 tibia with a size 9 polyethylene.  We were within 1 millimeter between our medial and lateral compartments in both flexion and extension.        We turned our attention to patella, caliper was used on the patella where it   measured 25. We set guide at 16 and made our 9-mm cut for polyethylene component, 31 was selected. Then drill holes were placed and the patellar button was placed.   This had good tracking. No need for a lateral release and with no hand tests,   it stayed centered the whole time. We then marked our tibial rotation. We then drilled our femoral lugs.  We then   removed everything except the size 6 tibial tray. We then checked our tibial tray   with a drop vivienne again and then marked our rotation and pinned it into place then   drilled, and then punched for our keel. Robot and pins were all removed. We then started preparing final   components on the back table. Anterior, medial and lateral structures were injected with our local   Cocktail. Bony surfaces   copiously irrigated with Pulsavac and then thoroughly dried. Once the cement   was the appropriate consistency, first the tibia and then the femur were   cemented into place, removing excess cement. A 9 trial was  placed. The knee   was held in compression and then the patella was placed. Cement was allowed to   cure. Once the cement was cured, we then removed excess cement. Again trialed   one final time, again seeing a 9. We then tapped into place the   final 9 meniscal bearing into place. After this was done, we then did our   diluted iodine soak for 3 minutes and then proceeded with closing.  Arthrotomy was closed using our StrataFix.   Subcutaneous tissue was closed using 2-0 Vicryl and skin was using a running 3-0   StrataFix and Dermabond.Instrument, sponge, and needle counts were correct prior to wound closure and at the conclusion of the case.  Sterile dressing was applied.   They were extubated, awakened and transferred from the Operating Room to the   Recovery Room in stable condition.

## 2023-08-08 NOTE — DISCHARGE SUMMARY
[facility]  Short Stay    Procedure(s) (LRB):  ROBOTIC ARTHROPLASTY, KNEE, TOTAL (Right)      OUTCOME: Patient tolerated treatment/procedure well without complication and is now ready for discharge.    DISPOSITION: Home or Self Care    FINAL DIAGNOSIS:  <principal problem not specified>    FOLLOWUP: In clinic    DISCHARGE INSTRUCTIONS:    Discharge Procedure Orders   MRSA Screen by PCR   Standing Status: Future Standing Exp. Date: 09/29/24     Diet general     Leave dressing on - Keep it clean, dry, and intact until clinic visit     Change dressing (specify)   Order Comments: Dressing change: If dressing loses its seal, change daily.     Call MD for:  temperature >100.4     Call MD for:  persistent nausea and vomiting     Call MD for:  severe uncontrolled pain     Call MD for:  difficulty breathing, headache or visual disturbances     Call MD for:  redness, tenderness, or signs of infection (pain, swelling, redness, odor or green/yellow discharge around incision site)     Call MD for:  hives     Call MD for:  persistent dizziness or light-headedness     Call MD for:  extreme fatigue     Ice to affected area   Order Comments: using barrier between ice and skin (specify duration&frequency)     Keep surgical extremity elevated     No driving, operating heavy equipment or signing legal documents while taking pain medication     Activity as tolerated     Weight bearing as tolerated     Shower on day dressing removed (No bath)        TIME SPENT ON DISCHARGE: 5 minutes

## 2023-08-08 NOTE — TRANSFER OF CARE
"Anesthesia Transfer of Care Note    Patient: Dayton Faye    Procedure(s) Performed: Procedure(s) (LRB):  ROBOTIC ARTHROPLASTY, KNEE, TOTAL (Right)    Patient location: PACU    Anesthesia Type: spinal    Transport from OR: Transported from OR on 2-3 L/min O2 by NC with adequate spontaneous ventilation    Post pain: adequate analgesia    Post assessment: no apparent anesthetic complications    Post vital signs: stable    Level of consciousness: awake, alert and oriented    Nausea/Vomiting: no nausea/vomiting    Complications: none    Transfer of care protocol was followed      Last vitals:   Visit Vitals  /65 (BP Location: Right arm, Patient Position: Lying)   Pulse (!) 59   Temp 36.7 °C (98.1 °F) (Skin)   Resp 10   Ht 5' 11" (1.803 m)   Wt 97.7 kg (215 lb 6.2 oz)   SpO2 100%   BMI 30.04 kg/m²     "

## 2023-08-08 NOTE — PATIENT INSTRUCTIONS
1.Diet: Ice chips, clear liquids, and then diet as tolerated. Drink plenty of liquids.  2.Ice the area at least three times a day (20 minutes per session).  3.Elevate the extremity above the level of the heart to help reduce swelling.  4.Pain medication can be taken every four to six hours as needed. It is helpful to take pain medication prior to physical therapy.  Use Tylenol or anti-inflammatories for pain as much as possible.  Pain medication is for pain not responsive to over-the-counter medications.  5.Any activity that requires precise thinking or accuracy should be avoided for a minimum of 72 hours after surgery and while on narcotic pain medication. This includes operating machinery and/or driving a vehicle.  6.All sutures/staples will be removed approximately 14 days from the time of surgery. Leave steri-strips (skin tapes) in place until sutures are removed.  7. If skin glue is used instead of stitches, do not apply ointments or solutions to the incision. Keep the incision dry. The skin glue will peel off in 3-4 weeks.  8. Change dressing on the first post-op day. Use gauze for the first 3 days, then start using Band-Aids over the incision sites.   9. All casts, splints, braces, slings, crutches, abduction pillows, etc... Are to be worn as instructed. Crutches are just to be used as needed. If not needed for soreness or balance, may weight bear as tolerated without the crutches.  10. Keep the incision dry for 10-14 days. A waterproof dressing (purchase at [employer], [employer], etc) can be used to shower. No bath, pool, hot tub until instructed.  11. Call [phone] with any questions or concerns.

## 2023-08-09 VITALS
BODY MASS INDEX: 30.15 KG/M2 | WEIGHT: 215.38 LBS | RESPIRATION RATE: 17 BRPM | HEART RATE: 69 BPM | HEIGHT: 71 IN | DIASTOLIC BLOOD PRESSURE: 67 MMHG | TEMPERATURE: 98 F | SYSTOLIC BLOOD PRESSURE: 110 MMHG | OXYGEN SATURATION: 99 %

## 2023-08-09 PROCEDURE — G0180 PR HOME HEALTH MD CERTIFICATION: ICD-10-PCS | Mod: ,,, | Performed by: ORTHOPAEDIC SURGERY

## 2023-08-09 PROCEDURE — G0180 MD CERTIFICATION HHA PATIENT: HCPCS | Mod: ,,, | Performed by: ORTHOPAEDIC SURGERY

## 2023-08-09 NOTE — PROGRESS NOTES
Very pleased with care given. All staff were very kind and supportive - very caring.  States he understands all discharge instructions.  PT coming this morning.

## 2023-08-15 ENCOUNTER — PATIENT MESSAGE (OUTPATIENT)
Dept: ADMINISTRATIVE | Facility: HOSPITAL | Age: 73
End: 2023-08-15
Payer: MEDICARE

## 2023-08-16 DIAGNOSIS — M17.11 PRIMARY OSTEOARTHRITIS OF RIGHT KNEE: ICD-10-CM

## 2023-08-16 RX ORDER — IBUPROFEN 600 MG/1
600 TABLET ORAL EVERY 6 HOURS PRN
Qty: 30 TABLET | Refills: 0 | Status: SHIPPED | OUTPATIENT
Start: 2023-08-16 | End: 2023-08-21 | Stop reason: SDUPTHER

## 2023-08-17 DIAGNOSIS — M17.11 PRIMARY OSTEOARTHRITIS OF RIGHT KNEE: Primary | ICD-10-CM

## 2023-08-21 ENCOUNTER — OFFICE VISIT (OUTPATIENT)
Dept: ORTHOPEDICS | Facility: CLINIC | Age: 73
End: 2023-08-21
Payer: MEDICARE

## 2023-08-21 ENCOUNTER — HOSPITAL ENCOUNTER (OUTPATIENT)
Dept: RADIOLOGY | Facility: HOSPITAL | Age: 73
Discharge: HOME | End: 2023-08-21
Attending: ORTHOPAEDIC SURGERY
Payer: MEDICARE

## 2023-08-21 VITALS — HEIGHT: 71 IN | RESPIRATION RATE: 18 BRPM | WEIGHT: 215 LBS | BODY MASS INDEX: 30.1 KG/M2

## 2023-08-21 DIAGNOSIS — Z96.651 STATUS POST TOTAL KNEE REPLACEMENT USING CEMENT, RIGHT: Primary | ICD-10-CM

## 2023-08-21 DIAGNOSIS — M17.11 PRIMARY OSTEOARTHRITIS OF RIGHT KNEE: ICD-10-CM

## 2023-08-21 PROCEDURE — 99214 OFFICE O/P EST MOD 30 MIN: CPT | Mod: PBBFAC,PO | Performed by: ORTHOPAEDIC SURGERY

## 2023-08-21 PROCEDURE — 73560 X-RAY EXAM OF KNEE 1 OR 2: CPT | Mod: TC,PO,RT

## 2023-08-21 PROCEDURE — 99999 PR PBB SHADOW E&M-EST. PATIENT-LVL IV: ICD-10-PCS | Mod: PBBFAC,,, | Performed by: ORTHOPAEDIC SURGERY

## 2023-08-21 PROCEDURE — 99999 PR PBB SHADOW E&M-EST. PATIENT-LVL IV: CPT | Mod: PBBFAC,,, | Performed by: ORTHOPAEDIC SURGERY

## 2023-08-21 PROCEDURE — 99024 PR POST-OP FOLLOW-UP VISIT: ICD-10-PCS | Mod: POP,,, | Performed by: ORTHOPAEDIC SURGERY

## 2023-08-21 PROCEDURE — 73560 X-RAY EXAM OF KNEE 1 OR 2: CPT | Mod: 26,RT,, | Performed by: RADIOLOGY

## 2023-08-21 PROCEDURE — 73560 XR KNEE 1 OR 2 VIEW RIGHT: ICD-10-PCS | Mod: 26,RT,, | Performed by: RADIOLOGY

## 2023-08-21 PROCEDURE — 99024 POSTOP FOLLOW-UP VISIT: CPT | Mod: POP,,, | Performed by: ORTHOPAEDIC SURGERY

## 2023-08-21 RX ORDER — IBUPROFEN 600 MG/1
600 TABLET ORAL EVERY 6 HOURS PRN
Qty: 30 TABLET | Refills: 0 | Status: SHIPPED | OUTPATIENT
Start: 2023-08-21 | End: 2023-09-01 | Stop reason: SDUPTHER

## 2023-08-21 NOTE — PROGRESS NOTES
Past Medical History:   Diagnosis Date    General anesthetics causing adverse effect in therapeutic use     takes longer to wake up after anesthesia    Hypertension     PONV (postoperative nausea and vomiting)     Pre-diabetes     Retinal detachment        Past Surgical History:   Procedure Laterality Date    BACK SURGERY      CATARACT EXTRACTION Bilateral     EYE SURGERY      HIP SURGERY Right     JOINT REPLACEMENT  2016    Right hip joint replaced    KNEE SURGERY Left     RETINAL DETACHMENT SURGERY Left     ROBOTIC ARTHROPLASTY, KNEE Right 8/8/2023    Procedure: ROBOTIC ARTHROPLASTY, KNEE, TOTAL;  Surgeon: Fili Scott MD;  Location: [facility];  Service: Orthopedics;  Laterality: Right;    SHOULDER SURGERY Right     SHOULDER SURGERY Left     SPINE SURGERY      lumbar fusion       Current Outpatient Medications   Medication Sig    acetaminophen (TYLENOL) 500 MG tablet Take 2 tablets (1,000 mg total) by mouth every 8 (eight) hours as needed for Pain.    aspirin (ECOTRIN) 81 MG EC tablet Take 1 tablet (81 mg total) by mouth 2 (two) times a day.    atorvastatin (LIPITOR) 20 MG tablet Take 1 tablet (20 mg total) by mouth once daily.    CLARITIN-D 24 HOUR  mg per 24 hr tablet Take 1 tablet by mouth.    coenzyme Q10 100 mg capsule Take 200 mg by mouth.    furosemide (LASIX) 40 MG tablet Take 1 tablet (40 mg total) by mouth daily as needed (swelling).    ginkgo biloba 120 mg Tab Take by mouth once daily.    ibuprofen (ADVIL,MOTRIN) 600 MG tablet Take 1 tablet (600 mg total) by mouth every 6 (six) hours as needed for Pain.    krill-om-3-dha-epa-phospho-ast 049-258-18-64 mg Cap Take by mouth once daily.    losartan (COZAAR) 100 MG tablet Take 1 tablet (100 mg total) by mouth once daily. (Patient taking differently: Take 100 mg by mouth every evening.)    mv-min-folic acid-lutein 500-250 mcg Chew Take by mouth.    naproxen sodium 220 mg Cap Take by mouth 2 (two) times daily as needed.    ondansetron (ZOFRAN)  "4 MG tablet Take 1 tablet (4 mg total) by mouth every 6 (six) hours as needed for Nausea.    oxyCODONE-acetaminophen (PERCOCET) 5-325 mg per tablet Take 1 tablet by mouth every 6 (six) hours as needed for Pain.    pantoprazole (PROTONIX) 40 MG tablet Take 1 tablet (40 mg total) by mouth once daily.    potassium chloride SA (K-DUR,KLOR-CON M) 10 MEQ tablet Take 10 mEq by mouth. PRN WITH LASIX    prednisoLONE acetate (PRED MILD) 0.12 % ophthalmic suspension Place 1 drop into the left eye every morning.    tiZANidine (ZANAFLEX) 4 MG tablet TAKE 1 TABLET(4 MG) BY MOUTH EVERY NIGHT AS NEEDED FOR MUSCLE SPASMS    vit C/E/Zn/coppr/lutein/zeaxan (PRESERVISION AREDS-2 ORAL) Take by mouth 2 (two) times a day.     No current facility-administered medications for this visit.     Facility-Administered Medications Ordered in Other Visits   Medication    electrolyte-S (ISOLYTE)    electrolyte-S (ISOLYTE)    fentaNYL 50 mcg/mL injection 25 mcg    fentaNYL 50 mcg/mL injection  mcg    LIDOcaine (PF) 10 mg/ml (1%) injection 10 mg    midazolam (VERSED) 1 mg/mL injection 2 mg    ondansetron injection 4 mg    oxyCODONE immediate release tablet 5 mg       Review of patient's allergies indicates:   Allergen Reactions    Adhesive Rash     Patients states "surgical tape."    Latex, natural rubber Rash       Family History   Problem Relation Age of Onset    Macular degeneration Father     Heart disease Father     Arthritis Father          at age 92    Hypertension Father     Vision loss Father     No Known Problems Sister     No Known Problems Brother     Cancer Paternal Grandmother         Lung    Cancer Paternal Grandfather         Prostate    Glaucoma Neg Hx        Social History     Socioeconomic History    Marital status:    Occupational History    Occupation: School psychologist     Comment: retired   Tobacco Use    Smoking status: Former     Current packs/day: 0.00     Average packs/day: 0.5 packs/day for 5.0 years " (2.5 ttl pk-yrs)     Types: Cigarettes, Pipe     Start date: 1986     Quit date: 1991     Years since quittin.6    Smokeless tobacco: Never   Substance and Sexual Activity    Alcohol use: Yes     Comment: Very occasionally    Drug use: Never    Sexual activity: Yes     Partners: Female     Birth control/protection: Post-menopausal   Social History Narrative    Lives with female partner.  Moved here from [place]. Two cats. Enjoys ancestry, reading.       Chief Complaint: No chief complaint on file.      Date of surgery:23    History of present illness:  72-year-old male underwent right robotic assisted total knee arthroplasty.  He is doing very well.  Walking without an assistive device.  Just has a little swelling.  Pain is minimal as a 1/10.      Review of Systems:    Musculoskeletal:  See HPI        Physical Examination:    Vital Signs:  There were no vitals filed for this visit.    There is no height or weight on file to calculate BMI.    This a well-developed, well nourished patient in no acute distress.  They are alert and oriented and cooperative to examination.  Pt. walks without an antalgic gait.      Examination right knee shows well-healing surgical incision .  No erythema drainage.  Mild swelling in his leg.  No calf pain.  Range of motion about 0-95 degrees.    X-rays:  Two views of the right knee are ordered and reviewed which show well-aligned total knee arthroplasty components without complication     Assessment::  Status post right robotic assisted total knee arthroplasty using cement    Plan:  I refilled the ibuprofen.  Patient may get the incision wet.  Patient may start to try and drive in about a week.  We will transition outpatient physical therapy.  I will see him in a month.    This note was created using [employer] voice recognition software that occasionally misinterpreted phrases or words.

## 2023-08-22 ENCOUNTER — PATIENT MESSAGE (OUTPATIENT)
Dept: ADMINISTRATIVE | Facility: OTHER | Age: 73
End: 2023-08-22
Payer: MEDICARE

## 2023-08-23 ENCOUNTER — PATIENT MESSAGE (OUTPATIENT)
Dept: ADMINISTRATIVE | Facility: HOSPITAL | Age: 73
End: 2023-08-23
Payer: MEDICARE

## 2023-08-23 ENCOUNTER — PATIENT OUTREACH (OUTPATIENT)
Dept: ADMINISTRATIVE | Facility: HOSPITAL | Age: 73
End: 2023-08-23
Payer: MEDICARE

## 2023-08-23 NOTE — PROGRESS NOTES
Population Health Chart Review & Patient Outreach Details:     Reason for Outreach Encounter:     [x]  Non-Compliant Report   []  Payor Report ([employer], PHN, BCBS, MSSP, MCIP, C, etc.)   []  Pre-Visit Chart Review     Updates Requested / Reviewed:     []  Care Everywhere    []     []  External Sources ([facility], [facility], DIS, etc.)   []  Care Team Updated    Patient Outreach Method:    []  Telephone Outreach Completed   [] Successful   [] Left Voicemail   [] Unable to Contact (wrong number, no voicemail)  [x]  [facility] Outreach Sent  []  Letter Outreach Mailed  []  Fax Sent for External Records  []  External Records Upload    Health Maintenance Topics Addressed and Outreach Outcomes / Actions Taken:        []      Breast Cancer Screening []  Mammo Scheduled      []  External Records Requested     []  Added Reminder to Complete to Upcoming Primary Care Appt Notes     []  Patient Declined     []  Patient Will Call Back to Schedule     []  Patient Will Schedule with External Provider / Order Routed if Applicable             []       Cervical Cancer Screening []  Pap Scheduled      []  External Records Requested     []  Added Reminder to Complete to Upcoming Primary Care Appt Notes     []  Patient Declined     []  Patient Will Call Back to Schedule     []  Patient Will Schedule with External Provider               [x]          Colorectal Cancer Screening []  Colonoscopy Case Request or Referral Placed     []  External Records Requested     []  Added Reminder to Complete to Upcoming Primary Care Appt Notes     []  Patient Declined     []  Patient Will Call Back to Schedule     []  Patient Will Schedule with External Provider     []  Fit Kit Mailed (add the SmartPhrase under additional notes)     []  Reminded Patient to Complete Home Test             []      Diabetic Eye Exam []  Eye Camera Scheduled or Optometry Referral Placed     []  External Records Requested     []  Added Reminder to Complete to  Upcoming Primary Care Appt Notes     []  Patient Declined     []  Patient Will Call Back to Schedule     []  Patient Will Schedule with External Provider             []      Blood Pressure Control []  Primary Care Follow Up Visit Scheduled     []  Remote Blood Pressure Reading Captured     []  Added Reminder to Complete to Upcoming Primary Care Appt Notes     []  Patient Declined     []  Patient Will Call Back / Patient Will Send Portal Message with Reading     []  Patient Will Call Back to Schedule Provider Visit             []       HbA1c & Other Labs []  Lab Appt Scheduled for Due Labs     []  Primary Care Follow Up Visit Scheduled      []  Reminded Patient to Complete Home Test     []  Added Reminder to Complete to Upcoming Primary Care Appt Notes     []  Patient Declined     []  Patient Will Call Back to Schedule     []  Patient Will Schedule with External Provider / Order Routed if Applicable           []    Schedule Primary Care Appt []  Primary Care Appt Scheduled     []  Patient Declined     []  Patient Will Call Back to Schedule     []  Pt Established with External Provider & Updated Care Team             []      Medication Adherence []  Primary Care Appointment Scheduled     []  Added Reminder to Upcoming Primary Care Appt Notes     []  Patient Reminded to  Prescription     []  Patient Declined, Provider Notified if Needed     []  Sent Provider Message to Review and/or Add Exclusion to Problem List             []      Osteoporosis Screening []  DXA Appointment Scheduled     []  External Records Requested     []  Added Reminder to Complete to Upcoming Primary Care Appt Notes     []  Patient Declined     []  Patient Will Call Back to Schedule     []  Patient Will Schedule with External Provider / Order Routed if Applicable     Additional Care Coordinator Notes:         Further Action Needed If Patient Returns Outreach:

## 2023-08-25 ENCOUNTER — CLINICAL SUPPORT (OUTPATIENT)
Dept: REHABILITATION | Facility: HOSPITAL | Age: 73
End: 2023-08-25
Payer: MEDICARE

## 2023-08-25 DIAGNOSIS — R29.898 WEAKNESS OF BOTH LOWER EXTREMITIES: ICD-10-CM

## 2023-08-25 DIAGNOSIS — Z96.651 STATUS POST TOTAL KNEE REPLACEMENT USING CEMENT, RIGHT: ICD-10-CM

## 2023-08-25 DIAGNOSIS — M25.661 IMPAIRED RANGE OF MOTION OF RIGHT KNEE: ICD-10-CM

## 2023-08-25 DIAGNOSIS — R26.89 IMPAIRED GAIT AND MOBILITY: ICD-10-CM

## 2023-08-25 PROCEDURE — 97162 PT EVAL MOD COMPLEX 30 MIN: CPT | Mod: PO | Performed by: PHYSICAL THERAPIST

## 2023-08-25 PROCEDURE — 97112 NEUROMUSCULAR REEDUCATION: CPT | Mod: PO | Performed by: PHYSICAL THERAPIST

## 2023-08-28 ENCOUNTER — CLINICAL SUPPORT (OUTPATIENT)
Dept: REHABILITATION | Facility: HOSPITAL | Age: 73
End: 2023-08-28
Payer: MEDICARE

## 2023-08-28 DIAGNOSIS — R26.89 IMPAIRED GAIT AND MOBILITY: ICD-10-CM

## 2023-08-28 DIAGNOSIS — R29.898 WEAKNESS OF BOTH LOWER EXTREMITIES: ICD-10-CM

## 2023-08-28 DIAGNOSIS — M25.661 IMPAIRED RANGE OF MOTION OF RIGHT KNEE: Primary | ICD-10-CM

## 2023-08-28 PROCEDURE — 97112 NEUROMUSCULAR REEDUCATION: CPT | Mod: PO,CQ

## 2023-08-28 PROCEDURE — 97110 THERAPEUTIC EXERCISES: CPT | Mod: PO,CQ

## 2023-08-28 NOTE — PLAN OF CARE
[facility]   Physical Therapy Initial Evaluation      Name: Dayton Faye  [facility] Number: [id number]    Therapy Diagnosis:   Encounter Diagnoses   Name Primary?    Status post total knee replacement using cement, right     Impaired range of motion of right knee     Weakness of both lower extremities     Impaired gait and mobility         Physician: Fili Scott,*    Physician Orders: PT Eval and Treat   Post Surgical? Yes Eval and Treat Yes Type of Therapy Outpatient Therapy   Medical Diagnosis from Referral: Z96.651 (ICD-10-CM) - Status post total knee replacement using cement, right   Evaluation Date: 8/25/2023  Authorization Period Expiration: 8/25/2024  Plan of Care Expiration: 10/20/2023  Progress Note Due: 9/24/2023  Visit # / Visits authorized: 1/ 1   FOTO: 1/ 3    Precautions: Standard, Diabetes, and HTN     Time In: 1000  Time Out: 1100  Total Billable Time: 50 minutes    Subjective     Date of onset: 8/8/2023    History of current condition - Clinton reports:  72-year-old male underwent right robotic assisted total knee arthroplasty.  He is doing very well.  Walking without an assistive device.  Just has a little swelling.  Pain is minimal as a 1/10.     Falls: 0    Imaging: X-Ray Knee 1 or 2 View Right  Order: [id number]  Status: Final result     Visible to patient: Yes (seen)     Next appt: 08/30/2023 at 01:45 PM in Outpatient Rehab (Lourdes Durham PTA)     Dx: Primary osteoarthritis of right knee     0 Result Notes  Details    Reading Physician Reading Date Result Priority   Migue Yu MD  [phone]  [phone] 8/21/2023 Routine     Narrative & Impression  EXAMINATION:  XR KNEE 1 OR 2 VIEW RIGHT     CLINICAL HISTORY:  Unilateral primary osteoarthritis, right knee     TECHNIQUE:  AP and lateral views of the right knee were performed.     COMPARISON:  08/08/2023     FINDINGS:  There has been a cemented tricompartmental right knee arthroplasty.  All  hardware components are well seated.  There is no fracture.  No significant joint effusion.  Moderate soft tissue swelling about the knee is present.     Impression:     Status post right knee arthroplasty without complication.        Electronically signed by: Migue Yu MD  Date:                                            08/21/2023  Time:                                           14:28           Exam Ended: 08/21/23 13:46 Last Resulted: 08/21/23 14:28           Prior Therapy: 2 weeks HH  Social History:  na  Occupation: retired  Prior Level of Function: walking with cane d/t pain  Current Level of Function: impaired walking, squatting, activity tolerance    Pain:  Current pt did not appear in unusual pain  Location: right knee    Description: Aching and Dull  Aggravating Factors: Sitting, Standing, Bending, and Walking  Easing Factors: ice and rest    Patients goals: to improve pain and activities     Medical History:   Past Medical History:   Diagnosis Date    General anesthetics causing adverse effect in therapeutic use     takes longer to wake up after anesthesia    Hypertension     PONV (postoperative nausea and vomiting)     Pre-diabetes     Retinal detachment        Surgical History:   Dayton Faye  has a past surgical history that includes Spine surgery; Knee surgery (Left); Hip surgery (Right); Shoulder surgery (Right); Shoulder surgery (Left); Retinal detachment surgery (Left); Joint replacement (2016); Cataract extraction (Bilateral); Back surgery; Eye surgery; and robotic arthroplasty, knee (Right, 8/8/2023).    Medications:   Dayton has a current medication list which includes the following prescription(s): acetaminophen, aspirin, atorvastatin, claritin-d 24 hour, coenzyme q10, furosemide, ginkgo biloba, ibuprofen, krill-om-3-dha-epa-phospho-ast, losartan, mv-min-folic acid-lutein, oxycodone-acetaminophen, pantoprazole, potassium chloride sa, prednisolone acetate, tizanidine, and vit  "c/e/zn/coppr/lutein/zeaxan, and the following Facility-Administered Medications: electrolyte-s (ph 7.4), electrolyte-s (ph 7.4), fentanyl, fentanyl, lidocaine (pf) 10 mg/ml (1%), midazolam, ondansetron, and oxycodone.    Allergies:   Review of patient's allergies indicates:   Allergen Reactions    Adhesive Rash     Patients states "surgical tape."    Latex, natural rubber Rash        Objective      Observation: Pt is ambulating well with Hurrycane and proper gt pattern. He does have edema and expected ROM limitations s/p.    Posture: fair    Range of Motion:   Knee Left active Left Passive Right Active R passive   Flexion 120 Nt d/t 120 102 105   Extension 0 NT d/t 0 -7 -2     Lower Extremity Strength  Right LE  Left LE    Knee extension: Deferred s/p right TKA 8/8/2023 Knee extension: 4+/5   Knee flexion: Deferred s/p right TKA 8/8/2023 Knee flexion: 4/5   Hip flexion: 4-/5 Hip flexion: 4/5   Hip extension:  3+/5 Hip extension: 4/5   Hip abduction: 3-/5 Hip abduction: 4-/5   Hip adduction: 3+/5 Hip adduction 4/5   Ankle dorsiflexion: 5/5 Ankle dorsiflexion: 4+/5   Ankle plantarflexion: 5/5 Ankle plantarflexion: 4-/5 (hx foot problem and calf atrophy noted     Special Tests:  Deferred s/p right TKA 8/8/2023    Step down test: Deferred s/p right TKA 8/8/2023    Function:    - SLS R: nt  - SLS L: nt  - Squat: nt   - Sit <--> Stand: modified s/p right TKA 8/8/2023 with edema and limited ROM/strength  - Bed Mobility: modified (I) and rough, may need assistance    Joint Mobility: hypomobile in all directions due to edema ( right Patellar)    Palpation: general soreness s/p right TKA 8/8/2023    Sensation: decreased Left lower lateral knee and patella s/p nerve block     Edema: moderate    Girth Measurement Joint line 5 cm below 10 cm above   Left 43 cm  35.5 cm 46 cm   Right 47 cm 42.5 cm 50.5 cm        Intake Outcome Measure for FOTO KNEE Survey    Therapist reviewed FOTO scores for Dayton Faye on 8/25/2023.   FOTO report " - see Media section or FOTO account episode details.    Intake Score: 40  Goal: 63         Treatment     Total Treatment time (time-based codes) separate from Evaluation: 23 minutes     Santino received the treatments listed below:      therapeutic exercises to develop strength, endurance, ROM, and flexibility for 5 minutes including:  STATIC EXTENSION STRETCH ON STOOL IN SITTING X 5 MINS, 5 #    neuromuscular re-education activities to improve: Sense and Proprioception for 15 minutes. The following activities were included:  LONG SITTING QS + SELF OVER PRESSURE ON STOOL 10 X 10 S  LONG SITTING QS + SLR ON LOW STOOL 3 X 10, 3 S HOLDS    Patient Education and Home Exercises     Education provided:   - HEP  - Pt/family was provided educational information, including: role of PT, role of pt/caregiver, goals for PT, POC, scheduling, and attendance policy.- pt verbalized understanding.     Written Home Exercises Provided: yes. Exercises were reviewed and Santino was able to demonstrate them prior to the end of the session.  Santino demonstrated good  understanding of the education provided. See EMR under Patient Instructions for exercises provided during therapy sessions.    Assessment     Dayton is a 72 y.o. male referred to outpatient Physical Therapy with a medical diagnosis of Status post total knee replacement using cement. Patient presents with impaired right knee ROM, LE weakness, gt, mobility, pn, and decreased QOL. Pt was performing bed mobility during assessment and felt a pull using right knee to roll. Inspected and pt was able to leave without decreased gt quality and palpation was negative. Pt may need assistance with bed mobility and will monitor this. Pn was at med right hamstring.    Patient prognosis is Good.   Patient will benefit from skilled outpatient Physical Therapy to address the deficits stated above and in the chart below, provide patient /family education, and to maximize patientt's level of  independence.     Plan of care discussed with patient: Yes  Patient's spiritual, cultural and educational needs considered and patient is agreeable to the plan of care and goals as stated below:     Anticipated Barriers for therapy: age, atrophy of Left calf and Left ankle weakness    Medical Necessity is demonstrated by the following  History  Co-morbidities and personal factors that may impact the plan of care [] LOW: no personal factors / co-morbidities  [] MODERATE: 1-2 personal factors / co-morbidities  [x] HIGH: 3+ personal factors / co-morbidities    Moderate / High Support Documentation:   Co-morbidities affecting plan of care:     Past Medical History:   Diagnosis Date    General anesthetics causing adverse effect in therapeutic use     takes longer to wake up after anesthesia    Hypertension     PONV (postoperative nausea and vomiting)     Pre-diabetes     Retinal detachment      Personal Factors:   age  coping style  lifestyle     Examination  Body Structures and Functions, activity limitations and participation restrictions that may impact the plan of care [] LOW: addressing 1-2 elements  [] MODERATE: 3+ elements  [] HIGH: 4+ elements (please support below)    Moderate / High Support Documentation: impaired knee ROM and strength, impaired QOL, presence of pain, gt, and fx     Clinical Presentation [] LOW: stable  [x] MODERATE: Evolving  [] HIGH: Unstable     Decision Making/ Complexity Score: moderate       Goals:  Short Term Goals: 4 weeks   -right knee AROM 0-120 deg for improving function.  -right hip and knee grossly 4-/5 for improving fx.  -Pt (I) in bed mobility without difficulty.  -Dec edema at knee by 1 cm to assist with strength and ROM.  -FOTO impairment score 50 for improving fx.  -Pt will have normal gt.  -SLR without knee lag in extension.    Long Term Goals: 8 weeks   -LE strength as tested grossly 4/5 or greater.  -FOTO impairment score 50 for improving fx.  -Pt will be (I) in maintenance  program.    Plan     Plan of care Certification: 8/25/2023 to 10/20/2023.    Outpatient Physical Therapy 2 times weekly for 8 weeks to include the following interventions: Aquatic Therapy, Electrical Stimulation IFC, Gait Training, Manual Therapy, Moist Heat/ Ice, Neuromuscular Re-ed, Patient Education, Self Care, Therapeutic Activities, Therapeutic Exercise, and Ultrasound.     Umu Diallo, PT        Physician's Signature: _________________________________________ Date: ________________

## 2023-08-28 NOTE — PROGRESS NOTES
[facility]   Physical Therapy Treatment Note     Name: Dayton Faye  Clinic Number: [id number]    Therapy Diagnosis:   Encounter Diagnoses   Name Primary?    Impaired range of motion of right knee Yes    Weakness of both lower extremities     Impaired gait and mobility      Physician: Issa Merida, VIN    Visit Date: 8/28/2023    Physician: Fili Scott,*     Physician Orders: PT Eval and Treat   Post Surgical? Yes Eval and Treat Yes Type of Therapy Outpatient Therapy   Medical Diagnosis from Referral: Z96.651 (ICD-10-CM) - Status post total knee replacement using cement, right   Evaluation Date: 8/25/2023  Authorization Period Expiration: 8/25/2024  Plan of Care Expiration: 10/20/2023  Progress Note Due: 9/24/2023  Visit # / Visits authorized: 1/ 1   FOTO: 1/ 3     Precautions: Standard, Diabetes, and HTN       Time In: 1:45  Time Out: 2:30   Total Billable Time: 45 minutes  SUBJECTIVE     Pt reports: minimal knee pain with and putting all weight on when walking.  He was compliant with home exercise program.  Response to previous treatment: No change   Functional change: None    Pain: 2/10  Location: right below knee      OBJECTIVE     Objective Measures updated at progress report unless specified.     Knee Left active Left Passive Right Active R passive   Flexion 120 Nt d/t 120 102 105   Extension 0 NT d/t 0 -7 -2      TREATMENT     De received the treatments listed below:        therapeutic activities to improve functional performance for 00  minutes, including:    received therapeutic exercises to develop strength and ROM for 10  minutes including:  STATIC EXTENSION STRETCH ON STOOL IN SITTING X 5 MINS, 5 #  Heel Slides  3x 10     neuromuscular re-education activities to improve: Sense and Proprioception for 35 minutes. The following activities were included:  LONG SITTING QS + SELF OVER PRESSURE ON STOOL 10 X 10 S  LONG SITTING QS + SLR ON LOW STOOL 3 X 10, 3 S  HOLDS  TKE 2 x 10 5sec hold   Amb in clinic with SC around clinic with vc for heel to toe gait tech  received the following manual therapy techniques: Soft tissue Mobilization were applied to the:  for 00 minutes, including:      PATIENT EDUCATION AND HOME EXERCISES     Home Exercises Provided and Patient Education Provided     Education provided:   - Educated pt on the importance of daily stretch to increase the benefit of therapy and positive results.      Written Home Exercises Provided: Patient instructed to cont prior HEP. Exercises were reviewed and Santino was able to demonstrate them prior to the end of the session.  Santino demonstrated good  understanding of the education provided. See EMR under Patient Instructions for exercises provided during therapy sessions    ASSESSMENT   Pt tolerated session well with minimal c/o pain knee pain with extension stretch. Pt maintained extension with LAQ and heel strike with gait. Ext and flexion doing well at this time. Conts to have weakness with quad and cont requiring the need cane for balance.     Santino Is progressing well towards his goals.   Pt prognosis is Good.     Pt will continue to benefit from skilled outpatient physical therapy to address the deficits listed in the problem list box on initial evaluation, provide pt/family education and to maximize pt's level of independence in the home and community environment.     Pt's spiritual, cultural and educational needs considered and pt agreeable to plan of care and goals.     Anticipated barriers to physical therapy: none    Goals:  Short Term Goals: 4 weeks   -right knee AROM 0-120 deg for improving function.  -right hip and knee grossly 4-/5 for improving fx.  -Pt (I) in bed mobility without difficulty.  -Dec edema at knee by 1 cm to assist with strength and ROM.  -FOTO impairment score 50 for improving fx.  -Pt will have normal gt.  -SLR without knee lag in extension.     Long Term Goals: 8 weeks   -LE  strength as tested grossly 4/5 or greater.  -FOTO impairment score 50 for improving fx.  -Pt will be (I) in maintenance program.     PLAN     Plan of care Certification: 8/25/2023 to 10/20/2023.     Outpatient Physical Therapy 2 times weekly for 8 weeks to include the following interventions: Aquatic Therapy, Electrical Stimulation IFC, Gait Training, Manual Therapy, Moist Heat/ Ice, Neuromuscular Re-ed, Patient Education, Self Care, Therapeutic Activities, Therapeutic Exercise, and Ultrasound.        Lourdes Durham, PTA

## 2023-08-29 ENCOUNTER — EXTERNAL HOME HEALTH (OUTPATIENT)
Dept: HOME HEALTH SERVICES | Facility: HOSPITAL | Age: 73
End: 2023-08-29
Payer: MEDICARE

## 2023-08-30 ENCOUNTER — CLINICAL SUPPORT (OUTPATIENT)
Dept: REHABILITATION | Facility: HOSPITAL | Age: 73
End: 2023-08-30
Payer: MEDICARE

## 2023-08-30 DIAGNOSIS — R26.89 IMPAIRED GAIT AND MOBILITY: ICD-10-CM

## 2023-08-30 DIAGNOSIS — M25.661 IMPAIRED RANGE OF MOTION OF RIGHT KNEE: Primary | ICD-10-CM

## 2023-08-30 DIAGNOSIS — R29.898 WEAKNESS OF BOTH LOWER EXTREMITIES: ICD-10-CM

## 2023-08-30 PROCEDURE — 97112 NEUROMUSCULAR REEDUCATION: CPT | Mod: PO,CQ

## 2023-08-30 PROCEDURE — 97110 THERAPEUTIC EXERCISES: CPT | Mod: PO,CQ

## 2023-08-30 NOTE — PROGRESS NOTES
[facility]   Physical Therapy Treatment Note     Name: Dayton Faye  Clinic Number: [id number]    Therapy Diagnosis:   Encounter Diagnoses   Name Primary?    Impaired range of motion of right knee Yes    Weakness of both lower extremities     Impaired gait and mobility        Physician: Issa Merida, VIN    Visit Date: 8/30/2023    Physician: Fili Scott,*     Physician Orders: PT Eval and Treat   Post Surgical? Yes Eval and Treat Yes Type of Therapy Outpatient Therapy   Medical Diagnosis from Referral: Z96.651 (ICD-10-CM) - Status post total knee replacement using cement, right   Evaluation Date: 8/25/2023  Authorization Period Expiration: 8/25/2024  Plan of Care Expiration: 10/20/2023  Progress Note Due: 9/24/2023  Visit # / Visits authorized: 2/ 1   FOTO: 1/ 3     Precautions: Standard, Diabetes, and HTN       Time In: 1:45  Time Out: 2:30   Total Billable Time: 45 minutes  SUBJECTIVE     Pt reports: minimal knee pain and stiffness. He is continue to amb with cane.   He was compliant with home exercise program.  Response to previous treatment: No change   Functional change: None    Pain: 2/10  Location: right below knee      OBJECTIVE     Objective Measures updated at progress report unless specified.     Knee Left active Left Passive Right Active R passive   Flexion 120 Nt d/t 120 102 105   Extension 0 NT d/t 0 -7 -2      TREATMENT     De received the treatments listed below:        therapeutic activities to improve functional performance for 00  minutes, including:  Sit<>stand 10x    received therapeutic exercises to develop strength and ROM for 15 minutes including:  Calf stretch on wedge 2x 30sec  STATIC EXTENSION STRETCH ON STOOL IN SITTING X 5 MINS, 5 #  Heel Slides  20x  Heel Raise 10x     neuromuscular re-education activities to improve: Sense and Proprioception for 30 minutes. The following activities were included:  LONG SITTING QS + SELF OVER PRESSURE ON  STOOL 10 X 10 S  LONG SITTING QS + SLR ON LOW STOOL 3 X 10, 3 S HOLDS  SLR 2 x 10  LAQ  2 x 10 5sec hold 1#  TKE standing w/ G TB 2 x 10  SLS R LE 3x 30sec    received the following manual therapy techniques: Soft tissue Mobilization were applied to the:  for 00 minutes, including:      PATIENT EDUCATION AND HOME EXERCISES     Home Exercises Provided and Patient Education Provided     Education provided:   - Educated pt on the importance of daily stretch to increase the benefit of therapy and positive results.      Written Home Exercises Provided: Patient instructed to cont prior HEP. Exercises were reviewed and Santino was able to demonstrate them prior to the end of the session.  Santino demonstrated good  understanding of the education provided. See EMR under Patient Instructions for exercises provided during therapy sessions    ASSESSMENT   Pt tolerated session well with minimal c/o pain knee pain with extension stretch. Pt progressed to standing SLB  with minimal c/o fatigue. Cont to progress pt to increase quad strength and improve balance.     Santino Is progressing well towards his goals.   Pt prognosis is Good.     Pt will continue to benefit from skilled outpatient physical therapy to address the deficits listed in the problem list box on initial evaluation, provide pt/family education and to maximize pt's level of independence in the home and community environment.     Pt's spiritual, cultural and educational needs considered and pt agreeable to plan of care and goals.     Anticipated barriers to physical therapy: none    Goals:  Short Term Goals: 4 weeks   -right knee AROM 0-120 deg for improving function.  -right hip and knee grossly 4-/5 for improving fx.  -Pt (I) in bed mobility without difficulty.  -Dec edema at knee by 1 cm to assist with strength and ROM.  -FOTO impairment score 50 for improving fx.  -Pt will have normal gt.  -SLR without knee lag in extension.     Long Term Goals: 8 weeks   -LE  strength as tested grossly 4/5 or greater.  -FOTO impairment score 50 for improving fx.  -Pt will be (I) in maintenance program.     PLAN     Plan of care Certification: 8/25/2023 to 10/20/2023.     Outpatient Physical Therapy 2 times weekly for 8 weeks to include the following interventions: Aquatic Therapy, Electrical Stimulation IFC, Gait Training, Manual Therapy, Moist Heat/ Ice, Neuromuscular Re-ed, Patient Education, Self Care, Therapeutic Activities, Therapeutic Exercise, and Ultrasound.        Lourdes Durham, PTA

## 2023-09-01 DIAGNOSIS — M17.11 PRIMARY OSTEOARTHRITIS OF RIGHT KNEE: ICD-10-CM

## 2023-09-02 RX ORDER — ASPIRIN 81 MG/1
81 TABLET ORAL 2 TIMES DAILY
Qty: 56 TABLET | Refills: 0 | Status: SHIPPED | OUTPATIENT
Start: 2023-09-02 | End: 2023-11-08

## 2023-09-02 RX ORDER — IBUPROFEN 600 MG/1
600 TABLET ORAL EVERY 6 HOURS PRN
Qty: 30 TABLET | Refills: 0 | Status: SHIPPED | OUTPATIENT
Start: 2023-09-02 | End: 2023-09-20 | Stop reason: SDUPTHER

## 2023-09-05 ENCOUNTER — CLINICAL SUPPORT (OUTPATIENT)
Dept: REHABILITATION | Facility: HOSPITAL | Age: 73
End: 2023-09-05
Payer: MEDICARE

## 2023-09-05 DIAGNOSIS — M25.661 IMPAIRED RANGE OF MOTION OF RIGHT KNEE: Primary | ICD-10-CM

## 2023-09-05 DIAGNOSIS — R26.89 IMPAIRED GAIT AND MOBILITY: ICD-10-CM

## 2023-09-05 DIAGNOSIS — R29.898 WEAKNESS OF BOTH LOWER EXTREMITIES: ICD-10-CM

## 2023-09-05 PROCEDURE — 97110 THERAPEUTIC EXERCISES: CPT | Mod: PO,CQ

## 2023-09-05 PROCEDURE — 97112 NEUROMUSCULAR REEDUCATION: CPT | Mod: PO,CQ

## 2023-09-05 NOTE — PROGRESS NOTES
[facility]   Physical Therapy Treatment Note     Name: Dayton Faye  Clinic Number: [id number]    Therapy Diagnosis:   Encounter Diagnoses   Name Primary?    Impaired range of motion of right knee Yes    Weakness of both lower extremities     Impaired gait and mobility        Physician: Issa Merida, VIN    Visit Date: 9/5/2023    Physician: Fili Scott,*     Physician Orders: PT Eval and Treat   Post Surgical? Yes Eval and Treat Yes Type of Therapy Outpatient Therapy   Medical Diagnosis from Referral: Z96.651 (ICD-10-CM) - Status post total knee replacement using cement, right   Evaluation Date: 8/25/2023  Authorization Period Expiration: 8/25/2024  Plan of Care Expiration: 10/20/2023  Progress Note Due: 9/24/2023  Visit # / Visits authorized: 2/ 1   FOTO: 1/ 3     Precautions: Standard, Diabetes, and HTN       Time In: 1:45  Time Out: 2:30   Total Billable Time: 45 minutes  SUBJECTIVE     Pt reports: that he is w/o c/o knee pn at this time.   He was compliant with home exercise program.  Response to previous treatment: No change   Functional change: None    Pain: 0/10  Location: right below knee      OBJECTIVE     Objective Measures updated at progress report unless specified.     Knee Left active Left Passive Right Active R passive   Flexion 120 Nt d/t 120 102 105   Extension 0 NT d/t 0 -7 -2      TREATMENT     De received the treatments listed below:        therapeutic activities to improve functional performance for 00  minutes, including:  Sit<>stand 10x    received therapeutic exercises to develop strength and ROM for 30 minutes including:  Stat bike seat 14 5 min  Calf stretch on wedge 2x 30sec  STATIC EXTENSION STRETCH ON STOOL IN SITTING X 5 MINS, 5 # NP  Heel Slides  20x  Heel Raise 20x   Mini Squats 20x   Standing hip ABD 20x  Standing hip ext 20x  Standing HS Curl 20x    neuromuscular re-education activities to improve: Sense and Proprioception for 15  minutes. The following activities were included:  LONG SITTING QS + SELF OVER PRESSURE ON STOOL 10 X 10 S NP  LONG SITTING QS   3 X 10, 5 S HOLDS  SLR with QS 2 x 10 (quad activation)  LAQ  2 x 10 5sec hold 1#  TKE standing w/ G TB 2 x 10  SLS R LE 3x 30sec    received the following manual therapy techniques: Soft tissue Mobilization were applied to the:  for 00 minutes, including:      PATIENT EDUCATION AND HOME EXERCISES     Home Exercises Provided and Patient Education Provided     Education provided:   - Educated pt on the importance of daily stretch to increase the benefit of therapy and positive results.      Written Home Exercises Provided: Patient instructed to cont prior HEP. Exercises were reviewed and Santino was able to demonstrate them prior to the end of the session.  Santino demonstrated good  understanding of the education provided. See EMR under Patient Instructions for exercises provided during therapy sessions    ASSESSMENT   Pt tolerated today's tx with progression of ther ex and neuromuscular re-ed well with some evidence of fatigue. He was able to add new exs for strengthening and ROM without difficulty or complaint..  Cont to progress pt to increase quad strength, RPM and improve balance.     Santino Is progressing well towards his goals.   Pt prognosis is Good.     Pt will continue to benefit from skilled outpatient physical therapy to address the deficits listed in the problem list box on initial evaluation, provide pt/family education and to maximize pt's level of independence in the home and community environment.     Pt's spiritual, cultural and educational needs considered and pt agreeable to plan of care and goals.     Anticipated barriers to physical therapy: none    Goals:  Short Term Goals: 4 weeks   -right knee AROM 0-120 deg for improving function.  -right hip and knee grossly 4-/5 for improving fx.  -Pt (I) in bed mobility without difficulty.  -Dec edema at knee by 1 cm to assist  with strength and ROM.  -FOTO impairment score 50 for improving fx.  -Pt will have normal gt.  -SLR without knee lag in extension.     Long Term Goals: 8 weeks   -LE strength as tested grossly 4/5 or greater.  -FOTO impairment score 50 for improving fx.  -Pt will be (I) in maintenance program.     PLAN     Plan of care Certification: 8/25/2023 to 10/20/2023.     Outpatient Physical Therapy 2 times weekly for 8 weeks to include the following interventions: Aquatic Therapy, Electrical Stimulation IFC, Gait Training, Manual Therapy, Moist Heat/ Ice, Neuromuscular Re-ed, Patient Education, Self Care, Therapeutic Activities, Therapeutic Exercise, and Ultrasound.        Cesar Arredondo, PTA

## 2023-09-05 NOTE — PROGRESS NOTES
[facility]   Physical Therapy Treatment Note     Name: Dayton Faye  Clinic Number: [id number]    Therapy Diagnosis:   Encounter Diagnoses   Name Primary?    Impaired range of motion of right knee Yes    Weakness of both lower extremities     Impaired gait and mobility        Physician: Issa Merida, VIN    Visit Date: 9/5/2023    Physician: Fili Scott,*     Physician Orders: PT Eval and Treat   Post Surgical? Yes Eval and Treat Yes Type of Therapy Outpatient Therapy   Medical Diagnosis from Referral: Z96.651 (ICD-10-CM) - Status post total knee replacement using cement, right   Evaluation Date: 8/25/2023  Authorization Period Expiration: 8/25/2024  Plan of Care Expiration: 10/20/2023  Progress Note Due: 9/24/2023  Visit # / Visits authorized: 2/ 1   FOTO: 1/ 3     Precautions: Standard, Diabetes, and HTN       Time In: 1:45  Time Out: 2:30   Total Billable Time: 45 minutes  SUBJECTIVE     Pt reports: minimal knee pain and stiffness. He is continue to amb with cane.   He was compliant with home exercise program.  Response to previous treatment: No change   Functional change: None    Pain: 2/10  Location: right below knee      OBJECTIVE     Objective Measures updated at progress report unless specified.     Knee Left active Left Passive Right Active R passive   Flexion 120 Nt d/t 120 102 105   Extension 0 NT d/t 0 -7 -2      TREATMENT     De received the treatments listed below:        therapeutic activities to improve functional performance for 00  minutes, including:  Sit<>stand 10x    received therapeutic exercises to develop strength and ROM for 15 minutes including:  Calf stretch on wedge 2x 30sec  STATIC EXTENSION STRETCH ON STOOL IN SITTING X 5 MINS, 5 #  Heel Slides  20x  Heel Raise 10x     neuromuscular re-education activities to improve: Sense and Proprioception for 30 minutes. The following activities were included:  LONG SITTING QS + SELF OVER PRESSURE ON  STOOL 10 X 10 S  LONG SITTING QS + SLR ON LOW STOOL 3 X 10, 3 S HOLDS  SLR 2 x 10  LAQ  2 x 10 5sec hold 1#  TKE standing w/ G TB 2 x 10  SLS R LE 3x 30sec    received the following manual therapy techniques: Soft tissue Mobilization were applied to the:  for 00 minutes, including:      PATIENT EDUCATION AND HOME EXERCISES     Home Exercises Provided and Patient Education Provided     Education provided:   - Educated pt on the importance of daily stretch to increase the benefit of therapy and positive results.      Written Home Exercises Provided: Patient instructed to cont prior HEP. Exercises were reviewed and Santino was able to demonstrate them prior to the end of the session.  Santino demonstrated good  understanding of the education provided. See EMR under Patient Instructions for exercises provided during therapy sessions    ASSESSMENT   Pt tolerated session well with minimal c/o pain knee pain with extension stretch. Pt progressed to standing SLB  with minimal c/o fatigue. Cont to progress pt to increase quad strength and improve balance.     Santino Is progressing well towards his goals.   Pt prognosis is Good.     Pt will continue to benefit from skilled outpatient physical therapy to address the deficits listed in the problem list box on initial evaluation, provide pt/family education and to maximize pt's level of independence in the home and community environment.     Pt's spiritual, cultural and educational needs considered and pt agreeable to plan of care and goals.     Anticipated barriers to physical therapy: none    Goals:  Short Term Goals: 4 weeks   -right knee AROM 0-120 deg for improving function.  -right hip and knee grossly 4-/5 for improving fx.  -Pt (I) in bed mobility without difficulty.  -Dec edema at knee by 1 cm to assist with strength and ROM.  -FOTO impairment score 50 for improving fx.  -Pt will have normal gt.  -SLR without knee lag in extension.     Long Term Goals: 8 weeks   -LE  strength as tested grossly 4/5 or greater.  -FOTO impairment score 50 for improving fx.  -Pt will be (I) in maintenance program.     PLAN     Plan of care Certification: 8/25/2023 to 10/20/2023.     Outpatient Physical Therapy 2 times weekly for 8 weeks to include the following interventions: Aquatic Therapy, Electrical Stimulation IFC, Gait Training, Manual Therapy, Moist Heat/ Ice, Neuromuscular Re-ed, Patient Education, Self Care, Therapeutic Activities, Therapeutic Exercise, and Ultrasound.        Umu Diallo, PT

## 2023-09-06 DIAGNOSIS — I10 ESSENTIAL HYPERTENSION: ICD-10-CM

## 2023-09-06 RX ORDER — LOSARTAN POTASSIUM 100 MG/1
100 TABLET ORAL DAILY
Qty: 90 TABLET | Refills: 3 | Status: SHIPPED | OUTPATIENT
Start: 2023-09-06 | End: 2024-09-05

## 2023-09-06 NOTE — PROGRESS NOTES
[facility]   Physical Therapy Treatment Note     Name: Dayton Faye  Clinic Number: [id number]    Therapy Diagnosis:   Encounter Diagnoses   Name Primary?    Impaired range of motion of right knee Yes    Weakness of both lower extremities     Impaired gait and mobility      Physician: Issa Merida, VIN    Visit Date: 9/7/2023    Physician: Fili Scott,*     Physician Orders: PT Eval and Treat   Post Surgical? Yes Eval and Treat Yes Type of Therapy Outpatient Therapy   Medical Diagnosis from Referral: Z96.651 (ICD-10-CM) - Status post total knee replacement using cement, right   Evaluation Date: 8/25/2023  Authorization Period Expiration: 8/20/2024  Plan of Care Expiration: 10/20/2023  Progress Note Due: 9/24/2023  Visit # / Visits authorized: 10/20 (5 for this referral)  FOTO: 1/ 3     Precautions: Standard, Diabetes, and HTN       Time In: 1000  Time Out: 1100  Total Billable Time: 55 minutes    SUBJECTIVE     Pt reports: that he has some pain of right knee.  He was compliant with home exercise program.  Response to previous treatment: No change   Functional change: None    Pain: 0/10  Location: right below knee      OBJECTIVE     Objective Measures updated at progress report unless specified.     Ambulating with antalgic gt d/t right knee pain, but stable.    Knee Left active Left Passive Right Active R passive   Flexion 120 Nt d/t 120 110 AA NT   Extension 0 NT d/t 0 -2 0      TREATMENT     De received the treatments listed below:      therapeutic activities to improve functional performance for 08 minutes, including:  Sit<>stand 10x    received therapeutic exercises to develop strength and ROM for 30 minutes including:  Stat bike seat 14 5 minimum (recumbent 8 mins)  Calf stretch on wedge 2 x 1 mins  Standing at stairs knee extension stretch 2 x 1 mins  Standing at stairs knee flexion stretch 2 x 1 mins  STATIC EXTENSION STRETCH ON STOOL IN SITTING X 5 MINS, 5 #  NP  SLR with QS 2 x 10   PROM with over pressure stretch x 10    Not performed 9/7/2023:  Heel Slides  20x  Heel Raise 20x   Mini Squats 20x   Standing hip ABD 20x  Standing hip ext 20x  Standing HS Curl 20x    neuromuscular re-education activities to improve: Sense and Proprioception for 15 minutes. The following activities were included:  LONG SITTING QS   3 X 10, 5 S HOLDS  LAQ  2 x 10 5 sec hold 1#  TKE standing w/ G TB 2 x 10-np  SLS R LE 3x 30 sec-np    received the following manual therapy techniques: Soft tissue Mobilization were applied to the:  for 05 minutes, including:  Superior patellar mobs for knee extension gd III    PATIENT EDUCATION AND HOME EXERCISES     Home Exercises Provided and Patient Education Provided     Education provided:   - Educated pt on the importance of daily stretch to increase the benefit of therapy and positive results.      Written Home Exercises Provided: Patient instructed to cont prior HEP. Exercises were reviewed and Santino was able to demonstrate them prior to the end of the session.  Santino demonstrated good  understanding of the education provided. See EMR under Patient Instructions for exercises provided during therapy sessions    ASSESSMENT   Pt tolerated today's tx with progression of ther ex and neuromuscular re-ed well with some evidence of fatigue. He was able to add new exs for strengthening and ROM without difficulty or complaint. ROM is improving, but trouble with SIT TO STAND and notable WS ->Left is noted.  Cont to progress pt to increase quad strength, RPM and improve balance.     Santino Is progressing well towards his goals.   Pt prognosis is Good.     Pt will continue to benefit from skilled outpatient physical therapy to address the deficits listed in the problem list box on initial evaluation, provide pt/family education and to maximize pt's level of independence in the home and community environment.     Pt's spiritual, cultural and educational needs  considered and pt agreeable to plan of care and goals.     Anticipated barriers to physical therapy: none    Goals:  Short Term Goals: 4 weeks   -right knee AROM 0-120 deg for improving function.  -right hip and knee grossly 4-/5 for improving fx.  -Pt (I) in bed mobility without difficulty.  -Dec edema at knee by 1 cm to assist with strength and ROM.  -FOTO impairment score 50 for improving fx.  -Pt will have normal gt.  -SLR without knee lag in extension.     Long Term Goals: 8 weeks   -LE strength as tested grossly 4/5 or greater.  -FOTO impairment score 50 for improving fx.  -Pt will be (I) in maintenance program.     PLAN     Plan of care Certification: 8/25/2023 to 10/20/2023.     Outpatient Physical Therapy 2 times weekly for 8 weeks to include the following interventions: Aquatic Therapy, Electrical Stimulation IFC, Gait Training, Manual Therapy, Moist Heat/ Ice, Neuromuscular Re-ed, Patient Education, Self Care, Therapeutic Activities, Therapeutic Exercise, and Ultrasound.        Umu Diallo, PT

## 2023-09-06 NOTE — TELEPHONE ENCOUNTER
No care due was identified.  [facility] Embedded Care Due Messages. Reference number: [id number].   9/06/2023 12:08:06 PM CDT

## 2023-09-07 ENCOUNTER — CLINICAL SUPPORT (OUTPATIENT)
Dept: REHABILITATION | Facility: HOSPITAL | Age: 73
End: 2023-09-07
Payer: MEDICARE

## 2023-09-07 DIAGNOSIS — R26.89 IMPAIRED GAIT AND MOBILITY: ICD-10-CM

## 2023-09-07 DIAGNOSIS — R29.898 WEAKNESS OF BOTH LOWER EXTREMITIES: ICD-10-CM

## 2023-09-07 DIAGNOSIS — M25.661 IMPAIRED RANGE OF MOTION OF RIGHT KNEE: Primary | ICD-10-CM

## 2023-09-07 PROCEDURE — 97110 THERAPEUTIC EXERCISES: CPT | Mod: PO | Performed by: PHYSICAL THERAPIST

## 2023-09-07 PROCEDURE — 97112 NEUROMUSCULAR REEDUCATION: CPT | Mod: PO | Performed by: PHYSICAL THERAPIST

## 2023-09-07 PROCEDURE — 97530 THERAPEUTIC ACTIVITIES: CPT | Mod: PO | Performed by: PHYSICAL THERAPIST

## 2023-09-11 DIAGNOSIS — M62.838 MUSCLE SPASM: ICD-10-CM

## 2023-09-12 ENCOUNTER — CLINICAL SUPPORT (OUTPATIENT)
Dept: REHABILITATION | Facility: HOSPITAL | Age: 73
End: 2023-09-12
Payer: MEDICARE

## 2023-09-12 DIAGNOSIS — R26.89 IMPAIRED GAIT AND MOBILITY: ICD-10-CM

## 2023-09-12 DIAGNOSIS — R29.898 WEAKNESS OF BOTH LOWER EXTREMITIES: ICD-10-CM

## 2023-09-12 DIAGNOSIS — M25.661 IMPAIRED RANGE OF MOTION OF RIGHT KNEE: Primary | ICD-10-CM

## 2023-09-12 PROCEDURE — 97110 THERAPEUTIC EXERCISES: CPT | Mod: PO | Performed by: PHYSICAL THERAPIST

## 2023-09-12 PROCEDURE — 97112 NEUROMUSCULAR REEDUCATION: CPT | Mod: PO | Performed by: PHYSICAL THERAPIST

## 2023-09-12 PROCEDURE — 97530 THERAPEUTIC ACTIVITIES: CPT | Mod: PO | Performed by: PHYSICAL THERAPIST

## 2023-09-12 RX ORDER — TIZANIDINE 4 MG/1
4 TABLET ORAL EVERY 8 HOURS
Qty: 30 TABLET | Refills: 0 | Status: SHIPPED | OUTPATIENT
Start: 2023-09-12 | End: 2023-10-02

## 2023-09-14 ENCOUNTER — CLINICAL SUPPORT (OUTPATIENT)
Dept: REHABILITATION | Facility: HOSPITAL | Age: 73
End: 2023-09-14
Payer: MEDICARE

## 2023-09-14 DIAGNOSIS — M25.661 IMPAIRED RANGE OF MOTION OF RIGHT KNEE: Primary | ICD-10-CM

## 2023-09-14 DIAGNOSIS — R26.89 IMPAIRED GAIT AND MOBILITY: ICD-10-CM

## 2023-09-14 DIAGNOSIS — R29.898 WEAKNESS OF BOTH LOWER EXTREMITIES: ICD-10-CM

## 2023-09-14 PROCEDURE — 97112 NEUROMUSCULAR REEDUCATION: CPT | Mod: PO | Performed by: PHYSICAL THERAPIST

## 2023-09-14 PROCEDURE — 97110 THERAPEUTIC EXERCISES: CPT | Mod: PO | Performed by: PHYSICAL THERAPIST

## 2023-09-14 NOTE — PROGRESS NOTES
[facility]   Physical Therapy Treatment Note     Name: Dayton Faye  Clinic Number: [id number]    Therapy Diagnosis:   Encounter Diagnoses   Name Primary?    Impaired range of motion of right knee Yes    Weakness of both lower extremities     Impaired gait and mobility      Physician: Issa Merida, VIN    Visit Date: 9/14/2023    Physician: Fili Scott,*     Physician Orders: PT Eval and Treat   Post Surgical? Yes Eval and Treat Yes Type of Therapy Outpatient Therapy   Medical Diagnosis from Referral: Z96.651 (ICD-10-CM) - Status post total knee replacement using cement, right   Evaluation Date: 8/25/2023  Authorization Period Expiration: 8/20/2024  Plan of Care Expiration: 10/20/2023  Progress Note Due: 9/24/2023  Visit # / Visits authorized: 12/20 (7 for this referral)  FOTO: 1/ 3     Precautions: Standard, Diabetes, and HTN       Time In: 1100  Time Out: 1200  Total Billable Time: 55 minutes    SUBJECTIVE     Pt reports: he has been trying to walk a little with cane in hand only. Right knee pain and ankle pain.  He was compliant with home exercise program.  Response to previous treatment: No change   Functional change: None    Pain: 3/10  Location: right below knee      OBJECTIVE     Objective Measures updated at progress report unless specified.     Ambulating with antalgic gt d/t right knee pain, but stable.    Knee Left active Left Passive Right Active R passive   Flexion 120 Nt d/t 120 110    Extension 0 NT d/t 0 0 0      TREATMENT     De received the treatments listed below:      therapeutic activities to improve functional performance for 00 minutes, including:      received therapeutic exercises to develop strength and ROM for 40 minutes including:  Stat bike seat 14 5 mins (recumbent 8 mins)  Calf stretch on wedge 2 x 1 mins  Standing at stairs knee extension stretch x 2 mins  Standing at stairs knee flexion stretch x 2 mins  SLR with QS 2 x 20  Heel  "slides 10 x 15"  Sit<>stand 10x    Standing hip 2 x 10:  Abduction  Extension  March    Standing 2 x 20:  Heel raises  HSC  Mini squat 2 x 10    neuromuscular re-education activities to improve: Sense and Proprioception for 15 minutes. The following activities were included:  LONG SITTING QS   3 X 10, 5 S HOLDS  LAQ 3 x 8 with ball for VMO and 3#  Side lying clam 2 x 10 (Vcs and Tcs for glute recruitment and avoid spinal rotation)  TKE with hip extended in standing w/ GTB 2 x 10  SLS R LE 3x 30 sec on floor and bilateral  Tandem stance 3 x 30"    received the following manual therapy techniques: Soft tissue Mobilization were applied to the:  for 00 minutes, including:  Superior and inf patellar mobs grade III and tibial INTERNAL ROTATION gd II for knee ROM    Gait training in // bars x 5 mins.  Heel strike-foot flat/quad and glute recruitment    PATIENT EDUCATION AND HOME EXERCISES     Home Exercises Provided and Patient Education Provided     Education provided:   - Educated pt on the importance of daily stretch to increase the benefit of therapy and positive results.      Written Home Exercises Provided: Patient instructed to cont prior HEP. Exercises were reviewed and Santino was able to demonstrate them prior to the end of the session.  Santino demonstrated good  understanding of the education provided. See EMR under Patient Instructions for exercises provided during therapy sessions    ASSESSMENT   Pt having difficulty with endurance training, backed down to 2 x 10. Some knee lag, but improving in SLR. PROM 120 deg flexion, but AROM hanging in at 112 deg. Pt continues with mild swelling and with strength deficits likely most notable cause of difficulty with active knee flexion. Pt unsteady without cane. Pt encouraged to focus on initial gt cycle in home WS.    Santino Is progressing well towards his goals.   Pt prognosis is Good.     Pt will continue to benefit from skilled outpatient physical therapy to address " the deficits listed in the problem list box on initial evaluation, provide pt/family education and to maximize pt's level of independence in the home and community environment.     Pt's spiritual, cultural and educational needs considered and pt agreeable to plan of care and goals.     Anticipated barriers to physical therapy: none    Goals:  Short Term Goals: 4 weeks   -right knee AROM 0-120 deg for improving function.  -right hip and knee grossly 4-/5 for improving fx.  -Pt (I) in bed mobility without difficulty.  -Dec edema at knee by 1 cm to assist with strength and ROM.  -FOTO impairment score 50 for improving fx.  -Pt will have normal gt.  -SLR without knee lag in extension.     Long Term Goals: 8 weeks   -LE strength as tested grossly 4/5 or greater.  -FOTO impairment score 50 for improving fx.  -Pt will be (I) in maintenance program.     PLAN     Plan of care Certification: 8/25/2023 to 10/20/2023.     Outpatient Physical Therapy 2 times weekly for 8 weeks to include the following interventions: Aquatic Therapy, Electrical Stimulation IFC, Gait Training, Manual Therapy, Moist Heat/ Ice, Neuromuscular Re-ed, Patient Education, Self Care, Therapeutic Activities, Therapeutic Exercise, and Ultrasound.        Umu Diallo, PT

## 2023-09-20 ENCOUNTER — OFFICE VISIT (OUTPATIENT)
Dept: ORTHOPEDICS | Facility: CLINIC | Age: 73
End: 2023-09-20
Payer: MEDICARE

## 2023-09-20 ENCOUNTER — CLINICAL SUPPORT (OUTPATIENT)
Dept: REHABILITATION | Facility: HOSPITAL | Age: 73
End: 2023-09-20
Payer: MEDICARE

## 2023-09-20 VITALS — BODY MASS INDEX: 30.1 KG/M2 | WEIGHT: 215 LBS | RESPIRATION RATE: 18 BRPM | HEIGHT: 71 IN

## 2023-09-20 DIAGNOSIS — M17.11 PRIMARY OSTEOARTHRITIS OF RIGHT KNEE: ICD-10-CM

## 2023-09-20 DIAGNOSIS — R26.89 IMPAIRED GAIT AND MOBILITY: ICD-10-CM

## 2023-09-20 DIAGNOSIS — R29.898 WEAKNESS OF BOTH LOWER EXTREMITIES: ICD-10-CM

## 2023-09-20 DIAGNOSIS — Z96.651 STATUS POST TOTAL KNEE REPLACEMENT USING CEMENT, RIGHT: Primary | ICD-10-CM

## 2023-09-20 DIAGNOSIS — M25.661 IMPAIRED RANGE OF MOTION OF RIGHT KNEE: Primary | ICD-10-CM

## 2023-09-20 PROCEDURE — 97112 NEUROMUSCULAR REEDUCATION: CPT | Mod: PO,CQ

## 2023-09-20 PROCEDURE — 99999 PR PBB SHADOW E&M-EST. PATIENT-LVL III: ICD-10-PCS | Mod: PBBFAC,,, | Performed by: ORTHOPAEDIC SURGERY

## 2023-09-20 PROCEDURE — 99024 PR POST-OP FOLLOW-UP VISIT: ICD-10-PCS | Mod: POP,,, | Performed by: ORTHOPAEDIC SURGERY

## 2023-09-20 PROCEDURE — 99213 OFFICE O/P EST LOW 20 MIN: CPT | Mod: PBBFAC,PN | Performed by: ORTHOPAEDIC SURGERY

## 2023-09-20 PROCEDURE — 99999 PR PBB SHADOW E&M-EST. PATIENT-LVL III: CPT | Mod: PBBFAC,,, | Performed by: ORTHOPAEDIC SURGERY

## 2023-09-20 PROCEDURE — 97110 THERAPEUTIC EXERCISES: CPT | Mod: PO,CQ

## 2023-09-20 PROCEDURE — 99024 POSTOP FOLLOW-UP VISIT: CPT | Mod: POP,,, | Performed by: ORTHOPAEDIC SURGERY

## 2023-09-20 RX ORDER — IBUPROFEN 600 MG/1
600 TABLET ORAL EVERY 6 HOURS PRN
Qty: 30 TABLET | Refills: 2 | Status: SHIPPED | OUTPATIENT
Start: 2023-09-20 | End: 2024-02-06

## 2023-09-20 NOTE — PROGRESS NOTES
Past Medical History:   Diagnosis Date    General anesthetics causing adverse effect in therapeutic use     takes longer to wake up after anesthesia    Hypertension     PONV (postoperative nausea and vomiting)     Pre-diabetes     Retinal detachment        Past Surgical History:   Procedure Laterality Date    BACK SURGERY      CATARACT EXTRACTION Bilateral     EYE SURGERY      HIP SURGERY Right     JOINT REPLACEMENT  2016    Right hip joint replaced    KNEE SURGERY Left     RETINAL DETACHMENT SURGERY Left     ROBOTIC ARTHROPLASTY, KNEE Right 8/8/2023    Procedure: ROBOTIC ARTHROPLASTY, KNEE, TOTAL;  Surgeon: Fili Scott MD;  Location: [facility];  Service: Orthopedics;  Laterality: Right;    SHOULDER SURGERY Right     SHOULDER SURGERY Left     SPINE SURGERY      lumbar fusion       Current Outpatient Medications   Medication Sig    aspirin (ECOTRIN) 81 MG EC tablet Take 1 tablet (81 mg total) by mouth 2 (two) times a day.    atorvastatin (LIPITOR) 20 MG tablet Take 1 tablet (20 mg total) by mouth once daily.    CLARITIN-D 24 HOUR  mg per 24 hr tablet Take 1 tablet by mouth.    coenzyme Q10 100 mg capsule Take 200 mg by mouth.    furosemide (LASIX) 40 MG tablet Take 1 tablet (40 mg total) by mouth daily as needed (swelling).    ginkgo biloba 120 mg Tab Take by mouth once daily.    ibuprofen (ADVIL,MOTRIN) 600 MG tablet Take 1 tablet (600 mg total) by mouth every 6 (six) hours as needed for Pain.    krill-om-3-dha-epa-phospho-ast 160-916-74-64 mg Cap Take by mouth once daily.    losartan (COZAAR) 100 MG tablet Take 1 tablet (100 mg total) by mouth once daily.    mv-min-folic acid-lutein 500-250 mcg Chew Take by mouth.    pantoprazole (PROTONIX) 40 MG tablet Take 1 tablet (40 mg total) by mouth once daily.    potassium chloride SA (K-DUR,KLOR-CON M) 10 MEQ tablet Take 10 mEq by mouth. PRN WITH LASIX    prednisoLONE acetate (PRED MILD) 0.12 % ophthalmic suspension Place 1 drop into the left eye every  "morning.    tiZANidine (ZANAFLEX) 4 MG tablet Take 1 tablet (4 mg total) by mouth every 8 (eight) hours.    vit C/E/Zn/coppr/lutein/zeaxan (PRESERVISION AREDS-2 ORAL) Take by mouth 2 (two) times a day.    acetaminophen (TYLENOL) 500 MG tablet Take 2 tablets (1,000 mg total) by mouth every 8 (eight) hours as needed for Pain. (Patient not taking: Reported on 2023)    oxyCODONE-acetaminophen (PERCOCET) 5-325 mg per tablet Take 1 tablet by mouth every 6 (six) hours as needed for Pain. (Patient not taking: Reported on 2023)     No current facility-administered medications for this visit.     Facility-Administered Medications Ordered in Other Visits   Medication    electrolyte-S (ISOLYTE)    electrolyte-S (ISOLYTE)    fentaNYL 50 mcg/mL injection 25 mcg    fentaNYL 50 mcg/mL injection  mcg    LIDOcaine (PF) 10 mg/ml (1%) injection 10 mg    midazolam (VERSED) 1 mg/mL injection 2 mg    ondansetron injection 4 mg    oxyCODONE immediate release tablet 5 mg       Review of patient's allergies indicates:   Allergen Reactions    Adhesive Rash     Patients states "surgical tape."    Latex, natural rubber Rash       Family History   Problem Relation Age of Onset    Macular degeneration Father     Heart disease Father     Arthritis Father          at age 92    Hypertension Father     Vision loss Father     No Known Problems Sister     No Known Problems Brother     Cancer Paternal Grandmother         Lung    Cancer Paternal Grandfather         Prostate    Glaucoma Neg Hx        Social History     Socioeconomic History    Marital status:    Occupational History    Occupation: School psychologist     Comment: retired   Tobacco Use    Smoking status: Former     Current packs/day: 0.00     Average packs/day: 0.5 packs/day for 5.0 years (2.5 ttl pk-yrs)     Types: Cigarettes, Pipe     Start date: 1986     Quit date: 1991     Years since quittin.7    Smokeless tobacco: Never   Substance and Sexual " Activity    Alcohol use: Yes     Comment: Very occasionally    Drug use: Never    Sexual activity: Yes     Partners: Female     Birth control/protection: Post-menopausal   Social History Narrative    Lives with female partner.  Moved here from [place]. Two cats. Enjoys ancestry, reading.       Chief Complaint:   Chief Complaint   Patient presents with    Post-op Evaluation     S/p right TKA. Dos 8/8/23       Date of surgery:8/8/23    History of present illness:  72-year-old male underwent right robotic assisted total knee arthroplasty.  He is doing very well.  Walking without an assistive device.  Just has a little swelling.  Pain is minimal as a 1/10.      Review of Systems:    Musculoskeletal:  See HPI        Physical Examination:    Vital Signs:    Vitals:    09/20/23 1350   Resp: 18       Body mass index is 29.99 kg/m².    This a well-developed, well nourished patient in no acute distress.  They are alert and oriented and cooperative to examination.  Pt. walks without an antalgic gait.      Examination right knee shows well-healed surgical incision .  No erythema drainage. No calf pain.  Range of motion about 0-120 degrees.    X-rays:  Two views of the right knee are  reviewed which show well-aligned total knee arthroplasty components without complication     Assessment::  Status post right robotic assisted total knee arthroplasty using cement    Plan:  I refilled the ibuprofen.  Continue with the outpatient physical therapy.  I will see him in 6 weeks with x-rays of both of his knees.    This note was created using M Modal voice recognition software that occasionally misinterpreted phrases or words.

## 2023-09-20 NOTE — PROGRESS NOTES
[facility]   Physical Therapy Treatment Note     Name: Dayton Faye  Clinic Number: [id number]    Therapy Diagnosis:   Encounter Diagnoses   Name Primary?    Impaired range of motion of right knee Yes    Weakness of both lower extremities     Impaired gait and mobility        Physician: Issa Merida, VIN    Visit Date: 9/20/2023    Physician: Fili Scott,*     Physician Orders: PT Eval and Treat   Post Surgical? Yes Eval and Treat Yes Type of Therapy Outpatient Therapy   Medical Diagnosis from Referral: Z96.651 (ICD-10-CM) - Status post total knee replacement using cement, right   Evaluation Date: 8/25/2023  Authorization Period Expiration: 8/20/2024  Plan of Care Expiration: 10/20/2023  Progress Note Due: 9/24/2023  Visit # / Visits authorized: 13/20 (7 for this referral)  FOTO: 1/ 3     Precautions: Standard, Diabetes, and HTN       Time In: 1:02  Time Out: 1:47  Total Billable Time: 40 minutes    SUBJECTIVE     Pt reports: having minimal knee pain today. Pt still amb with cane due to balance.  He was compliant with home exercise program.  Response to previous treatment: No change   Functional change: able to flex knee enough to put soaks and shoes on     Pain: 1/10  Location: right below knee      OBJECTIVE     Objective Measures updated at progress report unless specified.     Ambulating with antalgic gt d/t right knee pain, but stable.    Knee Left active Left Passive Right Active R passive   Flexion 120 Nt d/t 120 110    Extension 0 NT d/t 0 0 0      TREATMENT     De received the treatments listed below:      therapeutic activities to improve functional performance for 00 minutes, including:      received therapeutic exercises to develop strength and ROM for 25 minutes including:  Stat bike seat 14 5 mins (recumbent 10 mins)  Calf stretch on wedge 2 x 1 mins  Standing at stairs knee extension stretch x 2 mins  Standing at stairs knee flexion stretch x 2  "mins  SLR with QS 2 x 20  Heel slides 10 x 15"  Sit<>stand 10x    Standing hip 2 x 10: (NP)  Abduction  Extension  March    Standing 2 x 20:(NP)  Heel raises  HSC  Mini squat 2 x 10    neuromuscular re-education activities to improve: Sense and Proprioception for 15 minutes. The following activities were included:  LONG SITTING QS   3 X 10, 5 S HOLDS  LAQ 3 x 8 with ball for VMO and 3#  Side lying clam 2 x 10 (Vcs and Tcs for glute recruitment and avoid spinal rotation)  TKE with hip extended in standing w/ GTB 2 x 10  SLS R LE 3x 30 sec on floor and bilateral  Tandem stance 3 x 30"    received the following manual therapy techniques: Soft tissue Mobilization were applied to the:  for 00 minutes, including:  Superior and inf patellar mobs grade III and tibial INTERNAL ROTATION gd II for knee ROM    Gait training in // bars x 5 mins.  Heel strike-foot flat/quad and glute recruitment    PATIENT EDUCATION AND HOME EXERCISES     Home Exercises Provided and Patient Education Provided     Education provided:   - Educated pt on the importance of daily stretch to increase the benefit of therapy and positive results.      Written Home Exercises Provided: Patient instructed to cont prior HEP. Exercises were reviewed and Santino was able to demonstrate them prior to the end of the session.  Santino demonstrated good  understanding of the education provided. See EMR under Patient Instructions for exercises provided during therapy sessions    ASSESSMENT   Pt demo no knee lag with SLR. Flexion and extension knee ROM cont to do well. Balance conts to be off and needs SC to increase safety.     Santino Is progressing well towards his goals.   Pt prognosis is Good.     Pt will continue to benefit from skilled outpatient physical therapy to address the deficits listed in the problem list box on initial evaluation, provide pt/family education and to maximize pt's level of independence in the home and community environment.     Pt's " spiritual, cultural and educational needs considered and pt agreeable to plan of care and goals.     Anticipated barriers to physical therapy: none    Goals:  Short Term Goals: 4 weeks   -right knee AROM 0-120 deg for improving function.  -right hip and knee grossly 4-/5 for improving fx.  -Pt (I) in bed mobility without difficulty.  -Dec edema at knee by 1 cm to assist with strength and ROM.  -FOTO impairment score 50 for improving fx.  -Pt will have normal gt.  -SLR without knee lag in extension.     Long Term Goals: 8 weeks   -LE strength as tested grossly 4/5 or greater.  -FOTO impairment score 50 for improving fx.  -Pt will be (I) in maintenance program.     PLAN     Plan of care Certification: 8/25/2023 to 10/20/2023.     Outpatient Physical Therapy 2 times weekly for 8 weeks to include the following interventions: Aquatic Therapy, Electrical Stimulation IFC, Gait Training, Manual Therapy, Moist Heat/ Ice, Neuromuscular Re-ed, Patient Education, Self Care, Therapeutic Activities, Therapeutic Exercise, and Ultrasound.        Lourdes Durham, PTA

## 2023-09-22 ENCOUNTER — CLINICAL SUPPORT (OUTPATIENT)
Dept: REHABILITATION | Facility: HOSPITAL | Age: 73
End: 2023-09-22
Payer: MEDICARE

## 2023-09-22 DIAGNOSIS — R29.898 WEAKNESS OF BOTH LOWER EXTREMITIES: ICD-10-CM

## 2023-09-22 DIAGNOSIS — R26.89 IMPAIRED GAIT AND MOBILITY: ICD-10-CM

## 2023-09-22 DIAGNOSIS — M25.661 IMPAIRED RANGE OF MOTION OF RIGHT KNEE: Primary | ICD-10-CM

## 2023-09-22 PROCEDURE — 97530 THERAPEUTIC ACTIVITIES: CPT | Mod: PO | Performed by: PHYSICAL THERAPIST

## 2023-09-22 PROCEDURE — 97116 GAIT TRAINING THERAPY: CPT | Mod: PO | Performed by: PHYSICAL THERAPIST

## 2023-09-22 PROCEDURE — 97110 THERAPEUTIC EXERCISES: CPT | Mod: PO | Performed by: PHYSICAL THERAPIST

## 2023-09-22 NOTE — PROGRESS NOTES
[facility]   Physical Therapy Treatment Note     Name: Dayton Faye  Clinic Number: [id number]    Therapy Diagnosis:   Encounter Diagnoses   Name Primary?    Impaired range of motion of right knee Yes    Weakness of both lower extremities     Impaired gait and mobility        Physician: Issa Merida, VIN    Visit Date: 9/22/2023    Physician: Fili Scott,*     Physician Orders: PT Eval and Treat   Post Surgical? Yes Eval and Treat Yes Type of Therapy Outpatient Therapy   Medical Diagnosis from Referral: Z96.651 (ICD-10-CM) - Status post total knee replacement using cement, right   Evaluation Date: 8/25/2023  Authorization Period Expiration: 8/20/2024  Plan of Care Expiration: 10/20/2023  Progress Note Due: 9/24/2023  Visit # / Visits authorized: 14/20 (8 for this referral)  FOTO: 1/ 3     Precautions: Standard, Diabetes, and HTN       Time In: 902  Time Out: 1000  Total Billable Time: 54 minutes    SUBJECTIVE     Pt reports: having minimal knee pain today. Stiff. Pt still amb with cane due to balance.  He was compliant with home exercise program.  Response to previous treatment: No change   Functional change: able to flex knee enough to put soaks and shoes on     Pain: 1/10  Location: right below knee      OBJECTIVE     Objective Measures updated at progress report unless specified.     Ambulating with antalgic gt d/t right knee pain, but stable.    Knee Left active Left Passive Right Active R passive   Flexion 120 Nt d/t 120 110    Extension 0 NT d/t 0 0 0      TREATMENT     De received the treatments listed below:      therapeutic activities to improve functional performance for 00 minutes, including:      received therapeutic exercises to develop strength and ROM for 35 minutes including:  Stat bike seat 14 5 mins  Calf stretch on wedge 2 x 1 mins  HSC 60# 3 x 10  Leg extension 10# SINGLE LEG 2 x 10  Leg extension DOUBLE LEG 20# 2 x 10  Leg press DOUBLE LEG  "30# 3 x 10  Leg press SINGLE LEG 20# 3 x 10    Not performed:  Standing at stairs knee extension stretch x 2 mins  Standing at stairs knee flexion stretch x 2 mins  SLR with QS 2 x 20  Heel slides 10 x 15"  Sit<>stand 10x  Standing hip 2 x 10:  Abduction  Extension  March  Standing 2 x 20:  HSC  Mini squat 2 x 10    neuromuscular re-education activities to improve: Sense and Proprioception for 00 minutes. The following activities were included:  LONG SITTING QS   3 X 10, 5 S HOLDS  LAQ 3 x 8 with ball for VMO and 3#  Side lying clam 2 x 10 (Vcs and Tcs for glute recruitment and avoid spinal rotation)  TKE with hip extended in standing w/ GTB 2 x 10  SLS R LE 3x 30 sec on floor and bilateral  Tandem stance 3 x 30"    received the following manual therapy techniques: Soft tissue Mobilization were applied to the:  for 00 minutes, including:  Superior and inf patellar mobs grade III and tibial INTERNAL ROTATION gd II for knee ROM    Gait training in // bars x 10 mins.  Heel strike-foot flat/quad and glute recruitment, 2 laps in clinic, focus on Left LE stance    Therapeutic Activities x 15 mins to improve gt.  Standing heel raises on calf board L2 incline x 20  Step ups 6" x 20    PATIENT EDUCATION AND HOME EXERCISES     Home Exercises Provided and Patient Education Provided     Education provided:   - Educated pt on the importance of daily stretch to increase the benefit of therapy and positive results.      Written Home Exercises Provided: Patient instructed to cont prior HEP. Exercises were reviewed and Santino was able to demonstrate them prior to the end of the session.  Santino demonstrated good  understanding of the education provided. See EMR under Patient Instructions for exercises provided during therapy sessions    ASSESSMENT     Santino is limited by chronic contralateral LE strength which also impairs his safety without AD at this time. We discussed this and Selam's goal is to walk (I) without AD. He was " instructed to perform wt shifts at counter with a stance hold of each leg. Balance conts to be off and needs SC to increase safety. Cont progressing to goals working balance and strength to walking without AD if appropriate in a few weeks.    Santino Is progressing well towards his goals.   Pt prognosis is Good.     Pt will continue to benefit from skilled outpatient physical therapy to address the deficits listed in the problem list box on initial evaluation, provide pt/family education and to maximize pt's level of independence in the home and community environment.     Pt's spiritual, cultural and educational needs considered and pt agreeable to plan of care and goals.     Anticipated barriers to physical therapy: none    Goals:  Short Term Goals: 4 weeks   -right knee AROM 0-120 deg for improving function.  -right hip and knee grossly 4-/5 for improving fx.  -Pt (I) in bed mobility without difficulty.  -Dec edema at knee by 1 cm to assist with strength and ROM.  -FOTO impairment score 50 for improving fx.  -Pt will have normal gt.  -SLR without knee lag in extension.     Long Term Goals: 8 weeks   -LE strength as tested grossly 4/5 or greater.  -FOTO impairment score 50 for improving fx.  -Pt will be (I) in maintenance program.     PLAN     Plan of care Certification: 8/25/2023 to 10/20/2023.     Outpatient Physical Therapy 2 times weekly for 8 weeks to include the following interventions: Aquatic Therapy, Electrical Stimulation IFC, Gait Training, Manual Therapy, Moist Heat/ Ice, Neuromuscular Re-ed, Patient Education, Self Care, Therapeutic Activities, Therapeutic Exercise, and Ultrasound.        Umu Diallo, PT

## 2023-09-26 ENCOUNTER — CLINICAL SUPPORT (OUTPATIENT)
Dept: REHABILITATION | Facility: HOSPITAL | Age: 73
End: 2023-09-26
Payer: MEDICARE

## 2023-09-26 DIAGNOSIS — R29.898 WEAKNESS OF BOTH LOWER EXTREMITIES: ICD-10-CM

## 2023-09-26 DIAGNOSIS — M25.661 IMPAIRED RANGE OF MOTION OF RIGHT KNEE: Primary | ICD-10-CM

## 2023-09-26 DIAGNOSIS — R26.89 IMPAIRED GAIT AND MOBILITY: ICD-10-CM

## 2023-09-26 PROCEDURE — 97530 THERAPEUTIC ACTIVITIES: CPT | Mod: PO | Performed by: PHYSICAL THERAPIST

## 2023-09-26 PROCEDURE — 97110 THERAPEUTIC EXERCISES: CPT | Mod: PO | Performed by: PHYSICAL THERAPIST

## 2023-09-26 PROCEDURE — 97116 GAIT TRAINING THERAPY: CPT | Mod: PO | Performed by: PHYSICAL THERAPIST

## 2023-09-26 NOTE — PROGRESS NOTES
[facility]   Physical Therapy Treatment Note     Name: Dayton Faye  Clinic Number: [id number]    Therapy Diagnosis:   Encounter Diagnoses   Name Primary?    Impaired range of motion of right knee Yes    Weakness of both lower extremities     Impaired gait and mobility        Physician: Issa Merida, VIN    Visit Date: 9/26/2023    Physician: Fili Scott,*     Physician Orders: PT Eval and Treat   Post Surgical? Yes Eval and Treat Yes Type of Therapy Outpatient Therapy   Medical Diagnosis from Referral: Z96.651 (ICD-10-CM) - Status post total knee replacement using cement, right   Evaluation Date: 8/25/2023  Authorization Period Expiration: 8/20/2024  Plan of Care Expiration: 10/20/2023  Progress Note Due: 9/24/2023  Visit # / Visits authorized: 15/20 (9 for this referral)  FOTO: 1/ 3     Precautions: Standard, Diabetes, and HTN       Time In: 1600  Time Out: 1700  Total Billable Time: 55 minutes    SUBJECTIVE     Pt reports: having minimal knee pain today. Pt still amb with cane due to balance. He is limited by Left ankle strength and hip strength.  He was compliant with home exercise program.  Response to previous treatment: No change   Functional change: able to flex knee enough to put soaks and shoes on     Pain: 1/10  Location: right below knee      OBJECTIVE     Objective Measures updated at progress report unless specified.     Ambulating with antalgic gt d/t right knee pain, but stable.    Knee Left active Left Passive Right Active R passive   Flexion 120 Nt d/t 120 110    Extension 0 NT d/t 0 0 0      TREATMENT     De received the treatments listed below:      therapeutic activities to improve functional performance for 00 minutes, including:      received therapeutic exercises to develop strength and ROM for 30 minutes including:  Stat bike seat 14 5 mins  Calf stretch on wedge 2 x 1 mins  HSC 60# 3 x 10  Leg extension 10# SINGLE LEG 2 x 10  Leg  "extension DOUBLE LEG 20# 2 x 10  Leg press DOUBLE LEG 30# 3 x 10  Leg press SINGLE LEG 20# 3 x 10    Not performed:  Standing at stairs knee extension stretch x 2 mins  Standing at stairs knee flexion stretch x 2 mins  SLR with QS 2 x 20  Heel slides 10 x 15"  Sit<>stand 10x  Standing hip 2 x 10:  Abduction  Extension  March  Standing 2 x 20:  HSC  Mini squat 2 x 10    neuromuscular re-education activities to improve: Sense and Proprioception for 00 minutes. The following activities were included:  LONG SITTING QS   3 X 10, 5 S HOLDS  LAQ 3 x 8 with ball for VMO and 3#  Side lying clam 2 x 10 (Vcs and Tcs for glute recruitment and avoid spinal rotation)  TKE with hip extended in standing w/ GTB 2 x 10  SLS R LE 3x 30 sec on floor and bilateral  Tandem stance 3 x 30"    received the following manual therapy techniques: Soft tissue Mobilization were applied to the:  for 00 minutes, including:  Superior and inf patellar mobs grade III and tibial INTERNAL ROTATION gd II for knee ROM    Gait training in // bars x 10 mins.  Heel strike-foot flat/quad and glute recruitment, 5 laps in // bars, focus on Left LE stance    Therapeutic Activities x 15 mins to improve gt.  Standing heel raises on calf board L2 incline x 20  Step ups 6" x 20    PATIENT EDUCATION AND HOME EXERCISES     Home Exercises Provided and Patient Education Provided     Education provided:   - Educated pt on the importance of daily stretch to increase the benefit of therapy and positive results.      Written Home Exercises Provided: Patient instructed to cont prior HEP. Exercises were reviewed and Santino was able to demonstrate them prior to the end of the session.  Santino demonstrated good  understanding of the education provided. See EMR under Patient Instructions for exercises provided during therapy sessions    ASSESSMENT     Santino is limited by chronic contralateral LE strength which also impairs his safety without AD at this time. We discussed this " and Selam's goal is to walk (I) without AD. He is quick to get off the Left LE d/t Left LE weakness and calf atrophy. Pt will benefit from additional balance challenges; however, this has likely been impaired for a long time given Left LE. Cont progressing to goals working balance and strength to walking without AD if appropriate in a few weeks.    Santino Is progressing well towards his goals.   Pt prognosis is Good.     Pt will continue to benefit from skilled outpatient physical therapy to address the deficits listed in the problem list box on initial evaluation, provide pt/family education and to maximize pt's level of independence in the home and community environment.     Pt's spiritual, cultural and educational needs considered and pt agreeable to plan of care and goals.     Anticipated barriers to physical therapy: none    Goals:  Short Term Goals: 4 weeks   -right knee AROM 0-120 deg for improving function.  -right hip and knee grossly 4-/5 for improving fx.  -Pt (I) in bed mobility without difficulty.  -Dec edema at knee by 1 cm to assist with strength and ROM.  -FOTO impairment score 50 for improving fx.  -Pt will have normal gt.  -SLR without knee lag in extension.     Long Term Goals: 8 weeks   -LE strength as tested grossly 4/5 or greater.  -FOTO impairment score 50 for improving fx.  -Pt will be (I) in maintenance program.     PLAN     Plan of care Certification: 8/25/2023 to 10/20/2023.     Outpatient Physical Therapy 2 times weekly for 8 weeks to include the following interventions: Aquatic Therapy, Electrical Stimulation IFC, Gait Training, Manual Therapy, Moist Heat/ Ice, Neuromuscular Re-ed, Patient Education, Self Care, Therapeutic Activities, Therapeutic Exercise, and Ultrasound.        Umu Diallo, PT

## 2023-09-29 ENCOUNTER — CLINICAL SUPPORT (OUTPATIENT)
Dept: REHABILITATION | Facility: HOSPITAL | Age: 73
End: 2023-09-29
Payer: MEDICARE

## 2023-09-29 DIAGNOSIS — R26.89 IMPAIRED GAIT AND MOBILITY: ICD-10-CM

## 2023-09-29 DIAGNOSIS — R29.898 WEAKNESS OF BOTH LOWER EXTREMITIES: ICD-10-CM

## 2023-09-29 DIAGNOSIS — M25.661 IMPAIRED RANGE OF MOTION OF RIGHT KNEE: Primary | ICD-10-CM

## 2023-09-29 PROCEDURE — 97530 THERAPEUTIC ACTIVITIES: CPT | Mod: PO,CQ

## 2023-09-29 PROCEDURE — 97110 THERAPEUTIC EXERCISES: CPT | Mod: PO,CQ

## 2023-09-29 PROCEDURE — 97112 NEUROMUSCULAR REEDUCATION: CPT | Mod: PO,CQ

## 2023-09-29 NOTE — PROGRESS NOTES
[facility]   Physical Therapy Treatment Note     Name: Dayton Faye  Clinic Number: [id number]    Therapy Diagnosis:   Encounter Diagnoses   Name Primary?    Impaired range of motion of right knee Yes    Weakness of both lower extremities     Impaired gait and mobility        Physician: Issa Merida, VIN    Visit Date: 9/29/2023    Physician: Fili Scott,*     Physician Orders: PT Eval and Treat   Post Surgical? Yes Eval and Treat Yes Type of Therapy Outpatient Therapy   Medical Diagnosis from Referral: Z96.651 (ICD-10-CM) - Status post total knee replacement using cement, right   Evaluation Date: 8/25/2023  Authorization Period Expiration: 8/20/2024  Plan of Care Expiration: 10/20/2023  Progress Note Due: 9/24/2023  Visit # / Visits authorized: 16/20 (10 for this referral)  FOTO: 1/ 3     Precautions: Standard, Diabetes, and HTN       Time In: 10:00 am  Time Out: 11:00 am  Total Billable Time: 60 minutes    SUBJECTIVE     Pt reports: Min R knee pain currently. He states he woke up with a little bit more pain in R knee but took pain medication prior to today's visit.    He was compliant with home exercise program.  Response to previous treatment: No change   Functional change: able to flex knee enough to put soaks and shoes on     Pain: 1/10  Location: right below knee      OBJECTIVE     Objective Measures updated at progress report unless specified.     9/26/23  Knee Left active Left Passive Right Active R passive   Flexion 120 Nt d/t 120 110    Extension 0 NT d/t 0 0 0      TREATMENT     De received the treatments listed below:      therapeutic activities to improve functional performance for 00 minutes, including:      received therapeutic exercises to develop strength and ROM for 30 minutes including:  Stat bike seat 14 8 mins  Calf stretch on wedge 2 x 1 mins  HSC 60# 3 x 10  Leg extension 10# SINGLE LEG 2 x 10  Leg extension DOUBLE LEG 20# 2 x 10  Leg press  "DOUBLE LEG 30# 3 x 10  Leg press SINGLE LEG 20# 3 x 10    Not performed:  Standing at stairs knee extension stretch x 2 mins  Standing at stairs knee flexion stretch x 2 mins  SLR with QS 2 x 20  Heel slides 10 x 15"  Sit<>stand 10x  Standing hip 2 x 10:  Abduction  Extension  March  Standing 2 x 20:  HSC  Mini squat 2 x 10    neuromuscular re-education activities to improve: Sense and Proprioception for 20 minutes. The following activities were included:  LONG SITTING QS   3 X 10, 5 S HOLDS  LAQ 3 x 8 with ball for VMO and 3#  Side lying clam 2 x 10 (Vcs and Tcs for glute recruitment and avoid spinal rotation)  TKE with hip extended in standing w/ GTB 2 x 10  SLS R LE 3x 30 sec on floor and bilateral  Tandem stance 3 x 30"    received the following manual therapy techniques: Soft tissue Mobilization were applied to the:  for 00 minutes, including:  Superior and inf patellar mobs grade III and tibial INTERNAL ROTATION gd II for knee ROM    Gait training in // bars x 00 mins.  Heel strike-foot flat/quad and glute recruitment, 5 laps in // bars, focus on Left LE stance    Therapeutic Activities x 10 mins to improve gt.  Standing heel raises on calf board L2 incline x 20  Step ups 4" x 20    PATIENT EDUCATION AND HOME EXERCISES     Home Exercises Provided and Patient Education Provided     Education provided:   - Educated pt on the importance of daily stretch to increase the benefit of therapy and positive results.      Written Home Exercises Provided: Patient instructed to cont prior HEP. Exercises were reviewed and Santino was able to demonstrate them prior to the end of the session.  Santino demonstrated good  understanding of the education provided. See EMR under Patient Instructions for exercises provided during therapy sessions    ASSESSMENT   Santino returned reporting only min R knee upon arrival for treatment.  Treatment continued with LE strengthening exercises and balance challenges. Pt continues to require CGA " and demonstrated increased postural sway with static balance challenges.  Step was decreased to 4 inches on step ups as pt demonstrated increase postural lean and compensations with 6 inch step. All other exercises were tolerated well with no adverse effects. Will continue to progress per pt's tolerance.    Santino Is progressing well towards his goals.   Pt prognosis is Good.     Pt will continue to benefit from skilled outpatient physical therapy to address the deficits listed in the problem list box on initial evaluation, provide pt/family education and to maximize pt's level of independence in the home and community environment.     Pt's spiritual, cultural and educational needs considered and pt agreeable to plan of care and goals.     Anticipated barriers to physical therapy: none    Goals:  Short Term Goals: 4 weeks   -right knee AROM 0-120 deg for improving function.  -right hip and knee grossly 4-/5 for improving fx.  -Pt (I) in bed mobility without difficulty.  -Dec edema at knee by 1 cm to assist with strength and ROM.  -FOTO impairment score 50 for improving fx.  -Pt will have normal gt.  -SLR without knee lag in extension.     Long Term Goals: 8 weeks   -LE strength as tested grossly 4/5 or greater.  -FOTO impairment score 50 for improving fx.  -Pt will be (I) in maintenance program.     PLAN     Plan of care Certification: 8/25/2023 to 10/20/2023.     Outpatient Physical Therapy 2 times weekly for 8 weeks to include the following interventions: Aquatic Therapy, Electrical Stimulation IFC, Gait Training, Manual Therapy, Moist Heat/ Ice, Neuromuscular Re-ed, Patient Education, Self Care, Therapeutic Activities, Therapeutic Exercise, and Ultrasound.        Delbert De, PTA

## 2023-09-30 DIAGNOSIS — M62.838 MUSCLE SPASM: ICD-10-CM

## 2023-09-30 NOTE — TELEPHONE ENCOUNTER
No care due was identified.  [facility] Embedded Care Due Messages. Reference number: [id number].   9/30/2023 1:18:01 PM CDT

## 2023-10-02 ENCOUNTER — CLINICAL SUPPORT (OUTPATIENT)
Dept: REHABILITATION | Facility: HOSPITAL | Age: 73
End: 2023-10-02
Payer: MEDICARE

## 2023-10-02 DIAGNOSIS — R29.898 WEAKNESS OF BOTH LOWER EXTREMITIES: ICD-10-CM

## 2023-10-02 DIAGNOSIS — R26.89 IMPAIRED GAIT AND MOBILITY: ICD-10-CM

## 2023-10-02 DIAGNOSIS — M25.661 IMPAIRED RANGE OF MOTION OF RIGHT KNEE: Primary | ICD-10-CM

## 2023-10-02 PROCEDURE — 97110 THERAPEUTIC EXERCISES: CPT | Mod: PO,CQ

## 2023-10-02 PROCEDURE — 97112 NEUROMUSCULAR REEDUCATION: CPT | Mod: PO,CQ

## 2023-10-02 RX ORDER — TIZANIDINE 4 MG/1
4 TABLET ORAL EVERY 8 HOURS
Qty: 30 TABLET | Refills: 0 | Status: SHIPPED | OUTPATIENT
Start: 2023-10-02 | End: 2023-10-16

## 2023-10-02 NOTE — PROGRESS NOTES
"  [facility]   Physical Therapy Treatment Note     Name: Dayton Faye  Clinic Number: [id number]    Therapy Diagnosis:   Encounter Diagnoses   Name Primary?    Impaired range of motion of right knee Yes    Weakness of both lower extremities     Impaired gait and mobility        Physician: Issa Merida, VIN    Visit Date: 10/2/2023    Physician: Fili Scott,*     Physician Orders: PT Eval and Treat   Post Surgical? Yes Eval and Treat Yes Type of Therapy Outpatient Therapy   Medical Diagnosis from Referral: Z96.651 (ICD-10-CM) - Status post total knee replacement using cement, right   Evaluation Date: 8/25/2023  Authorization Period Expiration: 8/20/2024  Plan of Care Expiration: 10/20/2023  Progress Note Due: 9/24/2023  Visit # / Visits authorized: 18/20 (12 for this referral)  FOTO: 1/ 3     Precautions: Standard, Diabetes, and HTN       Time In: 4:00 pm  Time Out: 4:55 pm  Total Billable Time: 55 minutes 27 mins 1:1    SUBJECTIVE     Pt reports: no R knee pain currently. He states "I was surprised I didn't get any cramps in my legs after last visit, because I normally do".    He was compliant with home exercise program.  Response to previous treatment: No change   Functional change: able to flex knee enough to put soaks and shoes on     Pain: 0/10  Location: right below knee      OBJECTIVE     Objective Measures updated at progress report unless specified.     9/26/23  Knee Left active Left Passive Right Active R passive   Flexion 120 Nt d/t 120 110    Extension 0 NT d/t 0 0 0      TREATMENT     De received the treatments listed below:      therapeutic activities to improve functional performance for 00 minutes, including:      received therapeutic exercises to develop strength and ROM for 35 minutes including:  Stat bike seat 14 8 mins  Calf stretch on wedge 2 x 1 mins  HSC 70# 3 x 10  Leg extension 15# SINGLE LEG 2 x 10  Leg extension DOUBLE LEG 25# 2 x 10  Leg " "press DOUBLE LEG 40# 3 x 10  Leg press SINGLE LEG 20# 3 x 10    Not performed:  Standing at stairs knee extension stretch x 2 mins  Standing at stairs knee flexion stretch x 2 mins  SLR with QS 2 x 20  Heel slides 10 x 15"  Sit<>stand 10x  Standing hip 2 x 10:  Abduction  Extension  March  Standing 2 x 20:  HSC  Mini squat 2 x 10    neuromuscular re-education activities to improve: Sense and Proprioception for 15 minutes. The following activities were included:  LONG SITTING QS   3 X 10, 5 S HOLDS-NP  LAQ 3 x 8 with ball for VMO and 3#  Side lying clam 2 x 10 (Vcs and Tcs for glute recruitment and avoid spinal rotation)-NP  TKE with hip extended in standing w/ GTB 2 x 10  SLS R LE 3x 30 sec on floor and bilateral  Tandem stance 3 x 30"    received the following manual therapy techniques: Soft tissue Mobilization were applied to the:  for 00 minutes, including:  Superior and inf patellar mobs grade III and tibial INTERNAL ROTATION gd II for knee ROM    Gait training in // bars x 00 mins.  Heel strike-foot flat/quad and glute recruitment, 5 laps in // bars, focus on Left LE stance    Therapeutic Activities x 10 mins to improve gt.  Standing heel raises on calf board L2 incline x 20  Step ups 4" x 20    PATIENT EDUCATION AND HOME EXERCISES     Home Exercises Provided and Patient Education Provided     Education provided:   - Educated pt on the importance of daily stretch to increase the benefit of therapy and positive results.      Written Home Exercises Provided: Patient instructed to cont prior HEP. Exercises were reviewed and Santino was able to demonstrate them prior to the end of the session.  Santino demonstrated good  understanding of the education provided. See EMR under Patient Instructions for exercises provided during therapy sessions    ASSESSMENT   Santino returned reporting no R knee upon arrival for treatment.  Treatment continued with LE strengthening exercises and balance challenges, progressing by " increasing workload on on Precor LE strengthening machines. Pt continues to demonstrate postural sway with static balance challenges but required decreased UE support.  Treatment was tolerated well with no adverse effects. Will continue to progress per pt's tolerance.    Santino Is progressing well towards his goals.   Pt prognosis is Good.     Pt will continue to benefit from skilled outpatient physical therapy to address the deficits listed in the problem list box on initial evaluation, provide pt/family education and to maximize pt's level of independence in the home and community environment.     Pt's spiritual, cultural and educational needs considered and pt agreeable to plan of care and goals.     Anticipated barriers to physical therapy: none    Goals:  Short Term Goals: 4 weeks   -right knee AROM 0-120 deg for improving function.  -right hip and knee grossly 4-/5 for improving fx.  -Pt (I) in bed mobility without difficulty.  -Dec edema at knee by 1 cm to assist with strength and ROM.  -FOTO impairment score 50 for improving fx.  -Pt will have normal gt.  -SLR without knee lag in extension.     Long Term Goals: 8 weeks   -LE strength as tested grossly 4/5 or greater.  -FOTO impairment score 50 for improving fx.  -Pt will be (I) in maintenance program.     PLAN     Plan of care Certification: 8/25/2023 to 10/20/2023.     Outpatient Physical Therapy 2 times weekly for 8 weeks to include the following interventions: Aquatic Therapy, Electrical Stimulation IFC, Gait Training, Manual Therapy, Moist Heat/ Ice, Neuromuscular Re-ed, Patient Education, Self Care, Therapeutic Activities, Therapeutic Exercise, and Ultrasound.        Delbert De, PTA

## 2023-10-04 ENCOUNTER — CLINICAL SUPPORT (OUTPATIENT)
Dept: REHABILITATION | Facility: HOSPITAL | Age: 73
End: 2023-10-04
Payer: MEDICARE

## 2023-10-04 DIAGNOSIS — M25.661 IMPAIRED RANGE OF MOTION OF RIGHT KNEE: Primary | ICD-10-CM

## 2023-10-04 DIAGNOSIS — R29.898 WEAKNESS OF BOTH LOWER EXTREMITIES: ICD-10-CM

## 2023-10-04 DIAGNOSIS — R26.89 IMPAIRED GAIT AND MOBILITY: ICD-10-CM

## 2023-10-04 PROCEDURE — 97110 THERAPEUTIC EXERCISES: CPT | Mod: PO,CQ

## 2023-10-04 PROCEDURE — 97112 NEUROMUSCULAR REEDUCATION: CPT | Mod: PO,CQ

## 2023-10-04 PROCEDURE — 97530 THERAPEUTIC ACTIVITIES: CPT | Mod: PO,CQ

## 2023-10-04 NOTE — PROGRESS NOTES
[facility]   Physical Therapy Treatment Note     Name: Dayton Faye  Clinic Number: [id number]    Therapy Diagnosis:   Encounter Diagnoses   Name Primary?    Impaired range of motion of right knee Yes    Weakness of both lower extremities     Impaired gait and mobility        Physician: Issa Merida, VIN    Visit Date: 10/4/2023    Physician: Fili Scott,*     Physician Orders: PT Eval and Treat   Post Surgical? Yes Eval and Treat Yes Type of Therapy Outpatient Therapy   Medical Diagnosis from Referral: Z96.651 (ICD-10-CM) - Status post total knee replacement using cement, right   Evaluation Date: 8/25/2023  Authorization Period Expiration: 8/20/2024  Plan of Care Expiration: 10/20/2023  Progress Note Due: 9/24/2023  Visit # / Visits authorized: 19/20 (13 for this referral)  FOTO: 2/ 3     Precautions: Standard, Diabetes, and HTN       Time In: 3:00 pm  Time Out: 3:55 pm  Total Billable Time: 55 minutes   SUBJECTIVE     Pt reports: no R knee pain currently and no increase in muscular soreness following exercise progressions last visit.    He was compliant with home exercise program.  Response to previous treatment: No adverse effects  Functional change: able to flex knee enough to put socks and shoes on     Pain: 0/10  Location: right below knee      OBJECTIVE     Objective Measures updated at progress report unless specified.     10/04/23  Knee Left active Left Passive Right Active R passive   Flexion 120 Nt d/t 120 120 AA 1   Extension 0 NT d/t 0 0 0      TREATMENT     De received the treatments listed below:      therapeutic exercises to develop strength and ROM for 30 minutes including:  Stat bike seat 14 10 mins  Calf stretch on wedge 2 x 1 mins  HSC 70# 3 x 10  Leg extension 15# SINGLE LEG 2 x 10  Leg extension DOUBLE LEG 25# 2 x 10  Leg press DOUBLE LEG 40# 3 x 10  Leg press SINGLE LEG 20# 3 x 10    Not performed:  Standing at stairs knee extension stretch x 2  "mins  Standing at stairs knee flexion stretch x 2 mins  SLR with QS 2 x 20  Heel slides 10 x 15"  Sit<>stand 10x  Standing hip 2 x 10:  Abduction  Extension  March  Standing 2 x 20:  HSC  Mini squat 2 x 10    neuromuscular re-education activities to improve: Sense and Proprioception for 10 minutes. The following activities were included:  TKE with hip extended in standing w/ GTB 2 x 10  SLS R LE 3x 30 sec On airex and bilateral  Tandem stance On airex 3 x 30" ea    LONG SITTING QS   3 X 10, 5 S HOLDS-NP  LAQ 3 x 8 with ball for VMO and 3#-NP  Side lying clam 2 x 10 (Vcs and Tcs for glute recruitment and avoid spinal rotation)-NP    received the following manual therapy techniques: Soft tissue Mobilization were applied to the:  for 00 minutes, including:  Superior and inf patellar mobs grade III and tibial INTERNAL ROTATION gd II for knee ROM    Gait training in // bars x 00 mins.  Heel strike-foot flat/quad and glute recruitment, 5 laps in // bars, focus on Left LE stance    Therapeutic Activities x 15 mins to improve gt.  Standing heel raises on calf board L2 incline x 20  Step ups 4" x 20  +Lateral step downs 4" step x15    PATIENT EDUCATION AND HOME EXERCISES     Home Exercises Provided and Patient Education Provided     Education provided:   - Educated pt on the importance of daily stretch to increase the benefit of therapy and positive results.      Written Home Exercises Provided: Patient instructed to cont prior HEP. Exercises were reviewed and Santino was able to demonstrate them prior to the end of the session.  Santino demonstrated good  understanding of the education provided. See EMR under Patient Instructions for exercises provided during therapy sessions    ASSESSMENT   Santino returned reporting no R knee upon arrival for treatment.  Treatment continued with LE strengthening exercises and balance challenges, progressing by adding airex to static balance challenges and introducing lateral step downs to " increase challenge to ankle/hip balance strategies and increase emphasis on eccentric control of quad respectively. He tolerated exercise progressions well with no increase in pain and appropriate increase in muscular fatigue. Will continue to progress per pt's tolerance    Santino Is progressing well towards his goals.   Pt prognosis is Good.     Pt will continue to benefit from skilled outpatient physical therapy to address the deficits listed in the problem list box on initial evaluation, provide pt/family education and to maximize pt's level of independence in the home and community environment.     Pt's spiritual, cultural and educational needs considered and pt agreeable to plan of care and goals.     Anticipated barriers to physical therapy: none    Goals:  Short Term Goals: 4 weeks   -right knee AROM 0-120 deg for improving function.  -right hip and knee grossly 4-/5 for improving fx.  -Pt (I) in bed mobility without difficulty.  -Dec edema at knee by 1 cm to assist with strength and ROM.  -FOTO impairment score 50 for improving fx.  -Pt will have normal gt.  -SLR without knee lag in extension.     Long Term Goals: 8 weeks   -LE strength as tested grossly 4/5 or greater.  -FOTO impairment score 50 for improving fx.  -Pt will be (I) in maintenance program.     PLAN     Plan of care Certification: 8/25/2023 to 10/20/2023.     Outpatient Physical Therapy 2 times weekly for 8 weeks to include the following interventions: Aquatic Therapy, Electrical Stimulation IFC, Gait Training, Manual Therapy, Moist Heat/ Ice, Neuromuscular Re-ed, Patient Education, Self Care, Therapeutic Activities, Therapeutic Exercise, and Ultrasound.        Delbert De, PTA

## 2023-10-09 ENCOUNTER — CLINICAL SUPPORT (OUTPATIENT)
Dept: REHABILITATION | Facility: HOSPITAL | Age: 73
End: 2023-10-09
Payer: MEDICARE

## 2023-10-09 DIAGNOSIS — M25.661 IMPAIRED RANGE OF MOTION OF RIGHT KNEE: Primary | ICD-10-CM

## 2023-10-09 DIAGNOSIS — R29.898 WEAKNESS OF BOTH LOWER EXTREMITIES: ICD-10-CM

## 2023-10-09 DIAGNOSIS — R26.89 IMPAIRED GAIT AND MOBILITY: ICD-10-CM

## 2023-10-09 PROCEDURE — 97110 THERAPEUTIC EXERCISES: CPT | Mod: PO,CQ

## 2023-10-09 PROCEDURE — 97112 NEUROMUSCULAR REEDUCATION: CPT | Mod: PO,CQ

## 2023-10-09 PROCEDURE — 97530 THERAPEUTIC ACTIVITIES: CPT | Mod: PO,CQ

## 2023-10-09 NOTE — PROGRESS NOTES
[facility]   Physical Therapy Treatment Note     Name: Dayton Faye  Clinic Number: [id number]    Therapy Diagnosis:   Encounter Diagnoses   Name Primary?    Impaired range of motion of right knee Yes    Weakness of both lower extremities     Impaired gait and mobility        Physician: Issa Merida, VIN    Visit Date: 10/9/2023    Physician: Fili Scott,*     Physician Orders: PT Eval and Treat   Post Surgical? Yes Eval and Treat Yes Type of Therapy Outpatient Therapy   Medical Diagnosis from Referral: Z96.651 (ICD-10-CM) - Status post total knee replacement using cement, right   Evaluation Date: 8/25/2023  Authorization Period Expiration: 8/20/2024  Plan of Care Expiration: 10/20/2023  Progress Note Due: 9/24/2023 (pt will be reassessed by PT NV)  Visit # / Visits authorized: 19/20 (13 for this referral)  FOTO: 2/ 3     Precautions: Standard, Diabetes, and HTN       Time In: 2:00 pm  Time Out: 2:55 pm  Total Billable Time: 55 minutes   SUBJECTIVE     Pt reports: no R knee pain currently and no increase in muscular soreness following exercise progressions last visit.    He was compliant with home exercise program.  Response to previous treatment: No adverse effects  Functional change: able to flex knee enough to put socks and shoes on     Pain: 0/10  Location: right below knee      OBJECTIVE     Objective Measures updated at progress report unless specified.     10/04/23  Knee Left active Left Passive Right Active R passive   Flexion 120 Nt d/t 120 120 AA 1   Extension 0 NT d/t 0 0 0      TREATMENT     De received the treatments listed below:      therapeutic exercises to develop strength and ROM for 30 minutes including:  Stat bike seat 14 10 mins  Calf stretch on wedge 2 x 1 mins  HSC 70# 3 x 10  Leg extension 20# SINGLE LEG 2 x 10  Leg extension DOUBLE LEG 25# 2 x 10-NP  Leg press DOUBLE LEG 60# 3 x 10  Leg press SINGLE LEG 30# 3 x 10  +Hip matrix abd 25#  "2x10    Not performed:  Standing at stairs knee extension stretch x 2 mins  Standing at stairs knee flexion stretch x 2 mins  SLR with QS 2 x 20  Heel slides 10 x 15"  Sit<>stand 10x  Standing hip 2 x 10:  Abduction  Extension  March  Standing 2 x 20:  HSC  Mini squat 2 x 10    neuromuscular re-education activities to improve: Sense and Proprioception for 10 minutes. The following activities were included:  TKE with hip extended in standing w/ GTB 2 x 10  SLS R LE 3x 30 sec On airex and bilateral  Tandem stance On airex 3 x 30" ea    LONG SITTING QS   3 X 10, 5 S HOLDS-NP  LAQ 3 x 8 with ball for VMO and 3#-NP  Side lying clam 2 x 10 (Vcs and Tcs for glute recruitment and avoid spinal rotation)-NP    received the following manual therapy techniques: Soft tissue Mobilization were applied to the:  for 00 minutes, including:  Superior and inf patellar mobs grade III and tibial INTERNAL ROTATION gd II for knee ROM    Gait training in // bars x 00 mins.  Heel strike-foot flat/quad and glute recruitment, 5 laps in // bars, focus on Left LE stance    Therapeutic Activities x 15 mins to improve gt.  Standing heel raises on calf board L2 incline x 20  Step ups 6" x 20  Lateral step downs 4" step x15    PATIENT EDUCATION AND HOME EXERCISES     Home Exercises Provided and Patient Education Provided     Education provided:   - Educated pt on the importance of daily stretch to increase the benefit of therapy and positive results.      Written Home Exercises Provided: Patient instructed to cont prior HEP. Exercises were reviewed and Santino was able to demonstrate them prior to the end of the session.  Santino demonstrated good  understanding of the education provided. See EMR under Patient Instructions for exercises provided during therapy sessions    ASSESSMENT   Santino returned reporting no R knee upon arrival for treatment.  Treatment continued with LE strengthening exercises and balance challenges, progressing by increasing " resistance on leg press and single ext machines. Hip matrix abduction machine was also introduced. He tolerated exercise progressions well with no increase in pain and appropriate increase in muscular fatigue. Pt required UE support and demonstrated postural sway with static balance challenges on airex. Will continue to progress per pt's tolerance    Santino Is progressing well towards his goals.   Pt prognosis is Good.     Pt will continue to benefit from skilled outpatient physical therapy to address the deficits listed in the problem list box on initial evaluation, provide pt/family education and to maximize pt's level of independence in the home and community environment.     Pt's spiritual, cultural and educational needs considered and pt agreeable to plan of care and goals.     Anticipated barriers to physical therapy: none    Goals:  Short Term Goals: 4 weeks   -right knee AROM 0-120 deg for improving function.  -right hip and knee grossly 4-/5 for improving fx.  -Pt (I) in bed mobility without difficulty.  -Dec edema at knee by 1 cm to assist with strength and ROM.  -FOTO impairment score 50 for improving fx.  -Pt will have normal gt.  -SLR without knee lag in extension.     Long Term Goals: 8 weeks   -LE strength as tested grossly 4/5 or greater.  -FOTO impairment score 50 for improving fx.  -Pt will be (I) in maintenance program.     PLAN     Plan of care Certification: 8/25/2023 to 10/20/2023.     Outpatient Physical Therapy 2 times weekly for 8 weeks to include the following interventions: Aquatic Therapy, Electrical Stimulation IFC, Gait Training, Manual Therapy, Moist Heat/ Ice, Neuromuscular Re-ed, Patient Education, Self Care, Therapeutic Activities, Therapeutic Exercise, and Ultrasound.        Delbert De, PTA

## 2023-10-11 ENCOUNTER — CLINICAL SUPPORT (OUTPATIENT)
Dept: REHABILITATION | Facility: HOSPITAL | Age: 73
End: 2023-10-11
Payer: MEDICARE

## 2023-10-11 DIAGNOSIS — R26.89 IMPAIRED GAIT AND MOBILITY: ICD-10-CM

## 2023-10-11 DIAGNOSIS — M25.661 IMPAIRED RANGE OF MOTION OF RIGHT KNEE: Primary | ICD-10-CM

## 2023-10-11 DIAGNOSIS — R29.898 WEAKNESS OF BOTH LOWER EXTREMITIES: ICD-10-CM

## 2023-10-11 PROCEDURE — 97110 THERAPEUTIC EXERCISES: CPT | Mod: PO | Performed by: PHYSICAL THERAPIST

## 2023-10-11 NOTE — PROGRESS NOTES
" [facility]   Reassessment: Physical Therapy Treatment Note     Name: Dayton Faye  Clinic Number: [id number]    Therapy Diagnosis:   Encounter Diagnoses   Name Primary?    Impaired range of motion of right knee Yes    Weakness of both lower extremities     Impaired gait and mobility      Physician: Issa Merida DPM    Visit Date: 10/11/2023    Physician: Fili Scott,*     Physician Orders: PT Eval and Treat   Post Surgical? Yes Eval and Treat Yes Type of Therapy Outpatient Therapy   Medical Diagnosis from Referral: Z96.651 (ICD-10-CM) - Status post total knee replacement using cement, right   Evaluation Date: 8/25/2023  Authorization Period Expiration: 8/20/2024, 12/31/2023  Plan of Care Expiration: 10/20/2023  Progress Note Due: 9/24/2023,    Visit # / Visits authorized: 20/20 (14 for this referral)  FOTO: 2/ 3     Precautions: Standard, Diabetes, and HTN       Time In: 10:00 AM  Time Out: 11:00 AM  Total Billable Time: 30 minutes (1:1)    SUBJECTIVE     Pt reports: no R knee pain currently and no increase in muscular soreness following exercise progressions last visit. He is having contralateral Left hip and Left ankle pain, ROM, and strength deficits. Left hip pain is really indicated more at his back. He has a hx of 3-5 fusion 2016. He rec'd PT which helped. He had a right hip replacement. Right LE was impacted by the back pain prior to back surgery. Left "hip" was hurting, but after repeated standing extension, his pain was centered to his Left sided back and out of the original posterior buttock     He was partially compliant with home exercise program.  Response to previous treatment: No adverse effects  Functional change: able to flex knee enough to put socks and shoes on     Pain: 0/10  Location: right below knee      OBJECTIVE     Objective Measures updated at progress report unless specified.     10/11/1023:  Observation: Pt is ambulating well with Hurrycane and " proper gt pattern. Walking without cane is largely limited by Left sided back, hip, and ankle/foot pain, weakness, and stiffness.     Posture: fair     Range of Motion:   Right -2 to 115 deg consistently    Lumbar spinal extension in standing limited to 10% but improving with repeated standing extension x 20. Pt reporting more back specific pain rather than back to upper buttock pain post testing. Pt instructed to cont standing lumbar extension at counter top hourly x 10 or 5-x/day.    Lower Extremity Strength  Right LE   Left LE     Knee extension: 4+/5 Knee extension: 4+/5   Knee flexion: 4/5 Knee flexion: 4/5   Hip flexion: 4/5 Hip flexion: 4/5   Hip extension:  3+/5 Here down nt Hip extension: 4/5   Hip abduction: 3-/5 Hip abduction: 4-/5   Hip adduction: 3+/5 Hip adduction 4/5   Ankle dorsiflexion: 5/5 Ankle dorsiflexion: 4+/5   Ankle plantarflexion: 5/5 Ankle plantarflexion: 4-/5 (hx foot problem and calf atrophy noted      Function:  - Sit <--> Stand: modified (I) using UE for assistance and ease, but hands not needed. s/p right TKA 8/8/2023   - Bed Mobility: modified (I) and improved     Joint Mobility: WNL for s/p 8 weeks     Palpation: na     Sensation: decreased Left lower lateral knee and patella s/p nerve block (improving)     Intake Outcome Measure for FOTO KNEE Survey     Therapist reviewed FOTO scores for Dayton Faye on 8/25/2023.   FOTO report - see Media section or FOTO account episode details.     Intake Score: 40  Goal: 63  Next visit.        TREATMENT     De received the treatments listed below:      therapeutic exercises to develop strength and ROM for 50 minutes including:  Stat bike seat 14 10 mins  Calf stretch on wedge 2 x 1 mins  HSC 70# 3 x 10  Leg extension 20# SINGLE LEG 2 x 10  Leg extension DOUBLE LEG 25# 2 x 10-NP  Leg press DOUBLE LEG 60# 3 x 10  Leg press SINGLE LEG 30# 3 x 10  Reassessment/back screening: See above results.    Not performed:  Hip matrix abd 25# 2x10  Standing at  "stairs knee extension stretch x 2 mins  SLR with QS 2 x 20  Heel slides 10 x 15"  Sit<>stand 10x  Standing hip 2 x 10:  Abduction  Extension  March  Standing 2 x 20:  HSC  Mini squat 2 x 10    neuromuscular re-education activities to improve: Sense and Proprioception for 00 minutes. The following activities were included:  TKE with hip extended in standing w/ GTB 2 x 10  SLS R LE 3x 30 sec On airex and bilateral  Tandem stance On airex 3 x 30" ea    LONG SITTING QS   3 X 10, 5 S HOLDS-NP  LAQ 3 x 8 with ball for VMO and 3#-NP  Side lying clam 2 x 10 (Vcs and Tcs for glute recruitment and avoid spinal rotation)-NP    received the following manual therapy techniques: Soft tissue Mobilization were applied to the:  for 00 minutes, including:  Superior and inf patellar mobs grade III and tibial INTERNAL ROTATION gd II for knee ROM    Gait training in // bars x 00 mins.  Heel strike-foot flat/quad and glute recruitment, 5 laps in // bars, focus on Left LE stance    Therapeutic Activities x 00 mins to improve gt.  Standing heel raises on calf board L2 incline x 20  Step ups 6" x 20  Lateral step downs 4" step x15    PATIENT EDUCATION AND HOME EXERCISES     Home Exercises Provided and Patient Education Provided     Education provided:   - Educated pt on the importance of daily stretch to increase the benefit of therapy and positive results.      Written Home Exercises Provided: Patient instructed to cont prior HEP. Exercises were reviewed and Santino was able to demonstrate them prior to the end of the session.  Santino demonstrated good  understanding of the education provided. See EMR under Patient Instructions for exercises provided during therapy sessions    ASSESSMENT   Santino returned reporting no R knee upon arrival for treatment.  He reported a new back and hip problem on left side which was determined to be coming from back. Pain was localized to L1-3 after back extension. This correlates to the L3 down fusion " patient reports a h/o. The upper levels are compensating for the lower fused levels. This will be loosely treated as needed to support his personal goal for walking without AD. Decreasing or eliminating the back and perceived hip pain should help improve his walking as pain improves. Santino is -2 to 115 deg right knee AROM which is acceptable given his home program performance and activity level. His right LE strength is equal to or greater than Left LE which has extensive h/o pain and weakness. Will cont reassessment next visit and make recommendations. MD howard/chung early next mo. Will continue to progress per pt's tolerance    Santino Is progressing well towards his goals.   Pt prognosis is Good.     Pt will continue to benefit from skilled outpatient physical therapy to address the deficits listed in the problem list box on initial evaluation, provide pt/family education and to maximize pt's level of independence in the home and community environment.     Pt's spiritual, cultural and educational needs considered and pt agreeable to plan of care and goals.     Anticipated barriers to physical therapy: none    Goals:  Short Term Goals: 4 weeks   -right knee AROM 0-120 deg for improving function.  -right hip and knee grossly 4-/5 for improving fx.  -Pt (I) in bed mobility without difficulty.  -Dec edema at knee by 1 cm to assist with strength and ROM.  -FOTO impairment score 50 for improving fx.  -Pt will have normal gt.  -SLR without knee lag in extension.     Long Term Goals: 8 weeks   -LE strength as tested grossly 4/5 or greater.  -FOTO impairment score 50 for improving fx.  -Pt will be (I) in maintenance program.     PLAN     Plan of care Certification: 8/25/2023 to 10/20/2023.     Outpatient Physical Therapy 2 times weekly for 8 weeks to include the following interventions: Aquatic Therapy, Electrical Stimulation IFC, Gait Training, Manual Therapy, Moist Heat/ Ice, Neuromuscular Re-ed, Patient Education, Self Care,  Therapeutic Activities, Therapeutic Exercise, and Ultrasound.        Umu Diallo, PT

## 2023-10-15 DIAGNOSIS — M62.838 MUSCLE SPASM: ICD-10-CM

## 2023-10-15 NOTE — TELEPHONE ENCOUNTER
No care due was identified.  [facility] Embedded Care Due Messages. Reference number: [id number].   10/15/2023 7:31:16 AM CDT

## 2023-10-16 ENCOUNTER — CLINICAL SUPPORT (OUTPATIENT)
Dept: REHABILITATION | Facility: HOSPITAL | Age: 73
End: 2023-10-16
Payer: MEDICARE

## 2023-10-16 DIAGNOSIS — R26.89 IMPAIRED GAIT AND MOBILITY: ICD-10-CM

## 2023-10-16 DIAGNOSIS — M25.661 IMPAIRED RANGE OF MOTION OF RIGHT KNEE: Primary | ICD-10-CM

## 2023-10-16 DIAGNOSIS — R29.898 WEAKNESS OF BOTH LOWER EXTREMITIES: ICD-10-CM

## 2023-10-16 PROCEDURE — 97110 THERAPEUTIC EXERCISES: CPT | Mod: PO | Performed by: PHYSICAL THERAPIST

## 2023-10-16 PROCEDURE — 97116 GAIT TRAINING THERAPY: CPT | Mod: PO | Performed by: PHYSICAL THERAPIST

## 2023-10-16 RX ORDER — TIZANIDINE 4 MG/1
4 TABLET ORAL EVERY 8 HOURS
Qty: 30 TABLET | Refills: 0 | Status: SHIPPED | OUTPATIENT
Start: 2023-10-16 | End: 2023-11-27

## 2023-10-16 NOTE — PROGRESS NOTES
[facility]   Reassessment: Physical Therapy Treatment Note     Name: Dayton Faye  Clinic Number: [id number]    Therapy Diagnosis:   Encounter Diagnoses   Name Primary?    Impaired range of motion of right knee Yes    Weakness of both lower extremities     Impaired gait and mobility      Physician: Fili Scott MD    Visit Date: 10/16/2023    Physician: Fili Scott,*     Physician Orders: PT Eval and Treat   Post Surgical? Yes Eval and Treat Yes Type of Therapy Outpatient Therapy   Medical Diagnosis from Referral: Z96.651 (ICD-10-CM) - Status post total knee replacement using cement, right   Evaluation Date: 8/25/2023  Authorization Period Expiration: 8/20/2024, 12/31/2023  Plan of Care Expiration: 10/20/2023, 11/17/2023  Progress Note Due: 9/24/2023, 10/23/2023, 11/16/2023   Visit # / Visits authorized: 20/20 (15 for this referral)  FOTO: 2/ 3     Precautions: Standard, Diabetes, and HTN       Time In: 9:00 AM  Time Out: 10:00 AM  Total Billable Time: 56 minutes (1:1)    SUBJECTIVE     Pt reports: buttock pain is better, but back pain is present as to be expected. He feels more stability walking than before.    He was partially compliant with home exercise program.  Response to previous treatment: No adverse effects  Functional change: able to flex knee enough to put socks and shoes on     Pain: 3/10  Location: right below knee      OBJECTIVE     Objective Measures updated at progress report unless specified.     10/11/1023 and 10/16/2023:  Observation: Pt is ambulating well with Hurrycane and proper gt pattern. Walking without cane is largely limited by Left sided back, hip, and ankle/foot pain, weakness, and stiffness.     Posture: fair     Range of Motion:   Right -2 to 115 deg consistently    Lumbar spinal extension in standing limited to 10% but improving with repeated standing extension x 20. Pt reporting more back specific pain rather than back to upper buttock pain  Pt tolerated 16oz of water. Pt was able to ambulate to restroom with no complaints. Unable to produce urine. Denies any dizziness during ambulation.    "post testing. Pt instructed to cont standing lumbar extension at counter top hourly x 10 or 5-x/day.    Lower Extremity Strength  Right LE   Left LE     Knee extension: 4+/5 Knee extension: 4+/5   Knee flexion: 4/5 Knee flexion: 4/5   Hip flexion: 4/5 Hip flexion: 4/5   Hip extension:  4-/5  Hip extension: 4/5   Hip abduction: 3+/5 Hip abduction: 4-/5   Hip adduction: 4-/5 Hip adduction 4/5   Ankle dorsiflexion: 5/5 Ankle dorsiflexion: 4+/5   Ankle plantarflexion: 5/5 Ankle plantarflexion: 4-/5 (hx foot problem and calf atrophy noted      Function:  - Sit <--> Stand: modified (I) using UE for assistance and ease, but hands not needed. s/p right TKA 8/8/2023   - Bed Mobility: modified (I) and improved     Joint Mobility: WNL for s/p 8 weeks     Intake Outcome Measure for FOTO KNEE Survey     Therapist reviewed FOTO scores for Dayton Faye on 8/25/2023.   FOTO report - see Media section or FOTO account episode details.     Intake Score: 40  Goal: 63  Next visit.        TREATMENT     De received the treatments listed below:      therapeutic exercises to develop strength and ROM for 50 minutes including:  Stat bike seat 14 10 mins  Calf stretch on wedge 2 x 1 mins  HSC 70# 3 x 10  Leg extension 20# SINGLE LEG 2 x 10  Leg extension DOUBLE LEG 25# 2 x 10-NP  Leg press DOUBLE LEG 60# 3 x 10  Leg press SINGLE LEG 30# 3 x 10  Reassessment completion. See above results.    Not performed:  Hip matrix abd 25# 2x10  Standing at stairs knee extension stretch x 2 mins  SLR with QS 2 x 20  Heel slides 10 x 15"  Sit<>stand 10x  Standing hip 2 x 10:  Abduction  Extension  March  Standing 2 x 20:  HSC  Mini squat 2 x 10    neuromuscular re-education activities to improve: Sense and Proprioception for 00 minutes. The following activities were included:  TKE with hip extended in standing w/ GTB 2 x 10  SLS R LE 3x 30 sec On airex and bilateral  Tandem stance On airex 3 x 30" ea    LONG SITTING QS   3 X 10, 5 S HOLDS-NP  LAQ 3 x 8 with " "ball for VMO and 3#-NP  Side lying clam 2 x 10 (Vcs and Tcs for glute recruitment and avoid spinal rotation)-NP    received the following manual therapy techniques: Soft tissue Mobilization were applied to the:  for 00 minutes, including:  Superior and inf patellar mobs grade III and tibial INTERNAL ROTATION gd II for knee ROM    Gait training in // bars x 10 mins.  Heel strike-foot flat/quad and glute recruitment, without AD and with gt belt    Therapeutic Activities x 00 mins to improve gt.  Standing heel raises on calf board L2 incline x 20  Step ups 6" x 20  Lateral step downs 4" step x15    PATIENT EDUCATION AND HOME EXERCISES     Home Exercises Provided and Patient Education Provided     Education provided:   - Educated pt on the importance of daily stretch to increase the benefit of therapy and positive results.      Written Home Exercises Provided: Patient instructed to cont prior HEP. Exercises were reviewed and Santino was able to demonstrate them prior to the end of the session.  Santino demonstrated good  understanding of the education provided. See EMR under Patient Instructions for exercises provided during therapy sessions    ASSESSMENT   Santino returned reporting R knee upon arrival for treatment from prior tx on the machines. No more hip pain, but back pain is now apparent. Back pain is not too bad. Santino is -2 to 115 deg right knee AROM which is acceptable given his home program performance and activity level. His right LE strength is equal to or greater than Left LE with exception of a few hip motions. Pt is walking more stable in clinic without AD, and he will continue this at home. He is to cont with cane in community at this time. 2 goals met. Pt may benefit from additional strengthening and gt training towards goal of removing AD completely. MD f/chung early next mo. Will continue to progress per pt's tolerance    Santino Is progressing well towards his goals.   Pt prognosis is Good.     Pt will " continue to benefit from skilled outpatient physical therapy to address the deficits listed in the problem list box on initial evaluation, provide pt/family education and to maximize pt's level of independence in the home and community environment.     Pt's spiritual, cultural and educational needs considered and pt agreeable to plan of care and goals.     Anticipated barriers to physical therapy: none    Goals:  Short Term Goals: 4 weeks   -right knee AROM 0-120 deg for improving function.-not met  -right hip and knee grossly 4-/5 for improving fx.-ongoing  -Pt (I) in bed mobility without difficulty.-MET  -Dec edema at knee by 1 cm to assist with strength and ROM.  -FOTO impairment score 50 for improving fx.  -Pt will have normal gt.-ongoing  -SLR without knee lag in extension.-MET     Long Term Goals: 8 weeks   -LE strength as tested grossly 4/5 or greater.-ongoing  -FOTO impairment score 72 for improving fx.  -Pt will be (I) in maintenance program.     PLAN     Plan of care Certification: 10/20/2023 to 11/17/2023.     Outpatient Physical Therapy 1-2 times weekly for 4 weeks to include the following interventions: Aquatic Therapy, Electrical Stimulation IFC, Gait Training, Manual Therapy, Moist Heat/ Ice, Neuromuscular Re-ed, Patient Education, Self Care, Therapeutic Activities, Therapeutic Exercise, and Ultrasound.        Umu Diallo, PT

## 2023-10-17 NOTE — PLAN OF CARE
[facility]  Physical Therapy Plan of Care Note     Name: Dayton Faye  [facility] Number: [id number]    Therapy Diagnosis:   Encounter Diagnoses   Name Primary?    Impaired range of motion of right knee Yes    Weakness of both lower extremities     Impaired gait and mobility      Physician: Fili Scott MD    Visit Date: 10/16/2023    Physician Orders: PT Eval and Treat   Post Surgical? Yes Eval and Treat Yes Type of Therapy Outpatient Therapy   Medical Diagnosis from Referral: Z96.651 (ICD-10-CM) - Status post total knee replacement using cement, right   Evaluation Date: 8/25/2023  Authorization Period Expiration: 8/20/2024, 12/31/2023  Plan of Care Expiration: 10/20/2023, 11/17/2023  Progress Note Due: 9/24/2023, 10/23/2023, 11/16/2023   Visit # / Visits authorized: 20/20 (15 for this referral)  FOTO: 2/ 3     Precautions: Standard, Diabetes, and HTN      Functional Level Prior to Evaluation:  s/p right TKA, impaired gt, mobility    SUBJECTIVE     Update:   Pt reports: buttock pain is better, but back pain is present as to be expected. He feels more stability walking than before.     He was partially compliant with home exercise program.  Response to previous treatment: No adverse effects  Functional change: able to flex knee enough to put socks and shoes on      Pain: 3/10  Location: right below knee      OBJECTIVE     Update:   Objective Measures updated at progress report unless specified.      10/11/1023 and 10/16/2023:  Observation: Pt is ambulating well with Hurrycane and proper gt pattern. Walking without cane is largely limited by Left sided back, hip, and ankle/foot pain, weakness, and stiffness.     Posture: fair     Range of Motion:   Right -2 to 115 deg consistently     Lumbar spinal extension in standing limited to 10% but improving with repeated standing extension x 20. Pt reporting more back specific pain rather than back to upper buttock pain post testing. Pt instructed to  cont standing lumbar extension at counter top hourly x 10 or 5-x/day.     Lower Extremity Strength  Right LE   Left LE     Knee extension: 4+/5 Knee extension: 4+/5   Knee flexion: 4/5 Knee flexion: 4/5   Hip flexion: 4/5 Hip flexion: 4/5   Hip extension:  4-/5  Hip extension: 4/5   Hip abduction: 3+/5 Hip abduction: 4-/5   Hip adduction: 4-/5 Hip adduction 4/5   Ankle dorsiflexion: 5/5 Ankle dorsiflexion: 4+/5   Ankle plantarflexion: 5/5 Ankle plantarflexion: 4-/5 (hx foot problem and calf atrophy noted      Function:  - Sit <--> Stand: modified (I) using UE for assistance and ease, but hands not needed. s/p right TKA 8/8/2023   - Bed Mobility: modified (I) and improved     Joint Mobility: WNL for s/p 8 weeks     Intake Outcome Measure for FOTO KNEE Survey     Therapist reviewed FOTO scores for Dayton Faye on 8/25/2023.   FOTO report - see Media section or FOTO account episode details.     Intake Score: 40  Goal: 63  Next visit.         ASSESSMENT     Update:   Santino returned reporting R knee upon arrival for treatment from prior tx on the machines. No more hip pain, but back pain is now apparent. Back pain is not too bad. Santino is -2 to 115 deg right knee AROM which is acceptable given his home program performance and activity level. His right LE strength is equal to or greater than Left LE with exception of a few hip motions. Pt is walking more stable in clinic without AD, and he will continue this at home. He is to cont with cane in community at this time. 2 goals met. Pt may benefit from additional strengthening and gt training towards goal of removing AD completely. MD f/u early next mo. Will continue to progress per pt's tolerance     Santino Is progressing well towards his goals.   Pt prognosis is Good.      Pt will continue to benefit from skilled outpatient physical therapy to address the deficits listed in the problem list box on initial evaluation, provide pt/family education and to maximize pt's level of  independence in the home and community environment.      Pt's spiritual, cultural and educational needs considered and pt agreeable to plan of care and goals.     Anticipated barriers to physical therapy: none    Previous Short Term Goals Status:     Short Term Goals: 4 weeks   -right knee AROM 0-120 deg for improving function.-not met  -right hip and knee grossly 4-/5 for improving fx.-ongoing  -Pt (I) in bed mobility without difficulty.-MET  -Dec edema at knee by 1 cm to assist with strength and ROM.  -FOTO impairment score 50 for improving fx.  -Pt will have normal gt.-ongoing  -SLR without knee lag in extension.-MET  New Short Term Goals Status:   continue  Long Term Goal Status: continue per initial plan of care.  -LE strength as tested grossly 4/5 or greater.-ongoing  -FOTO impairment score 72 for improving fx.  -Pt will be (I) in maintenance program.  Reasons for Recertification of Therapy:   Continued LE weakness and difficulty walking    GOALS  Short Term Goals: 4 weeks   -right knee AROM 0-120 deg for improving function.-not met  -right hip and knee grossly 4-/5 for improving fx.-ongoing  -Pt (I) in bed mobility without difficulty.-MET  -Dec edema at knee by 1 cm to assist with strength and ROM.  -FOTO impairment score 50 for improving fx.  -Pt will have normal gt.-ongoing  -SLR without knee lag in extension.-MET     Long Term Goals: 8 weeks   -LE strength as tested grossly 4/5 or greater.-ongoing  -FOTO impairment score 72 for improving fx.  -Pt will be (I) in maintenance program.    PLAN     Updated Certification Period: 10/20/2023 to 11/17/2023  Recommended Treatment Plan: 1-2 times per week for 4 weeks:  Electrical Stimulation IFC, Gait Training, Manual Therapy, Moist Heat/ Ice, Neuromuscular Re-ed, Patient Education, Therapeutic Activities, and Therapeutic Exercise  Other Recommendations: sharon Diallo, PT

## 2023-10-18 ENCOUNTER — CLINICAL SUPPORT (OUTPATIENT)
Dept: REHABILITATION | Facility: HOSPITAL | Age: 73
End: 2023-10-18
Payer: MEDICARE

## 2023-10-18 DIAGNOSIS — Z96.651 STATUS POST TOTAL KNEE REPLACEMENT USING CEMENT, RIGHT: Primary | ICD-10-CM

## 2023-10-18 DIAGNOSIS — R26.89 IMPAIRED GAIT AND MOBILITY: ICD-10-CM

## 2023-10-18 DIAGNOSIS — R29.898 WEAKNESS OF BOTH LOWER EXTREMITIES: ICD-10-CM

## 2023-10-18 DIAGNOSIS — M25.661 IMPAIRED RANGE OF MOTION OF RIGHT KNEE: Primary | ICD-10-CM

## 2023-10-18 PROCEDURE — 97110 THERAPEUTIC EXERCISES: CPT | Mod: KX,PO | Performed by: PHYSICAL THERAPIST

## 2023-10-18 NOTE — PROGRESS NOTES
[facility]    Physical Therapy Treatment Note     Name: Dayton Faye  Clinic Number: [id number]    Therapy Diagnosis:   Encounter Diagnoses   Name Primary?    Impaired range of motion of right knee Yes    Weakness of both lower extremities     Impaired gait and mobility      Physician: Fili Scott MD    Visit Date: 10/18/2023    Physician: Fili Scott,*     Physician Orders: PT Eval and Treat   Post Surgical? Yes Eval and Treat Yes Type of Therapy Outpatient Therapy   Medical Diagnosis from Referral: Z96.651 (ICD-10-CM) - Status post total knee replacement using cement, right   Evaluation Date: 8/25/2023  Authorization Period Expiration: 8/20/2024, 12/31/2023  Plan of Care Expiration: 10/20/2023, 11/17/2023  Progress Note Due: 9/24/2023, 10/23/2023, 11/16/2023   Visit # / Visits authorized: 22/40 (16 for this referral)  FOTO: 2/ 3     Precautions: Standard, Diabetes, and HTN       Time In: 1400  Time Out: 1500  Total Billable Time: 55 minutes (1:1)    SUBJECTIVE     Pt reports: buttock pain is better, but back pain is present as to be expected. He feels more stability walking than before. Left LE continues limiting to his walk. His back pn is most limiting.    He was partially compliant with home exercise program.  Response to previous treatment: No adverse effects  Functional change: able to flex knee enough to put socks and shoes on     Pain: 3/10  Location: right below knee      OBJECTIVE     Objective Measures updated at progress report unless specified.     10/11/1023 and 10/16/2023:  Observation: Pt is ambulating well with Hurrycane and proper gt pattern. Walking without cane is largely limited by Left sided back, hip, and ankle/foot pain, weakness, and stiffness.     Posture: fair     Range of Motion:   Right -2 to 115 deg consistently    Lumbar spinal extension in standing limited to 10% but improving with repeated standing extension x 20. Pt reporting more back  "specific pain rather than back to upper buttock pain post testing. Pt instructed to cont standing lumbar extension at counter top hourly x 10 or 5-x/day.    Lower Extremity Strength  Right LE   Left LE     Knee extension: 4+/5 Knee extension: 4+/5   Knee flexion: 4/5 Knee flexion: 4/5   Hip flexion: 4/5 Hip flexion: 4/5   Hip extension:  4-/5  Hip extension: 4/5   Hip abduction: 3+/5 Hip abduction: 4-/5   Hip adduction: 4-/5 Hip adduction 4/5   Ankle dorsiflexion: 5/5 Ankle dorsiflexion: 4+/5   Ankle plantarflexion: 5/5 Ankle plantarflexion: 4-/5 (hx foot problem and calf atrophy noted      Function:  - Sit <--> Stand: modified (I) using UE for assistance and ease, but hands not needed. s/p right TKA 8/8/2023   - Bed Mobility: modified (I) and improved     Joint Mobility: WNL for s/p 8 weeks     Intake Outcome Measure for FOTO KNEE Survey     Therapist reviewed FOTO scores for Dayton Faye on 8/25/2023.   FOTO report - see Media section or FOTO account episode details.     Intake Score: 40  Goal: 63  10/2/2023: 54  10/18/2023: 56 (+16)        TREATMENT     De received the treatments listed below:      therapeutic exercises to develop strength and ROM for 60 minutes including:  Stat bike seat 14 10 mins  Calf stretch on wedge 2 x 1 mins  HSC 70# 3 x 10  Leg extension 30# 3 x 10  Leg extension single LEG 20# 2 x 10  Leg press DOUBLE LEG 80# 3 x 10  Leg press SINGLE LEG 40# 2 x 10  Hip matrix abd 40# 2x10  Standing at stairs knee extension stretch x 2 mins  Sit<>stand 2 x 10    SLS R LE 3x 30 sec On airex and bilateral  Tandem stance On airex 3 x 30" ea    Not performed:  SLR with QS 2 x 20  Heel slides 10 x 15"  Standing hip 2 x 10:  Abduction  Extension  March  Standing 2 x 20:  HSC  Mini squat 2 x 10    neuromuscular re-education activities to improve: Sense and Proprioception for 00 minutes. The following activities were included:  TKE with hip extended in standing w/ GTB 2 x 10    LONG SITTING QS   3 X 10, 5 S " "HOLDS-NP  LAQ 3 x 8 with ball for VMO and 3#-NP  Side lying clam 2 x 10 (Vcs and Tcs for glute recruitment and avoid spinal rotation)-NP    received the following manual therapy techniques: Soft tissue Mobilization were applied to the:  for 00 minutes, including:  Superior and inf patellar mobs grade III and tibial INTERNAL ROTATION gd II for knee ROM    Gait training in // bars x 00 mins.  Heel strike-foot flat/quad and glute recruitment, without AD and with gt belt    Therapeutic Activities x 00 mins to improve gt.  Standing heel raises on calf board L2 incline x 20  Step ups 6" x 20  Lateral step downs 4" step x15    PATIENT EDUCATION AND HOME EXERCISES     Home Exercises Provided and Patient Education Provided     Education provided:   - Educated pt on the importance of daily stretch to increase the benefit of therapy and positive results.      Written Home Exercises Provided: Patient instructed to cont prior HEP. Exercises were reviewed and Santino was able to demonstrate them prior to the end of the session.  Santino demonstrated good  understanding of the education provided. See EMR under Patient Instructions for exercises provided during therapy sessions    ASSESSMENT   Santino returned reporting R knee pain since back has been addressed. No more hip pain, but back pain is now apparent. Santino is -2 to 115 deg right knee AROM which is acceptable given his home program performance and activity level. His right LE strength is equal to or greater than Left LE with exception of a few hip motions. Pt is walking more stable in clinic without AD, and he will continue this at home. He is to cont with cane in community at this time. 2 goals met. FOTO functional score is improving, but goal not met yet. Pt may benefit from additional strengthening and gt training towards goal of removing AD completely. MD f/u early next mo. Will continue to progress per pt's tolerance. Update HEP next visit if needed.    Santino Is " progressing well towards his goals.   Pt prognosis is Good.     Pt will continue to benefit from skilled outpatient physical therapy to address the deficits listed in the problem list box on initial evaluation, provide pt/family education and to maximize pt's level of independence in the home and community environment.     Pt's spiritual, cultural and educational needs considered and pt agreeable to plan of care and goals.     Anticipated barriers to physical therapy: none    Goals:  Short Term Goals: 4 weeks   -right knee AROM 0-120 deg for improving function.-not met  -right hip and knee grossly 4-/5 for improving fx.-ongoing  -Pt (I) in bed mobility without difficulty.-MET  -Dec edema at knee by 1 cm to assist with strength and ROM.  -FOTO impairment score 50 for improving fx.-MET  -Pt will have normal gt.-ongoing  -SLR without knee lag in extension.-MET     Long Term Goals: 8 weeks   -LE strength as tested grossly 4/5 or greater.-ongoing  -FOTO impairment score 72 for improving fx. -ongoing  -Pt will be (I) in maintenance program.-ongoing     PLAN     Plan of care Certification: 10/20/2023 to 11/17/2023.     Outpatient Physical Therapy 1-2 times weekly for 4 weeks to include the following interventions: Aquatic Therapy, Electrical Stimulation IFC, Gait Training, Manual Therapy, Moist Heat/ Ice, Neuromuscular Re-ed, Patient Education, Self Care, Therapeutic Activities, Therapeutic Exercise, and Ultrasound.        Umu Diallo, PT

## 2023-10-20 ENCOUNTER — LAB VISIT (OUTPATIENT)
Dept: LAB | Facility: HOSPITAL | Age: 73
End: 2023-10-20
Attending: ORTHOPAEDIC SURGERY
Payer: MEDICARE

## 2023-10-20 DIAGNOSIS — Z96.651 STATUS POST TOTAL KNEE REPLACEMENT USING CEMENT, RIGHT: ICD-10-CM

## 2023-10-20 PROCEDURE — 36415 COLL VENOUS BLD VENIPUNCTURE: CPT | Mod: PO | Performed by: ORTHOPAEDIC SURGERY

## 2023-10-20 PROCEDURE — 80048 BASIC METABOLIC PNL TOTAL CA: CPT | Performed by: ORTHOPAEDIC SURGERY

## 2023-10-21 LAB
ANION GAP SERPL CALC-SCNC: 13 MMOL/L (ref 8–16)
BUN SERPL-MCNC: 21 MG/DL (ref 8–23)
CALCIUM SERPL-MCNC: 9.1 MG/DL (ref 8.7–10.5)
CHLORIDE SERPL-SCNC: 106 MMOL/L (ref 95–110)
CO2 SERPL-SCNC: 19 MMOL/L (ref 23–29)
CREAT SERPL-MCNC: 1.7 MG/DL (ref 0.5–1.4)
EST. GFR  (NO RACE VARIABLE): 42.3 ML/MIN/1.73 M^2
GLUCOSE SERPL-MCNC: 99 MG/DL (ref 70–110)
POTASSIUM SERPL-SCNC: 4.2 MMOL/L (ref 3.5–5.1)
SODIUM SERPL-SCNC: 138 MMOL/L (ref 136–145)

## 2023-10-31 DIAGNOSIS — Z96.651 STATUS POST TOTAL KNEE REPLACEMENT USING CEMENT, RIGHT: Primary | ICD-10-CM

## 2023-11-06 ENCOUNTER — CLINICAL SUPPORT (OUTPATIENT)
Dept: REHABILITATION | Facility: HOSPITAL | Age: 73
End: 2023-11-06
Payer: MEDICARE

## 2023-11-06 DIAGNOSIS — M25.661 IMPAIRED RANGE OF MOTION OF RIGHT KNEE: Primary | ICD-10-CM

## 2023-11-06 DIAGNOSIS — R26.89 IMPAIRED GAIT AND MOBILITY: ICD-10-CM

## 2023-11-06 DIAGNOSIS — R29.898 WEAKNESS OF BOTH LOWER EXTREMITIES: ICD-10-CM

## 2023-11-06 PROCEDURE — 97110 THERAPEUTIC EXERCISES: CPT | Mod: KX,PO,CQ

## 2023-11-06 NOTE — PROGRESS NOTES
[facility]    Physical Therapy Treatment Note     Name: Dayton Faye  Clinic Number: [id number]    Therapy Diagnosis:   Encounter Diagnoses   Name Primary?    Impaired range of motion of right knee Yes    Weakness of both lower extremities     Impaired gait and mobility      Physician: Fili Scott MD    Visit Date: 11/6/2023    Physician: Fili Scott,*     Physician Orders: PT Eval and Treat   Post Surgical? Yes Eval and Treat Yes Type of Therapy Outpatient Therapy   Medical Diagnosis from Referral: Z96.651 (ICD-10-CM) - Status post total knee replacement using cement, right   Evaluation Date: 8/25/2023  Authorization Period Expiration: 8/20/2024, 12/31/2023  Plan of Care Expiration: 10/20/2023, 11/17/2023  Progress Note Due: 9/24/2023, 10/23/2023, 11/16/2023   Visit # / Visits authorized: 23/40 (16 for this referral)  FOTO: 2/ 3       Precautions: Standard, Diabetes, and HTN       Time In: 1456  Time Out: 1555  Total Billable Time: 30 minutes    SUBJECTIVE     Pt reports: he has missed the last few weeks of PT due to lingering sinus infection. Patient states he is feeling weaker than usual due to this and not performing HEP. Patient states his knee is doing well but he continues to have pain off/on. He was partially compliant with home exercise program.  Response to previous treatment: No adverse effects  Functional change: able to flex knee enough to put socks and shoes on     Pain: 3/10  Location: right below knee      OBJECTIVE     Objective Measures updated at progress report unless specified.     TREATMENT     Santino received the treatments listed below:      therapeutic exercises to develop strength and ROM for 60 minutes including:  Stat bike seat 14 10 mins  Calf stretch on wedge 2 x 1 mins  HSC 70# 3 x 10  Leg extension 30# 3 x 10  Leg extension single LEG 10# 2 x 10  Leg press DOUBLE LEG 80# 3 x 10  Leg press SINGLE LEG 40# 2 x 10  Hip matrix abd 25#  "2x10  Standing at stairs knee extension stretch x 2 mins  Sit<>stand 2 x 10    SLS R LE 3x 30 sec On airex and bilateral  Tandem stance On airex 3 x 30" ea    Not performed:  SLR with QS 2 x 20  Heel slides 10 x 15"  Standing hip 2 x 10:  Abduction  Extension  March  Standing 2 x 20:  HSC  Mini squat 2 x 10    neuromuscular re-education activities to improve: Sense and Proprioception for 00 minutes. The following activities were included:  TKE with hip extended in standing w/ GTB 2 x 10    LONG SITTING QS   3 X 10, 5 S HOLDS-NP  LAQ 3 x 8 with ball for VMO and 3#-NP  Side lying clam 2 x 10 (Vcs and Tcs for glute recruitment and avoid spinal rotation)-NP    received the following manual therapy techniques: Soft tissue Mobilization were applied to the:  for 00 minutes, including:  Superior and inf patellar mobs grade III and tibial INTERNAL ROTATION gd II for knee ROM    Gait training in // bars x 00 mins.  Heel strike-foot flat/quad and glute recruitment, without AD and with gt belt    Therapeutic Activities x 00 mins to improve gt.  Standing heel raises on calf board L2 incline x 20  Step ups 6" x 20  Lateral step downs 4" step x15    PATIENT EDUCATION AND HOME EXERCISES     Home Exercises Provided and Patient Education Provided     Education provided:   - Educated pt on the importance of daily stretch to increase the benefit of therapy and positive results.      Written Home Exercises Provided: Patient instructed to cont prior HEP. Exercises were reviewed and De was able to demonstrate them prior to the end of the session.  De demonstrated good  understanding of the education provided. See EMR under Patient Instructions for exercises provided during therapy sessions    ASSESSMENT   De easily fatigued with strengthening exercises requiring modification of weight to maintain proper form. This is likely due to recent break from PT and non-compliance with HEP due to illness. Patient ambulating with SPC " without loss of balance.    Santino Is progressing well towards his goals.   Pt prognosis is Good.     Pt will continue to benefit from skilled outpatient physical therapy to address the deficits listed in the problem list box on initial evaluation, provide pt/family education and to maximize pt's level of independence in the home and community environment.     Pt's spiritual, cultural and educational needs considered and pt agreeable to plan of care and goals.     Anticipated barriers to physical therapy: none    Goals:  Short Term Goals: 4 weeks   -right knee AROM 0-120 deg for improving function.-not met  -right hip and knee grossly 4-/5 for improving fx.-ongoing  -Pt (I) in bed mobility without difficulty.-MET  -Dec edema at knee by 1 cm to assist with strength and ROM.  -FOTO impairment score 50 for improving fx.-MET  -Pt will have normal gt.-ongoing  -SLR without knee lag in extension.-MET     Long Term Goals: 8 weeks   -LE strength as tested grossly 4/5 or greater.-ongoing  -FOTO impairment score 72 for improving fx. -ongoing  -Pt will be (I) in maintenance program.-ongoing     PLAN     Plan of care Certification: 10/20/2023 to 11/17/2023.     Outpatient Physical Therapy 1-2 times weekly for 4 weeks to include the following interventions: Aquatic Therapy, Electrical Stimulation IFC, Gait Training, Manual Therapy, Moist Heat/ Ice, Neuromuscular Re-ed, Patient Education, Self Care, Therapeutic Activities, Therapeutic Exercise, and Ultrasound.        Rachel Oden, PTA

## 2023-11-08 ENCOUNTER — CLINICAL SUPPORT (OUTPATIENT)
Dept: REHABILITATION | Facility: HOSPITAL | Age: 73
End: 2023-11-08
Payer: MEDICARE

## 2023-11-08 ENCOUNTER — HOSPITAL ENCOUNTER (OUTPATIENT)
Dept: RADIOLOGY | Facility: HOSPITAL | Age: 73
Discharge: HOME | End: 2023-11-08
Attending: ORTHOPAEDIC SURGERY
Payer: MEDICARE

## 2023-11-08 ENCOUNTER — OFFICE VISIT (OUTPATIENT)
Dept: ORTHOPEDICS | Facility: CLINIC | Age: 73
End: 2023-11-08
Payer: MEDICARE

## 2023-11-08 VITALS — BODY MASS INDEX: 30.1 KG/M2 | WEIGHT: 215 LBS | HEIGHT: 71 IN | RESPIRATION RATE: 18 BRPM

## 2023-11-08 DIAGNOSIS — Z96.651 STATUS POST TOTAL KNEE REPLACEMENT USING CEMENT, RIGHT: ICD-10-CM

## 2023-11-08 DIAGNOSIS — R29.898 WEAKNESS OF BOTH LOWER EXTREMITIES: ICD-10-CM

## 2023-11-08 DIAGNOSIS — R26.89 IMPAIRED GAIT AND MOBILITY: ICD-10-CM

## 2023-11-08 DIAGNOSIS — Z96.651 STATUS POST TOTAL KNEE REPLACEMENT USING CEMENT, RIGHT: Primary | ICD-10-CM

## 2023-11-08 DIAGNOSIS — M25.661 IMPAIRED RANGE OF MOTION OF RIGHT KNEE: Primary | ICD-10-CM

## 2023-11-08 PROCEDURE — 99213 OFFICE O/P EST LOW 20 MIN: CPT | Mod: PBBFAC,PO | Performed by: ORTHOPAEDIC SURGERY

## 2023-11-08 PROCEDURE — 99999 PR PBB SHADOW E&M-EST. PATIENT-LVL III: CPT | Mod: PBBFAC,,, | Performed by: ORTHOPAEDIC SURGERY

## 2023-11-08 PROCEDURE — 99213 PR OFFICE/OUTPT VISIT, EST, LEVL III, 20-29 MIN: ICD-10-PCS | Mod: S$PBB,,, | Performed by: ORTHOPAEDIC SURGERY

## 2023-11-08 PROCEDURE — 99213 OFFICE O/P EST LOW 20 MIN: CPT | Mod: S$PBB,,, | Performed by: ORTHOPAEDIC SURGERY

## 2023-11-08 PROCEDURE — 97110 THERAPEUTIC EXERCISES: CPT | Mod: PO | Performed by: PHYSICAL THERAPIST

## 2023-11-08 PROCEDURE — 99999 PR PBB SHADOW E&M-EST. PATIENT-LVL III: ICD-10-PCS | Mod: PBBFAC,,, | Performed by: ORTHOPAEDIC SURGERY

## 2023-11-08 PROCEDURE — 73564 XR KNEE ORTHO BILAT WITH FLEXION: ICD-10-PCS | Mod: 26,50,, | Performed by: RADIOLOGY

## 2023-11-08 PROCEDURE — 73564 X-RAY EXAM KNEE 4 OR MORE: CPT | Mod: 26,50,, | Performed by: RADIOLOGY

## 2023-11-08 PROCEDURE — 73564 X-RAY EXAM KNEE 4 OR MORE: CPT | Mod: TC,50,PO

## 2023-11-08 NOTE — PROGRESS NOTES
Past Medical History:   Diagnosis Date    General anesthetics causing adverse effect in therapeutic use     takes longer to wake up after anesthesia    Hypertension     PONV (postoperative nausea and vomiting)     Pre-diabetes     Retinal detachment        Past Surgical History:   Procedure Laterality Date    BACK SURGERY      CATARACT EXTRACTION Bilateral     EYE SURGERY      HIP SURGERY Right     JOINT REPLACEMENT  2016    Right hip joint replaced    KNEE SURGERY Left     RETINAL DETACHMENT SURGERY Left     ROBOTIC ARTHROPLASTY, KNEE Right 8/8/2023    Procedure: ROBOTIC ARTHROPLASTY, KNEE, TOTAL;  Surgeon: Fili Scott MD;  Location: [facility];  Service: Orthopedics;  Laterality: Right;    SHOULDER SURGERY Right     SHOULDER SURGERY Left     SPINE SURGERY      lumbar fusion       Current Outpatient Medications   Medication Sig    atorvastatin (LIPITOR) 20 MG tablet Take 1 tablet (20 mg total) by mouth once daily.    CLARITIN-D 24 HOUR  mg per 24 hr tablet Take 1 tablet by mouth.    coenzyme Q10 100 mg capsule Take 200 mg by mouth.    furosemide (LASIX) 40 MG tablet Take 1 tablet (40 mg total) by mouth daily as needed (swelling).    ginkgo biloba 120 mg Tab Take by mouth once daily.    ibuprofen (ADVIL,MOTRIN) 600 MG tablet Take 1 tablet (600 mg total) by mouth every 6 (six) hours as needed for Pain.    krill-om-3-dha-epa-phospho-ast [id number] mg Cap Take by mouth once daily.    losartan (COZAAR) 100 MG tablet Take 1 tablet (100 mg total) by mouth once daily.    mv-min-folic acid-lutein 500-250 mcg Chew Take by mouth.    pantoprazole (PROTONIX) 40 MG tablet Take 1 tablet (40 mg total) by mouth once daily.    potassium chloride SA (K-DUR,KLOR-CON M) 10 MEQ tablet Take 10 mEq by mouth. PRN WITH LASIX    prednisoLONE acetate (PRED MILD) 0.12 % ophthalmic suspension Place 1 drop into the left eye every morning.    tiZANidine (ZANAFLEX) 4 MG tablet TAKE 1 TABLET(4 MG) BY MOUTH EVERY 8 HOURS    vit  "C/E/Zn/coppr/lutein/zeaxan (PRESERVISION AREDS-2 ORAL) Take by mouth 2 (two) times a day.    acetaminophen (TYLENOL) 500 MG tablet Take 2 tablets (1,000 mg total) by mouth every 8 (eight) hours as needed for Pain. (Patient not taking: Reported on 2023)    aspirin (ECOTRIN) 81 MG EC tablet Take 1 tablet (81 mg total) by mouth 2 (two) times a day.    oxyCODONE-acetaminophen (PERCOCET) 5-325 mg per tablet Take 1 tablet by mouth every 6 (six) hours as needed for Pain. (Patient not taking: Reported on 2023)     No current facility-administered medications for this visit.     Facility-Administered Medications Ordered in Other Visits   Medication    electrolyte-S (ISOLYTE)    electrolyte-S (ISOLYTE)    fentaNYL 50 mcg/mL injection 25 mcg    fentaNYL 50 mcg/mL injection  mcg    LIDOcaine (PF) 10 mg/ml (1%) injection 10 mg    midazolam (VERSED) 1 mg/mL injection 2 mg    ondansetron injection 4 mg    oxyCODONE immediate release tablet 5 mg       Review of patient's allergies indicates:   Allergen Reactions    Adhesive Rash     Patients states "surgical tape."    Latex, natural rubber Rash       Family History   Problem Relation Age of Onset    Macular degeneration Father     Heart disease Father     Arthritis Father          at age 92    Hypertension Father     Vision loss Father     No Known Problems Sister     No Known Problems Brother     Cancer Paternal Grandmother         Lung    Cancer Paternal Grandfather         Prostate    Glaucoma Neg Hx        Social History     Socioeconomic History    Marital status:    Occupational History    Occupation: School psychologist     Comment: retired   Tobacco Use    Smoking status: Former     Current packs/day: 0.00     Average packs/day: 0.5 packs/day for 5.0 years (2.5 ttl pk-yrs)     Types: Cigarettes, Pipe     Start date: 1986     Quit date: 1991     Years since quittin.8    Smokeless tobacco: Never   Substance and Sexual Activity    " Alcohol use: Yes     Comment: Very occasionally    Drug use: Never    Sexual activity: Yes     Partners: Female     Birth control/protection: Post-menopausal   Social History Narrative    Lives with female partner.  Moved here from [place]. Two cats. Enjoys ancestry, reading.       Chief Complaint:   Chief Complaint   Patient presents with    Follow-up     S/p right knee TKA, dos 8/8/23       Date of surgery:8/8/23    History of present illness:  72-year-old male underwent right robotic assisted total knee arthroplasty.  He is doing very well.  Walking without an assistive device.  Just has a little swelling.  Pain is minimal as a 1/10.      Review of Systems:    Musculoskeletal:  See HPI        Physical Examination:    Vital Signs:    Vitals:    11/08/23 1320   Resp: 18       Body mass index is 29.99 kg/m².    This a well-developed, well nourished patient in no acute distress.  They are alert and oriented and cooperative to examination.  Pt. walks without an antalgic gait.      Examination right knee shows well-healed surgical incision .  No erythema drainage. No calf pain.  Range of motion about 0-120 degrees.  Knee is stable to varus and valgus stress.  Negative instability with drawer exam.    X-rays:  Four views of both knees are ordered and reviewed which show well-aligned bilateral total knee arthroplasty components without complication     Assessment::  Status post right robotic assisted total knee arthroplasty using cement    Plan:   Continue with the outpatient physical therapy.  I will see him in 3 months.  No x-rays needed at that time.    This note was created using [employer] voice recognition software that occasionally misinterpreted phrases or words.

## 2023-11-08 NOTE — PROGRESS NOTES
[facility]    Reassessment: Physical Therapy Treatment Note     Name: Dayton Faye  Clinic Number: [id number]    Therapy Diagnosis:   Encounter Diagnoses   Name Primary?    Impaired range of motion of right knee Yes    Weakness of both lower extremities     Impaired gait and mobility      Physician: Fili Scott MD    Visit Date: 11/8/2023    Physician: Fili Scott,*     Physician Orders: PT Eval and Treat   Post Surgical? Yes Eval and Treat Yes Type of Therapy Outpatient Therapy   Medical Diagnosis from Referral: Z96.651 (ICD-10-CM) - Status post total knee replacement using cement, right   Evaluation Date: 8/25/2023  Authorization Period Expiration: 8/20/2024, 12/31/2023  Plan of Care Expiration: 10/20/2023, 11/17/2023  Progress Note Due: 9/24/2023, 10/23/2023, 11/16/2023   Visit # / Visits authorized: 24/40 (17 for this referral)  FOTO: 3/ 3       Precautions: Standard, Diabetes, and HTN       Time In: 1500  Time Out: 1600  Total Billable Time: 55 minutes    SUBJECTIVE     Pt reports: he has missed the last few weeks of PT due to lingering sinus infection. Patient states he is feeling weaker than usual due to this and not performing HEP. Patient states his knee is doing well but he continues to have pain off/on. He is weaker right LE than Left. He has back pain today limiting him. He was partially compliant with home exercise program.  Response to previous treatment: No adverse effects  Functional change: able to flex knee enough to put socks and shoes on     Pain: 0/10  Location: right below knee      OBJECTIVE     Update:   Objective Measures updated at progress report unless specified.      11/8/2023:  Observation: Pt is ambulating well with Hurrycane and proper gt pattern. Walking without cane is largely limited by Left sided back, hip, and ankle/foot pain, weakness, and stiffness.     Posture: fair     Range of Motion:   Right -2 to 115 deg consistently     Lumbar  "spinal extension in standing limited to 10% but improving with repeated standing extension x 20. Pt reporting more back specific pain rather than back to upper buttock pain post testing. Pt instructed to cont standing lumbar extension at counter top hourly x 10 or 5-x/day.      Lower Extremity Strength  Right LE   Left LE     Knee extension: 4+/5 Knee extension: 4+/5   Knee flexion: 4/5 Knee flexion: 4/5   Hip flexion: 4/5 Hip flexion: 4/5   Hip extension:  4-/5  (3/5 today) Hip extension: 4/5   Hip abduction: 3+/5 Hip abduction: 4-/5   Hip adduction: 4-/5 Hip adduction 4/5   Ankle dorsiflexion: 5/5 Ankle dorsiflexion: 4+/5   Ankle plantarflexion: 5/5 Ankle plantarflexion: 4-/5 (hx foot problem and calf atrophy noted      Function:  - Sit <--> Stand: (I) without HHA   - Bed Mobility: modified (I) and improved     Joint Mobility: WNL      Intake Outcome Measure for FOTO KNEE Survey     Therapist reviewed FOTO scores for Dayton Faye on 8/25/2023.   FOTO report - see Media section or FOTO account episode details.     Intake Score: 40  Goal: 63  10/2/2023: 54  10/18/2023: 56  11/8/2023: 70 (+30)         TREATMENT     De received the treatments listed below:      therapeutic exercises to develop strength and ROM for 60 minutes including:  Stat bike seat 14 10 mins  Calf stretch on wedge 2 x 1 mins  HSC 70# 3 x 10  Leg extension 30# 3 x 10  Leg extension single LEG 10# 2 x 10  Leg press DOUBLE LEG 80# 3 x 10  Leg press SINGLE LEG 40# 2 x 10  Hip matrix abd 25# 2x10  Standing at stairs knee extension stretch x 2 mins  Reassessment.    Not performed:  SLS R LE 3x 30 sec On airex and bilateral  Tandem stance On airex 3 x 30" ea  SLR with QS 2 x 20  Heel slides 10 x 15"  Standing hip 2 x 10:  Abduction  Extension  March  Standing 2 x 20:  HSC  Mini squat 2 x 10    neuromuscular re-education activities to improve: Sense and Proprioception for 00 minutes. The following activities were included:  TKE with hip extended in " "standing w/ GTB 2 x 10    LONG SITTING QS   3 X 10, 5 S HOLDS-NP  LAQ 3 x 8 with ball for VMO and 3#-NP  Side lying clam 2 x 10 (Vcs and Tcs for glute recruitment and avoid spinal rotation)-NP    received the following manual therapy techniques: Soft tissue Mobilization were applied to the:  for 00 minutes, including:  Superior and inf patellar mobs grade III and tibial INTERNAL ROTATION gd II for knee ROM    Gait training in // bars x 00 mins.  Heel strike-foot flat/quad and glute recruitment, without AD and with gt belt    Therapeutic Activities x 00 mins to improve gt.  Standing heel raises on calf board L2 incline x 20  Step ups 6" x 20  Lateral step downs 4" step x15    PATIENT EDUCATION AND HOME EXERCISES     Home Exercises Provided and Patient Education Provided     Education provided:   - Educated pt on the importance of daily stretch to increase the benefit of therapy and positive results.      Written Home Exercises Provided: Patient instructed to cont prior HEP. Exercises were reviewed and Santino was able to demonstrate them prior to the end of the session.  Santino demonstrated good  understanding of the education provided. See EMR under Patient Instructions for exercises provided during therapy sessions    ASSESSMENT   Santino easily fatigued with strengthening exercises. He had a set back d/t recent illness. He was not progressing significantly before the setback. Home program performance has been inconsistent along the way. We discussed continuing POC and then d/c next week to HEP. Patient ambulating with SPC without loss of balance. Bilateral strength deficits are noted with slow progress on right LE.    Santino Is progressing well towards his goals.   Pt prognosis is Good.     Pt will continue to benefit from skilled outpatient physical therapy to address the deficits listed in the problem list box on initial evaluation, provide pt/family education and to maximize pt's level of independence in the home " and community environment.     Pt's spiritual, cultural and educational needs considered and pt agreeable to plan of care and goals.     Anticipated barriers to physical therapy: none    Goals:  Short Term Goals: 4 weeks   -right knee AROM 0-120 deg for improving function.-not met  -right hip and knee grossly 4-/5 for improving fx.-ongoing  -Pt (I) in bed mobility without difficulty.-MET  -Dec edema at knee by 1 cm to assist with strength and ROM.  -FOTO impairment score 50 for improving fx.-MET  -Pt will have normal gt.-ongoing  -SLR without knee lag in extension.-MET     Long Term Goals: 8 weeks   -LE strength as tested grossly 4/5 or greater.-ongoing  -FOTO impairment score 72 for improving fx. -ongoing  -Pt will be (I) in maintenance program.-ongoing     PLAN     Plan of care Certification: 10/20/2023 to 11/17/2023.     Outpatient Physical Therapy 1-2 times weekly for 4 weeks to include the following interventions: Aquatic Therapy, Electrical Stimulation IFC, Gait Training, Manual Therapy, Moist Heat/ Ice, Neuromuscular Re-ed, Patient Education, Self Care, Therapeutic Activities, Therapeutic Exercise, and Ultrasound.        Umu Diallo, PT

## 2023-11-13 ENCOUNTER — CLINICAL SUPPORT (OUTPATIENT)
Dept: REHABILITATION | Facility: HOSPITAL | Age: 73
End: 2023-11-13
Payer: MEDICARE

## 2023-11-13 DIAGNOSIS — M25.661 IMPAIRED RANGE OF MOTION OF RIGHT KNEE: Primary | ICD-10-CM

## 2023-11-13 DIAGNOSIS — R26.89 IMPAIRED GAIT AND MOBILITY: ICD-10-CM

## 2023-11-13 DIAGNOSIS — R29.898 WEAKNESS OF BOTH LOWER EXTREMITIES: ICD-10-CM

## 2023-11-13 PROCEDURE — 97110 THERAPEUTIC EXERCISES: CPT | Mod: PO | Performed by: PHYSICAL THERAPIST

## 2023-11-13 NOTE — PROGRESS NOTES
[facility]     Physical Therapy Treatment Note     Name: Dayton Faye  Clinic Number: [id number]    Therapy Diagnosis:   Encounter Diagnoses   Name Primary?    Impaired range of motion of right knee Yes    Weakness of both lower extremities     Impaired gait and mobility      Physician: Fili Scott MD    Visit Date: 11/13/2023    Physician: Fili Scott,*     Physician Orders: PT Eval and Treat   Post Surgical? Yes Eval and Treat Yes Type of Therapy Outpatient Therapy   Medical Diagnosis from Referral: Z96.651 (ICD-10-CM) - Status post total knee replacement using cement, right   Evaluation Date: 8/25/2023  Authorization Period Expiration: 8/20/2024, 12/31/2023  Plan of Care Expiration: 10/20/2023, 11/17/2023  Progress Note Due: 9/24/2023, 10/23/2023, 11/16/2023   Visit # / Visits authorized: 25/40 (17 for this referral)  FOTO: 3/ 3       Precautions: Standard, Diabetes, and HTN       Time In: 1500  Time Out: 1600  Total Billable Time: 30 minutes    SUBJECTIVE     Pt reports: his back and opposite leg remain limiting. He was partially compliant with home exercise program.  Response to previous treatment: No adverse effects  Functional change: able to flex knee enough to put socks and shoes on     Pain: 0/10  Location: right below knee      OBJECTIVE     Update:   Objective Measures updated at progress report unless specified.      11/8/2023:  Observation: Pt is ambulating well with Hurrycane and proper gt pattern. Walking without cane is largely limited by Left sided back, hip, and ankle/foot pain, weakness, and stiffness.     Posture: fair     Range of Motion:   Right -2 to 115 deg consistently     Lumbar spinal extension in standing limited to 10% but improving with repeated standing extension x 20. Pt reporting more back specific pain rather than back to upper buttock pain post testing. Pt instructed to cont standing lumbar extension at counter top hourly x 10 or 5-x/day.  "     Lower Extremity Strength  Right LE   Left LE     Knee extension: 4+/5 Knee extension: 4+/5   Knee flexion: 4/5 Knee flexion: 4/5   Hip flexion: 4/5 Hip flexion: 4/5   Hip extension:  4-/5  (3/5 today) Hip extension: 4/5   Hip abduction: 3+/5 Hip abduction: 4-/5   Hip adduction: 4-/5 Hip adduction 4/5   Ankle dorsiflexion: 5/5 Ankle dorsiflexion: 4+/5   Ankle plantarflexion: 5/5 Ankle plantarflexion: 4-/5 (hx foot problem and calf atrophy noted      Function:  - Sit <--> Stand: (I) without HHA   - Bed Mobility: modified (I) and improved     Joint Mobility: WNL      Intake Outcome Measure for FOTO KNEE Survey     Therapist reviewed FOTO scores for Dayton Faye on 8/25/2023.   FOTO report - see Media section or FOTO account episode details.     Intake Score: 40  Goal: 63  10/2/2023: 54  10/18/2023: 56  11/8/2023: 70 (+30)         TREATMENT     De received the treatments listed below:      therapeutic exercises to develop strength and ROM for 60 minutes including:  Stat bike seat 14 10 mins  Calf stretch on wedge 2 x 1 mins  HSC 70# 3 x 10  Leg extension 30# 3 x 10  Leg extension single LEG 10# 2 x 10  Leg press DOUBLE LEG 80# 3 x 10  Leg press SINGLE LEG 40# 2 x 10  Hip matrix abd 25# 2x10  Standing at stairs knee extension stretch x 2 mins    Not performed:  SLS R LE 3x 30 sec On airex and bilateral  Tandem stance On airex 3 x 30" ea  SLR with QS 2 x 20  Heel slides 10 x 15"  Standing hip 2 x 10:  Abduction  Extension  March  Standing 2 x 20:  HSC  Mini squat 2 x 10    neuromuscular re-education activities to improve: Sense and Proprioception for 00 minutes. The following activities were included:  TKE with hip extended in standing w/ GTB 2 x 10    LONG SITTING QS   3 X 10, 5 S HOLDS-NP  LAQ 3 x 8 with ball for VMO and 3#-NP  Side lying clam 2 x 10 (Vcs and Tcs for glute recruitment and avoid spinal rotation)-NP    received the following manual therapy techniques: Soft tissue Mobilization were applied to the:  " "for 00 minutes, including:  Superior and inf patellar mobs grade III and tibial INTERNAL ROTATION gd II for knee ROM    Gait training in // bars x 00 mins.  Heel strike-foot flat/quad and glute recruitment, without AD and with gt belt    Therapeutic Activities x 00 mins to improve gt.  Standing heel raises on calf board L2 incline x 20  Step ups 6" x 20  Lateral step downs 4" step x15    PATIENT EDUCATION AND HOME EXERCISES     Home Exercises Provided and Patient Education Provided     Education provided:   - Educated pt on the importance of daily stretch to increase the benefit of therapy and positive results.    - Importance of HEP and repeated back extension when flexed for period of time.    Written Home Exercises Provided: Patient instructed to cont prior HEP. Exercises were reviewed and Santino was able to demonstrate them prior to the end of the session.  Santino demonstrated good  understanding of the education provided. See EMR under Patient Instructions for exercises provided during therapy sessions    ASSESSMENT   Santino easily fatigued with strengthening exercises. He is able to correct gt to about 80-90% but quickly reverts to teetering. He reports this d/t back and Left leg. Plan to d/c next visit as this has not changed and pt shows no LOB.    Santino Is progressing well towards his goals.   Pt prognosis is Good.     Pt will continue to benefit from skilled outpatient physical therapy to address the deficits listed in the problem list box on initial evaluation, provide pt/family education and to maximize pt's level of independence in the home and community environment.     Pt's spiritual, cultural and educational needs considered and pt agreeable to plan of care and goals.     Anticipated barriers to physical therapy: none    Goals:  Short Term Goals: 4 weeks   -right knee AROM 0-120 deg for improving function.-not met  -right hip and knee grossly 4-/5 for improving fx.-ongoing  -Pt (I) in bed mobility " without difficulty.-MET  -Dec edema at knee by 1 cm to assist with strength and ROM.  -FOTO impairment score 50 for improving fx.-MET  -Pt will have normal gt.-ongoing  -SLR without knee lag in extension.-MET     Long Term Goals: 8 weeks   -LE strength as tested grossly 4/5 or greater.-ongoing  -FOTO impairment score 72 for improving fx. -ongoing  -Pt will be (I) in maintenance program.-ongoing     PLAN     Plan of care Certification: 10/20/2023 to 11/17/2023.     Outpatient Physical Therapy 1-2 times weekly for 4 weeks to include the following interventions: Aquatic Therapy, Electrical Stimulation IFC, Gait Training, Manual Therapy, Moist Heat/ Ice, Neuromuscular Re-ed, Patient Education, Self Care, Therapeutic Activities, Therapeutic Exercise, and Ultrasound.        Umu Diallo, PT

## 2023-11-17 ENCOUNTER — CLINICAL SUPPORT (OUTPATIENT)
Dept: REHABILITATION | Facility: HOSPITAL | Age: 73
End: 2023-11-17
Payer: MEDICARE

## 2023-11-17 DIAGNOSIS — M25.661 IMPAIRED RANGE OF MOTION OF RIGHT KNEE: Primary | ICD-10-CM

## 2023-11-17 DIAGNOSIS — R29.898 WEAKNESS OF BOTH LOWER EXTREMITIES: ICD-10-CM

## 2023-11-17 DIAGNOSIS — R26.89 IMPAIRED GAIT AND MOBILITY: ICD-10-CM

## 2023-11-17 NOTE — PLAN OF CARE
[facility]  PT Discharge Note     Name: Dayton Faye  [facility] Number: [id number]     Therapy Diagnosis:        Encounter Diagnoses   Name Primary?    Impaired range of motion of right knee Yes    Weakness of both lower extremities      Impaired gait and mobility        Physician: Issa Merida DPM     Physician Orders: PT Eval and Treat   Post Surgical? Yes Eval and Treat Yes Type of Therapy Outpatient Therapy   Medical Diagnosis from Referral: Z96.651 (ICD-10-CM) - Status post total knee replacement using cement, right   Evaluation Date: 8/25/2023     Date of Last visit: 11/17/2023  Total Visits Received: 18 for this referral     NS: 0  C/C: 7 d/t illness     ASSESSMENT       Santino has reached a plateau in his rehab. He can be (I) in home program. He is encouraged to cont HEP. Pt was given a machine HEP to carry into the gym. We discussed that when he is ready to comply to doing home regimen, he may see back and spine and get a referral to HB program for his back pain. Stressed the importance to perform HEP bilateral d/t chronic pain and weakness of Left LE.      Discharge reason: Patient has reached the maximum rehab potential for the present time.     Discharge FOTO Score: 74 (+34)     Goals: see below     PLAN   This patient is discharged from Physical Therapy.        Umu Diallo, PT    [facility]     Physical Therapy Treatment Note      Name: Dayton Faye  [facility] Number: [id number]     Therapy Diagnosis:   No diagnosis found.     Physician: Fili Scott MD     Visit Date: 11/17/2023     Physician: Fili Scott,*     Physician Orders: PT Eval and Treat   Post Surgical? Yes Eval and Treat Yes Type of Therapy Outpatient Therapy   Medical Diagnosis from Referral: Z96.651 (ICD-10-CM) - Status post total knee replacement using cement, right   Evaluation Date: 8/25/2023  Authorization Period Expiration: 8/20/2024, 12/31/2023  Plan of Care Expiration:  10/20/2023, 11/17/2023  Progress Note Due: 9/24/2023, 10/23/2023, 11/16/2023   Visit # / Visits authorized: 26/40 (18 for this referral)  FOTO: 5/ 3       Precautions: Standard, Diabetes, and HTN       Time In: 1000  Time Out: 1100  Total Billable Time: 30 minutes (1:1)     SUBJECTIVE      Pt reports: his back and opposite leg remain limiting. His right knee feels weak compared to left. But he is spotty on home program. He was partially compliant with home exercise program.  Response to previous treatment: No adverse effects  Functional change: able to flex knee enough to put socks and shoes on      Pain: 0/10  Location: right below knee       OBJECTIVE      Update:   Objective Measures updated at progress report unless specified.      11/17/2023:  Observation: Pt is ambulating well with Hurrycane and proper gt pattern. Walking without cane is largely limited by Left sided back, hip, and ankle/foot pain, weakness, and stiffness.     Posture: fair     Range of Motion:   Right -2 to 115 deg consistently     Lumbar spinal extension in standing limited to 10% but improving with repeated standing extension x 20. Pt reporting more back specific pain rather than back to upper buttock pain post testing. Pt instructed to cont standing lumbar extension at counter top hourly x 10 or 5-x/day.      Lower Extremity Strength  Right LE   Left LE     Knee extension: 4+/5 Knee extension: 4+/5   Knee flexion: 4/5 Knee flexion: 4/5   Hip flexion: 4/5 Hip flexion: 4/5   Hip extension:  4-/5  (3/5 today) Hip extension: 4/5   Hip abduction: 3+/5 Hip abduction: 4-/5   Hip adduction: 4-/5 Hip adduction 4/5   Ankle dorsiflexion: 5/5 Ankle dorsiflexion: 4+/5   Ankle plantarflexion: 5/5 Ankle plantarflexion: 4-/5 (hx foot problem and calf atrophy noted      Function:  - Sit <--> Stand: (I) without HHA   - Bed Mobility: modified (I) and improved     Joint Mobility: WNL      Intake Outcome Measure for FOTO KNEE Survey     Therapist reviewed FOTO  "scores for Dayton Faye on 8/25/2023.   FOTO report - see Media section or FOTO account episode details.     Intake Score: 40  Goal: 63  10/2/2023: 54  10/18/2023: 56  11/8/2023: 70 (+30)  11/17/2023: 74 (+34)         TREATMENT      Santino received the treatments listed below:       therapeutic exercises to develop strength and ROM for 30 minutes including:  Stat bike seat 14 10 mins  Calf stretch on wedge 2 x 1 mins  HSC 70# 3 x 10  Leg extension 30# 3 x 10  Leg extension single LEG 10# 2 x 10  Leg press DOUBLE LEG 80# 3 x 10  Leg press SINGLE LEG 40# 2 x 10  Hip matrix abd 25# 2x10  Standing at stairs knee extension stretch x 2 mins     Therapeutic Activities x 20 mins to improve gt.  Standing heel raises on calf board L2 incline x 20  Step ups 6" x 20  Lateral step downs 4" step x15     PATIENT EDUCATION AND HOME EXERCISES      Home Exercises Provided and Patient Education Provided      Education provided:   - Educated pt on the importance of daily stretch to increase the benefit of therapy and positive results.    - Importance of HEP and repeated back extension when flexed for period of time.     Written Home Exercises Provided: yes. Exercises were reviewed and Santino was able to demonstrate them prior to the end of the session.  Santino demonstrated good  understanding of the education provided. See EMR under Patient Instructions for exercises provided during therapy sessions     ASSESSMENT   Santino has reached a plateau in his rehab. He can be (I) in home program. He is encouraged to cont HEP. Pt was given a machine HEP to carry into the gym. We discussed that when he is ready to comply to doing home regimen, he may see back and spine and get a referral to HB program for his back pain. Stressed the importance to perform HEP bilateral d/t chronic pain and weakness of Left LE.  4/10 goals met.     Goals:  Short Term Goals: 4 weeks   -right knee AROM 0-120 deg for improving function.-not met  -right hip and knee " grossly 4-/5 for improving fx.-not met  -Pt (I) in bed mobility without difficulty.-MET  -Dec edema at knee by 1 cm to assist with strength and ROM.-not met   -FOTO impairment score 50 for improving fx.-MET  -Pt will have normal gt.-not met  -SLR without knee lag in extension.-MET     Long Term Goals: 8 weeks   -LE strength as tested grossly 4/5 or greater.-not met  -FOTO impairment score 72 for improving fx. -MET  -Pt will be (I) in maintenance program.-partially met     PLAN      Discharge to [facility].      Umu Diallo, PT

## 2023-11-17 NOTE — PROGRESS NOTES
[facility]  PT Discharge Note    Name: Dayton Faye  [facility] Number: [id number]    Therapy Diagnosis:   Encounter Diagnoses   Name Primary?    Impaired range of motion of right knee Yes    Weakness of both lower extremities     Impaired gait and mobility      Physician: Issa Merida DPM    Physician Orders: PT Eval and Treat   Post Surgical? Yes Eval and Treat Yes Type of Therapy Outpatient Therapy   Medical Diagnosis from Referral: Z96.651 (ICD-10-CM) - Status post total knee replacement using cement, right   Evaluation Date: 8/25/2023    Date of Last visit: 11/17/2023  Total Visits Received: 18 for this referral    NS: 0  C/C: 7 d/t illness    ASSESSMENT      Santino has reached a plateau in his rehab. He can be (I) in home program. He is encouraged to cont HEP. Pt was given a machine HEP to carry into the gym. We discussed that when he is ready to comply to doing home regimen, he may see back and spine and get a referral to HB program for his back pain. Stressed the importance to perform HEP bilateral d/t chronic pain and weakness of Left LE.     Discharge reason: Patient has reached the maximum rehab potential for the present time.    Discharge FOTO Score: 74 (+34)    Goals: see below    PLAN   This patient is discharged from Physical Therapy.      Umu Diallo, PT    [facility]     Physical Therapy Treatment Note     Name: Dayton Faye  [facility] Number: [id number]    Therapy Diagnosis:   No diagnosis found.    Physician: Fili Scott MD    Visit Date: 11/17/2023    Physician: Fili Scott,*     Physician Orders: PT Eval and Treat   Post Surgical? Yes Eval and Treat Yes Type of Therapy Outpatient Therapy   Medical Diagnosis from Referral: Z96.651 (ICD-10-CM) - Status post total knee replacement using cement, right   Evaluation Date: 8/25/2023  Authorization Period Expiration: 8/20/2024, 12/31/2023  Plan of Care Expiration: 10/20/2023,  11/17/2023  Progress Note Due: 9/24/2023, 10/23/2023, 11/16/2023   Visit # / Visits authorized: 26/40 (18 for this referral)  FOTO: 5/ 3       Precautions: Standard, Diabetes, and HTN       Time In: 1000  Time Out: 1100  Total Billable Time: 30 minutes (1:1)    SUBJECTIVE     Pt reports: his back and opposite leg remain limiting. His right knee feels weak compared to left. But he is spotty on home program. He was partially compliant with home exercise program.  Response to previous treatment: No adverse effects  Functional change: able to flex knee enough to put socks and shoes on     Pain: 0/10  Location: right below knee      OBJECTIVE     Update:   Objective Measures updated at progress report unless specified.      11/17/2023:  Observation: Pt is ambulating well with Hurrycane and proper gt pattern. Walking without cane is largely limited by Left sided back, hip, and ankle/foot pain, weakness, and stiffness.     Posture: fair     Range of Motion:   Right -2 to 115 deg consistently     Lumbar spinal extension in standing limited to 10% but improving with repeated standing extension x 20. Pt reporting more back specific pain rather than back to upper buttock pain post testing. Pt instructed to cont standing lumbar extension at counter top hourly x 10 or 5-x/day.      Lower Extremity Strength  Right LE   Left LE     Knee extension: 4+/5 Knee extension: 4+/5   Knee flexion: 4/5 Knee flexion: 4/5   Hip flexion: 4/5 Hip flexion: 4/5   Hip extension:  4-/5  (3/5 today) Hip extension: 4/5   Hip abduction: 3+/5 Hip abduction: 4-/5   Hip adduction: 4-/5 Hip adduction 4/5   Ankle dorsiflexion: 5/5 Ankle dorsiflexion: 4+/5   Ankle plantarflexion: 5/5 Ankle plantarflexion: 4-/5 (hx foot problem and calf atrophy noted      Function:  - Sit <--> Stand: (I) without HHA   - Bed Mobility: modified (I) and improved     Joint Mobility: WNL      Intake Outcome Measure for FOTO KNEE Survey     Therapist reviewed FOTO scores for Dayton  "Ball on 8/25/2023.   FOTO report - see Media section or FOTO account episode details.     Intake Score: 40  Goal: 63  10/2/2023: 54  10/18/2023: 56  11/8/2023: 70 (+30)  11/17/2023: 74 (+34)         TREATMENT     Santino received the treatments listed below:      therapeutic exercises to develop strength and ROM for 30 minutes including:  Stat bike seat 14 10 mins  Calf stretch on wedge 2 x 1 mins  HSC 70# 3 x 10  Leg extension 30# 3 x 10  Leg extension single LEG 10# 2 x 10  Leg press DOUBLE LEG 80# 3 x 10  Leg press SINGLE LEG 40# 2 x 10  Hip matrix abd 25# 2x10  Standing at stairs knee extension stretch x 2 mins    Therapeutic Activities x 20 mins to improve gt.  Standing heel raises on calf board L2 incline x 20  Step ups 6" x 20  Lateral step downs 4" step x15    PATIENT EDUCATION AND HOME EXERCISES     Home Exercises Provided and Patient Education Provided     Education provided:   - Educated pt on the importance of daily stretch to increase the benefit of therapy and positive results.    - Importance of HEP and repeated back extension when flexed for period of time.    Written Home Exercises Provided: yes. Exercises were reviewed and Santino was able to demonstrate them prior to the end of the session.  Santino demonstrated good  understanding of the education provided. See EMR under Patient Instructions for exercises provided during therapy sessions    ASSESSMENT   Santino has reached a plateau in his rehab. He can be (I) in home program. He is encouraged to cont HEP. Pt was given a machine HEP to carry into the gym. We discussed that when he is ready to comply to doing home regimen, he may see back and spine and get a referral to HB program for his back pain. Stressed the importance to perform HEP bilateral d/t chronic pain and weakness of Left LE.  4/10 goals met.    Goals:  Short Term Goals: 4 weeks   -right knee AROM 0-120 deg for improving function.-not met  -right hip and knee grossly 4-/5 for improving " fx.-not met  -Pt (I) in bed mobility without difficulty.-MET  -Dec edema at knee by 1 cm to assist with strength and ROM.-not met   -FOTO impairment score 50 for improving fx.-MET  -Pt will have normal gt.-not met  -SLR without knee lag in extension.-MET     Long Term Goals: 8 weeks   -LE strength as tested grossly 4/5 or greater.-not met  -FOTO impairment score 72 for improving fx. -MET  -Pt will be (I) in maintenance program.-partially met     PLAN     Discharge to [facility].     Umu Diallo, PT

## 2023-11-25 DIAGNOSIS — M62.838 MUSCLE SPASM: ICD-10-CM

## 2023-11-25 NOTE — TELEPHONE ENCOUNTER
Care Due:                  Date            Visit Type   Department     Provider  --------------------------------------------------------------------------------                                             [facility] FAMILY  Last Visit: 07-      PRE-OP       MEDICINE       Thierno Watson                              EP -                              PRIMARY      [facility]  Next Visit: 01-      CARE ([facility])   MEDICINE       Thierno Watson                                                            Last  Test          Frequency    Reason                     Performed    Due Date  --------------------------------------------------------------------------------    CMP.........  12 months..  atorvastatin.............  01- 01-    Lipid Panel.  12 months..  atorvastatin.............  01- 01-    [facility] Embedded Care Due Messages. Reference number: [id number].   11/25/2023 4:05:09 AM CST

## 2023-11-27 RX ORDER — TIZANIDINE 4 MG/1
4 TABLET ORAL EVERY 8 HOURS
Qty: 30 TABLET | Refills: 0 | Status: SHIPPED | OUTPATIENT
Start: 2023-11-27 | End: 2024-02-22

## 2024-01-18 ENCOUNTER — OFFICE VISIT (OUTPATIENT)
Dept: FAMILY MEDICINE | Facility: CLINIC | Age: 74
End: 2024-01-18
Payer: MEDICARE

## 2024-01-18 ENCOUNTER — LAB VISIT (OUTPATIENT)
Dept: LAB | Facility: HOSPITAL | Age: 74
End: 2024-01-18
Attending: STUDENT IN AN ORGANIZED HEALTH CARE EDUCATION/TRAINING PROGRAM
Payer: MEDICARE

## 2024-01-18 VITALS
OXYGEN SATURATION: 96 % | BODY MASS INDEX: 30.03 KG/M2 | DIASTOLIC BLOOD PRESSURE: 78 MMHG | HEIGHT: 71 IN | WEIGHT: 214.5 LBS | HEART RATE: 78 BPM | SYSTOLIC BLOOD PRESSURE: 130 MMHG

## 2024-01-18 DIAGNOSIS — N18.32 STAGE 3B CHRONIC KIDNEY DISEASE: ICD-10-CM

## 2024-01-18 DIAGNOSIS — Z12.11 COLON CANCER SCREENING: Primary | ICD-10-CM

## 2024-01-18 DIAGNOSIS — Z12.5 SCREENING FOR PROSTATE CANCER: ICD-10-CM

## 2024-01-18 DIAGNOSIS — M54.50 CHRONIC LEFT-SIDED LOW BACK PAIN WITHOUT SCIATICA: ICD-10-CM

## 2024-01-18 DIAGNOSIS — G89.29 CHRONIC LEFT-SIDED LOW BACK PAIN WITHOUT SCIATICA: ICD-10-CM

## 2024-01-18 DIAGNOSIS — Z23 NEED FOR VACCINATION: ICD-10-CM

## 2024-01-18 PROBLEM — Z01.818 PREOPERATIVE CLEARANCE: Status: RESOLVED | Noted: 2023-07-18 | Resolved: 2024-01-18

## 2024-01-18 LAB
ALBUMIN SERPL BCP-MCNC: 3.8 G/DL (ref 3.5–5.2)
ALP SERPL-CCNC: 116 U/L (ref 55–135)
ALT SERPL W/O P-5'-P-CCNC: 17 U/L (ref 10–44)
ANION GAP SERPL CALC-SCNC: 11 MMOL/L (ref 8–16)
AST SERPL-CCNC: 24 U/L (ref 10–40)
BILIRUB SERPL-MCNC: 0.4 MG/DL (ref 0.1–1)
BUN SERPL-MCNC: 23 MG/DL (ref 8–23)
CALCIUM SERPL-MCNC: 9.4 MG/DL (ref 8.7–10.5)
CHLORIDE SERPL-SCNC: 111 MMOL/L (ref 95–110)
CO2 SERPL-SCNC: 18 MMOL/L (ref 23–29)
COMPLEXED PSA SERPL-MCNC: 1.9 NG/ML (ref 0–4)
CREAT SERPL-MCNC: 1.8 MG/DL (ref 0.5–1.4)
EST. GFR  (NO RACE VARIABLE): 39.3 ML/MIN/1.73 M^2
GLUCOSE SERPL-MCNC: 100 MG/DL (ref 70–110)
PHOSPHATE SERPL-MCNC: 3 MG/DL (ref 2.7–4.5)
POTASSIUM SERPL-SCNC: 4.3 MMOL/L (ref 3.5–5.1)
PROT SERPL-MCNC: 7.2 G/DL (ref 6–8.4)
PTH-INTACT SERPL-MCNC: 71.7 PG/ML (ref 9–77)
SODIUM SERPL-SCNC: 140 MMOL/L (ref 136–145)

## 2024-01-18 PROCEDURE — G0008 ADMIN INFLUENZA VIRUS VAC: HCPCS | Mod: PBBFAC,PO

## 2024-01-18 PROCEDURE — 90694 VACC AIIV4 NO PRSRV 0.5ML IM: CPT | Mod: PBBFAC,PO

## 2024-01-18 PROCEDURE — 99214 OFFICE O/P EST MOD 30 MIN: CPT | Mod: PBBFAC,PO,25 | Performed by: STUDENT IN AN ORGANIZED HEALTH CARE EDUCATION/TRAINING PROGRAM

## 2024-01-18 PROCEDURE — 36415 COLL VENOUS BLD VENIPUNCTURE: CPT | Mod: PO | Performed by: STUDENT IN AN ORGANIZED HEALTH CARE EDUCATION/TRAINING PROGRAM

## 2024-01-18 PROCEDURE — 84153 ASSAY OF PSA TOTAL: CPT | Performed by: STUDENT IN AN ORGANIZED HEALTH CARE EDUCATION/TRAINING PROGRAM

## 2024-01-18 PROCEDURE — 99999PBSHW FLU VACCINE - QUADRIVALENT - ADJUVANTED: Mod: PBBFAC,,,

## 2024-01-18 PROCEDURE — 80053 COMPREHEN METABOLIC PANEL: CPT | Performed by: STUDENT IN AN ORGANIZED HEALTH CARE EDUCATION/TRAINING PROGRAM

## 2024-01-18 PROCEDURE — 83970 ASSAY OF PARATHORMONE: CPT | Performed by: STUDENT IN AN ORGANIZED HEALTH CARE EDUCATION/TRAINING PROGRAM

## 2024-01-18 PROCEDURE — 85027 COMPLETE CBC AUTOMATED: CPT | Performed by: STUDENT IN AN ORGANIZED HEALTH CARE EDUCATION/TRAINING PROGRAM

## 2024-01-18 PROCEDURE — 90677 PCV20 VACCINE IM: CPT | Mod: PBBFAC,PO

## 2024-01-18 PROCEDURE — 99999 PR PBB SHADOW E&M-EST. PATIENT-LVL IV: CPT | Mod: PBBFAC,,, | Performed by: STUDENT IN AN ORGANIZED HEALTH CARE EDUCATION/TRAINING PROGRAM

## 2024-01-18 PROCEDURE — 99213 OFFICE O/P EST LOW 20 MIN: CPT | Mod: S$PBB,,, | Performed by: STUDENT IN AN ORGANIZED HEALTH CARE EDUCATION/TRAINING PROGRAM

## 2024-01-18 PROCEDURE — 99999PBSHW PNEUMOCOCCAL CONJUGATE VACCINE 20-VALENT: Mod: PBBFAC,,,

## 2024-01-18 PROCEDURE — 84100 ASSAY OF PHOSPHORUS: CPT | Performed by: STUDENT IN AN ORGANIZED HEALTH CARE EDUCATION/TRAINING PROGRAM

## 2024-01-18 NOTE — PROGRESS NOTES
Name: Dayton Faye  MRN: [id number]  : 1950  PCP: Thierno Watson MD    Subjective   Patient presents for regular follow up. He has had a right knee arthroplasty last  and has been through physical therapy. Therapists mentioned establishing with back and spine clinic for his chronic back pain, suspected to be present due to compensation for knee pain.    He doesn't remember when he last had a colonoscopy as his prior GI doctor retired and he can't access those records. They did find polyps on his last 2 colonoscopies.    Review of Systems   Cardiovascular:  Negative for palpitations and leg swelling.   Musculoskeletal:  Positive for back pain.   Neurological:  Negative for dizziness.       Patient Active Problem List   Diagnosis    Essential hypertension    Mixed hyperlipidemia    Gastroesophageal reflux disease    Plantar fasciitis of left foot    Chronic pain of right knee    Muscle spasm    Chronic sinusitis    Lower extremity edema    Dyspnea on exertion    Stage 3a chronic kidney disease    Preoperative clearance       Health Maintenance Due   Topic Date Due    COVID-19 Vaccine (1) Never done    TETANUS VACCINE  Never done    Colorectal Cancer Screening  Never done    Shingles Vaccine (1 of 2) Never done    RSV Vaccine (Age 60+ and Pregnant patients) (1 - 1-dose 60+ series) Never done    Pneumococcal Vaccines (Age 65+) (1 - PCV) Never done    Influenza Vaccine (1) 2023       Objective   Vitals:    24 1102   BP: 130/78   Pulse: 78       Physical Exam  Constitutional:       General: He is not in acute distress.     Appearance: Normal appearance. He is well-developed.   HENT:      Head: Normocephalic and atraumatic.      Right Ear: External ear normal.      Left Ear: External ear normal.   Eyes:      Conjunctiva/sclera: Conjunctivae normal.   Pulmonary:      Effort: Pulmonary effort is normal. No respiratory distress.   Abdominal:      General: Abdomen is flat. There is no distension.    Musculoskeletal:         General: No swelling or deformity.      Right lower leg: No edema.      Left lower leg: No edema.   Skin:     General: Skin is warm and dry.      Coloration: Skin is not jaundiced.   Neurological:      Mental Status: He is alert and oriented to person, place, and time. Mental status is at baseline.   Psychiatric:         Attention and Perception: Attention and perception normal.         Mood and Affect: Mood normal.         Speech: Speech normal.         Behavior: Behavior normal. Behavior is cooperative.         Thought Content: Thought content normal.         Cognition and Memory: Cognition normal.         Judgment: Judgment normal.         Assessment & Plan   1. Colon cancer screening  -     Case Request Endoscopy: COLONOSCOPY    2. Chronic left-sided low back pain without sciatica  -     Ambulatory referral/consult to Back & Spine Clinic; Future; Expected date: 01/25/2024    3. Stage 3b chronic kidney disease  -     COMPREHENSIVE METABOLIC PANEL; Future; Expected date: 01/18/2024  -     CBC Without Differential; Future; Expected date: 01/18/2024  -     PTH, Intact; Future; Expected date: 01/18/2024  -     Phosphorus; Future; Expected date: 01/18/2024    4. Screening for prostate cancer  -     PSA, SCREENING; Future; Expected date: 01/18/2024    5. Need for vaccination  -     (In Office Administered) Pneumococcal Conjugate Vaccine (20 Valent) (IM) (Preferred)  -     Influenza - Quadrivalent (Adjuvanted)        Follow up in 6 months. I spent 20 minutes pre-charting, interviewing patient, performing exam, formulating and discussing plan, placing orders, and documenting.     Thierno Watson MD  01/18/2024

## 2024-01-19 DIAGNOSIS — D63.1 ANEMIA DUE TO STAGE 3B CHRONIC KIDNEY DISEASE: ICD-10-CM

## 2024-01-19 DIAGNOSIS — N18.32 ANEMIA DUE TO STAGE 3B CHRONIC KIDNEY DISEASE: ICD-10-CM

## 2024-01-19 DIAGNOSIS — N18.32 STAGE 3B CHRONIC KIDNEY DISEASE: Primary | ICD-10-CM

## 2024-01-19 LAB
ERYTHROCYTE [DISTWIDTH] IN BLOOD BY AUTOMATED COUNT: 14.7 % (ref 11.5–14.5)
HCT VFR BLD AUTO: 39.1 % (ref 40–54)
HGB BLD-MCNC: 12.5 G/DL (ref 14–18)
MCH RBC QN AUTO: 30.2 PG (ref 27–31)
MCHC RBC AUTO-ENTMCNC: 32 G/DL (ref 32–36)
MCV RBC AUTO: 94 FL (ref 82–98)
PLATELET # BLD AUTO: 224 K/UL (ref 150–450)
PMV BLD AUTO: 11.5 FL (ref 9.2–12.9)
RBC # BLD AUTO: 4.14 M/UL (ref 4.6–6.2)
WBC # BLD AUTO: 6.04 K/UL (ref 3.9–12.7)

## 2024-01-19 RX ORDER — SODIUM BICARBONATE 650 MG/1
650 TABLET ORAL DAILY
Qty: 30 TABLET | Refills: 11 | Status: SHIPPED | OUTPATIENT
Start: 2024-01-19 | End: 2025-01-18

## 2024-01-22 ENCOUNTER — TELEPHONE (OUTPATIENT)
Dept: PAIN MEDICINE | Facility: CLINIC | Age: 74
End: 2024-01-22
Payer: MEDICARE

## 2024-01-22 ENCOUNTER — TELEPHONE (OUTPATIENT)
Dept: GASTROENTEROLOGY | Facility: CLINIC | Age: 74
End: 2024-01-22
Payer: MEDICARE

## 2024-01-22 ENCOUNTER — TELEPHONE (OUTPATIENT)
Dept: NEUROSURGERY | Facility: CLINIC | Age: 74
End: 2024-01-22
Payer: MEDICARE

## 2024-01-22 NOTE — TELEPHONE ENCOUNTER
----- Message from Agnel Greene sent at 1/22/2024 11:18 AM CST -----  Type: Need Medical Advice   Who Called: Patient  Best callback number: [phone]  Additional Information: Patient has a referral and called to schedule an appointment   Please call to further assist, Thanks.

## 2024-01-22 NOTE — TELEPHONE ENCOUNTER
Called patient in regards to referral placed to B&S. No current imaging loaded to chart. No answer. M to call [phone] for appointment scheduling

## 2024-01-23 ENCOUNTER — TELEPHONE (OUTPATIENT)
Dept: FAMILY MEDICINE | Facility: CLINIC | Age: 74
End: 2024-01-23
Payer: MEDICARE

## 2024-01-23 NOTE — TELEPHONE ENCOUNTER
----- Message from Thierno Watson MD sent at 1/23/2024  7:22 AM CST -----  Call patient with results. See result note for details

## 2024-01-23 NOTE — TELEPHONE ENCOUNTER
stated:     Anemia is slightly worse but no concern for active bleed at this time.     Your kidney function is stable. Of note, though, your CO2 is low for the second time in a row - this could indicate that your kidneys are having trouble balancing the acid in your blood. This could lead to further kidney damage over time. I would like to prescribe sodium bicarbonate to help your kidneys. We will re-check your labs in about 3 months.      Called pt and informed   day(s)

## 2024-01-25 NOTE — TELEPHONE ENCOUNTER
Diagnosis is confirmed from the case order.   BMI: 29.92  First Colonoscopy? No (hx of polyps)  Family history of colon cancer? no  Medical issues? CKD  Blood thinners? No  Preferred day: 4/8.    I instruct Mr. De to have a someone he knows (no Uber or taxi) to drive him home after the procedure since he'll be sedated.  I also inform pt the exact arrival time will be provided to him by the surgery center a couple of days to the afternoon prior to the procedure date.  Inform pt I will send prep instructions via [employer] ONLY.   Pt verbalizes understanding to the statements above.

## 2024-02-06 ENCOUNTER — OFFICE VISIT (OUTPATIENT)
Dept: PAIN MEDICINE | Facility: CLINIC | Age: 74
End: 2024-02-06
Payer: MEDICARE

## 2024-02-06 VITALS
SYSTOLIC BLOOD PRESSURE: 144 MMHG | HEART RATE: 83 BPM | DIASTOLIC BLOOD PRESSURE: 86 MMHG | WEIGHT: 215.06 LBS | BODY MASS INDEX: 29.99 KG/M2

## 2024-02-06 DIAGNOSIS — Z98.890 HISTORY OF LUMBAR SURGERY: Primary | ICD-10-CM

## 2024-02-06 DIAGNOSIS — M54.50 CHRONIC LEFT-SIDED LOW BACK PAIN WITHOUT SCIATICA: ICD-10-CM

## 2024-02-06 DIAGNOSIS — M47.816 LUMBAR SPONDYLOSIS: ICD-10-CM

## 2024-02-06 DIAGNOSIS — G89.29 CHRONIC LEFT-SIDED LOW BACK PAIN WITHOUT SCIATICA: ICD-10-CM

## 2024-02-06 PROCEDURE — 99213 OFFICE O/P EST LOW 20 MIN: CPT | Mod: S$PBB,,, | Performed by: PHYSICIAN ASSISTANT

## 2024-02-06 PROCEDURE — 99999 PR PBB SHADOW E&M-EST. PATIENT-LVL V: CPT | Mod: PBBFAC,,, | Performed by: PHYSICIAN ASSISTANT

## 2024-02-06 PROCEDURE — 99215 OFFICE O/P EST HI 40 MIN: CPT | Mod: PBBFAC,PO | Performed by: PHYSICIAN ASSISTANT

## 2024-02-07 ENCOUNTER — HOSPITAL ENCOUNTER (OUTPATIENT)
Dept: RADIOLOGY | Facility: HOSPITAL | Age: 74
Discharge: HOME | End: 2024-02-07
Attending: PHYSICIAN ASSISTANT
Payer: MEDICARE

## 2024-02-07 ENCOUNTER — OFFICE VISIT (OUTPATIENT)
Dept: ORTHOPEDICS | Facility: CLINIC | Age: 74
End: 2024-02-07
Payer: MEDICARE

## 2024-02-07 VITALS — BODY MASS INDEX: 30.1 KG/M2 | WEIGHT: 215 LBS | HEIGHT: 71 IN

## 2024-02-07 DIAGNOSIS — M54.50 CHRONIC LEFT-SIDED LOW BACK PAIN WITHOUT SCIATICA: ICD-10-CM

## 2024-02-07 DIAGNOSIS — Z96.651 STATUS POST TOTAL KNEE REPLACEMENT USING CEMENT, RIGHT: Primary | ICD-10-CM

## 2024-02-07 DIAGNOSIS — M47.816 LUMBAR SPONDYLOSIS: ICD-10-CM

## 2024-02-07 DIAGNOSIS — G89.29 CHRONIC LEFT-SIDED LOW BACK PAIN WITHOUT SCIATICA: ICD-10-CM

## 2024-02-07 DIAGNOSIS — Z98.890 HISTORY OF LUMBAR SURGERY: ICD-10-CM

## 2024-02-07 PROCEDURE — 99213 OFFICE O/P EST LOW 20 MIN: CPT | Mod: PBBFAC,PO | Performed by: ORTHOPAEDIC SURGERY

## 2024-02-07 PROCEDURE — 99213 OFFICE O/P EST LOW 20 MIN: CPT | Mod: S$PBB,,, | Performed by: ORTHOPAEDIC SURGERY

## 2024-02-07 PROCEDURE — 72114 X-RAY EXAM L-S SPINE BENDING: CPT | Mod: TC,PO

## 2024-02-07 PROCEDURE — 99999 PR PBB SHADOW E&M-EST. PATIENT-LVL III: CPT | Mod: PBBFAC,,, | Performed by: ORTHOPAEDIC SURGERY

## 2024-02-07 PROCEDURE — 72114 X-RAY EXAM L-S SPINE BENDING: CPT | Mod: 26,,, | Performed by: RADIOLOGY

## 2024-02-07 NOTE — PROGRESS NOTES
[facility] New Patient Evaluation      Referred by: Dr. Thierno Watson    PCP: Thierno Watson MD    CC:   Chief Complaint   Patient presents with    Back Pain    Low-back Pain          HPI:   Dayton Faye is a 73 y.o. year old male patient who has a past medical history of General anesthetics causing adverse effect in therapeutic use, Hypertension, PONV (postoperative nausea and vomiting), Pre-diabetes, and Retinal detachment. He presents in referral from Dr. Thierno Watson for lower back pain.    He had lumbar spine surgery in 2016 without benefit - L3, L4, L5 fused.  He also had knee arthroplasty in Fall 2023 with PT after.  He has felt chronic lower back for years, but during knee PT he started to feel sharper back pains.  He has sharp shooting left lower lumbar back pain.  Typically very brief and felt with rolling over in bed or transitioning from lay, sit, stand.  For the time he has pain, it stops him from doing the current activity. Denies any radicular leg pain, numbness or tingling.  In the last few weeks, pain has become less frequent.  He takes tizanidine at bedtime and has tried ibuprofen in the past.     Denies bowel/ bladder incontinence.    Past and current medications:  Antineuropathics:  NSAIDs:  ibuprofen in the past  Antidepressants:  Muscle relaxers: tizandine at bedtime  Opioids:  Antiplatelets/Anticoagulants:    Physical Therapy/ Chiropractic care:  Post op knee PT, but no focused lumbar PT    Pain Intervention History:  none    Past Spine Surgical History:  L3,L4,L5 fusion in 2016        History:    Current Outpatient Medications:     atorvastatin (LIPITOR) 20 MG tablet, Take 1 tablet (20 mg total) by mouth once daily., Disp: 90 tablet, Rfl: 3    CLARITIN-D 24 HOUR  mg per 24 hr tablet, Take 1 tablet by mouth., Disp: , Rfl:     coenzyme Q10 100 mg capsule, Take 200 mg by mouth., Disp: , Rfl:     ginkgo biloba 120 mg Tab, Take by mouth once daily., Disp: , Rfl:      krill-om-3-dha-epa-phospho-ast 636-625-90-64 mg Cap, Take by mouth once daily., Disp: , Rfl:     losartan (COZAAR) 100 MG tablet, Take 1 tablet (100 mg total) by mouth once daily., Disp: 90 tablet, Rfl: 3    mv-min-folic acid-lutein 500-250 mcg Chew, Take by mouth., Disp: , Rfl:     pantoprazole (PROTONIX) 40 MG tablet, Take 1 tablet (40 mg total) by mouth once daily., Disp: 90 tablet, Rfl: 3    prednisoLONE acetate (PRED MILD) 0.12 % ophthalmic suspension, Place 1 drop into the left eye every morning., Disp: , Rfl:     sodium bicarbonate 650 MG tablet, Take 1 tablet (650 mg total) by mouth once daily., Disp: 30 tablet, Rfl: 11    tiZANidine (ZANAFLEX) 4 MG tablet, TAKE 1 TABLET(4 MG) BY MOUTH EVERY 8 HOURS (Patient taking differently: Take 4 mg by mouth once daily.), Disp: 30 tablet, Rfl: 0    vit C/E/Zn/coppr/lutein/zeaxan (PRESERVISION AREDS-2 ORAL), Take by mouth 2 (two) times a day., Disp: , Rfl:   No current facility-administered medications for this visit.    Facility-Administered Medications Ordered in Other Visits:     electrolyte-S (ISOLYTE), , Intravenous, Continuous, Sergio Darby MD    electrolyte-S (ISOLYTE), , Intravenous, Continuous, Sergio Darby MD, Stopped at 08/08/23 1405    fentaNYL 50 mcg/mL injection 25 mcg, 25 mcg, Intravenous, Q5 Min PRN, Sergio Darby MD    fentaNYL 50 mcg/mL injection  mcg,  mcg, Intravenous, PRN, Sergio Darby MD    LIDOcaine (PF) 10 mg/ml (1%) injection 10 mg, 1 mL, Intradermal, Once, Sergio Darby MD    midazolam (VERSED) 1 mg/mL injection 2 mg, 2 mg, Intravenous, PRN, Sergio Darby MD    ondansetron injection 4 mg, 4 mg, Intravenous, Once PRN, Sergio Darby MD    oxyCODONE immediate release tablet 5 mg, 5 mg, Oral, Once PRN, Sergio Darby MD    Past Medical History:   Diagnosis Date    General anesthetics causing adverse effect in therapeutic use     takes longer to wake up after anesthesia    Hypertension     PONV  "(postoperative nausea and vomiting)     Pre-diabetes     Retinal detachment        Past Surgical History:   Procedure Laterality Date    BACK SURGERY      CATARACT EXTRACTION Bilateral     EYE SURGERY      HIP SURGERY Right     JOINT REPLACEMENT  2016    Right hip joint replaced    KNEE SURGERY Left     RETINAL DETACHMENT SURGERY Left     ROBOTIC ARTHROPLASTY, KNEE Right 2023    Procedure: ROBOTIC ARTHROPLASTY, KNEE, TOTAL;  Surgeon: Fili Scott MD;  Location: [facility];  Service: Orthopedics;  Laterality: Right;    SHOULDER SURGERY Right     SHOULDER SURGERY Left     SPINE SURGERY      lumbar fusion       Family History   Problem Relation Age of Onset    Macular degeneration Father     Heart disease Father     Arthritis Father          at age 92    Hypertension Father     Vision loss Father     No Known Problems Sister     No Known Problems Brother     Cancer Paternal Grandmother         Lung    Cancer Paternal Grandfather         Prostate    Glaucoma Neg Hx        Social History     Socioeconomic History    Marital status:    Occupational History    Occupation: School psychologist     Comment: retired   Tobacco Use    Smoking status: Former     Current packs/day: 0.00     Average packs/day: 0.5 packs/day for 5.0 years (2.5 ttl pk-yrs)     Types: Cigarettes, Pipe     Start date: 1986     Quit date: 1991     Years since quittin.1    Smokeless tobacco: Never   Substance and Sexual Activity    Alcohol use: Not Currently     Comment: Very occasionally    Drug use: Never    Sexual activity: Yes     Partners: Female     Birth control/protection: Post-menopausal   Social History Narrative    Lives with female partner.  Moved here from [place]. Two cats. Enjoys ancestry, reading.       Review of patient's allergies indicates:   Allergen Reactions    Adhesive Rash     Patients states "surgical tape."    Latex, natural rubber Rash       Labs:  Lab Results   Component Value Date    " HGBA1C 5.2 09/24/2021       Lab Results   Component Value Date    WBC 6.04 01/18/2024    HGB 12.5 (L) 01/18/2024    HCT 39.1 (L) 01/18/2024    MCV 94 01/18/2024     01/18/2024           Review of Systems:  Lower back pain..  Balance of review of systems is negative.    Physical Exam:  Vitals:    02/06/24 0906   BP: (!) 144/86   Pulse: 83   Weight: 97.6 kg (215 lb 0.9 oz)   PainSc:   1   PainLoc: Back     Body mass index is 29.99 kg/m².    Pain disability index:      2/6/2024     9:08 AM   Last 3 PDI Scores   Pain Disability Index (PDI) 41       Gen: NAD  Psych: mood appropriate for given condition  HEENT: eyes anicteric   CV: RRR, 2+ radial pulse  Respiratory: non-labored, no signs of respiratory distress  Abd: non-distended  Skin: warm, dry and intact.  Gait: Able to heel walk, toe walk. No antalgic gait.     Coordination:   Tandem walking coordination: normal    Cervical spine: ROM is full in flexion, extension and lateral rotation without increased pain.  Spurling's maneuver causes no neck pain to either side.  Myofascial exam: No Tenderness to palpation across cervical paraspinous region bilaterally.    Lumbar spine:  Lumbar spine: ROM is full with flexion extension and oblique extension with no increased pain.    Luis's test causes no increased pain on either side.    Supine straight leg raise is negative bilaterally.    Internal and external rotation of the hip causes no increased pain on either side.  Myofascial exam: No tenderness to palpation across lumbar paraspinous muscles. No tenderness to palpation over the bilateral greater trochanters and bilateral SI joint    Sensory:  Intact and symmetrical to light touch in C4-T1 dermatomes bilaterally. Intact and symmetrical to light touch in L1-S1 dermatomes bilaterally.    Motor:    Right Left   C4 Shoulder Abduction  5  5   C5 Elbow Flexion    5  5   C6 Wrist Extension  5  5   C7 Elbow Extension   5  5   C8/T1 Hand Intrinsics   5  5        Right Left    L2/3 Iliacus Hip flexion  5  5   L3/4 Qudratus Femoris Knee Extension  5  5   L4/5 Tib Anterior Ankle Dorsiflexion   5  5   L5/S1 Extensor Hallicus Longus Great toe extension  5  5   S1/S2 Gastroc/Soleus Plantar Flexion  5  5      Right Left   Triceps DTR 2+ 2+   Biceps DTR 2+ 2+   Brachioradialis DTR 2+ 2+   Patellar DTR 2+ 2+   Achilles DTR 2+ 2+   Hyde Absent  Absent   Clonus Absent Absent   Babinski Absent Absent       Imaging:  Last MRI was pre-operatively.  MRI lumbar spine report from 2015 in [place] describes L4/5 spondylosis, anterolisthesis with severe central canal stenosis.    Assessment:   Dayton Faye is a 73 y.o. year old male patient who has a past medical history of General anesthetics causing adverse effect in therapeutic use, Hypertension, PONV (postoperative nausea and vomiting), Pre-diabetes, and Retinal detachment. He presents in referral from Dr. Thierno Watson for chronic intermittent brief left lumbar back pain.  No radicular pain, numbness or tingling.      With pain so brief and movement dependant with no radiculopahty it is most likely myofascial/ mechanical.  Cannot rule out surgical complication from hardware.    Plan:  - given history of surgery, recommend xray lumbar spine to further assess and rule out any structural issues or surgical complication.  Will call with results.  - recommend home exercise to help with intermittent back pain.  Instructions given for exercise.  - possibley consider PT if no major issue noted on xray.    Problem List Items Addressed This Visit       Chronic left-sided low back pain without sciatica    Relevant Orders    X-Ray Lumbar Complete Including Flex And Ext     Other Visit Diagnoses       History of lumbar surgery    -  Primary    Relevant Orders    X-Ray Lumbar Complete Including Flex And Ext    Lumbar spondylosis        Relevant Orders    X-Ray Lumbar Complete Including Flex And Ext            Follow Up: RTC as needed; will notify of xray  results.     : Reviewed and consistent with medication use as prescribed.    Thank you for referring this interesting patient, and I look forward to continuing to collaborate in his care.        Katherin Smith PA-C  [facility]

## 2024-02-07 NOTE — PROGRESS NOTES
Past Medical History:   Diagnosis Date    General anesthetics causing adverse effect in therapeutic use     takes longer to wake up after anesthesia    Hypertension     PONV (postoperative nausea and vomiting)     Pre-diabetes     Retinal detachment        Past Surgical History:   Procedure Laterality Date    BACK SURGERY      CATARACT EXTRACTION Bilateral     EYE SURGERY      HIP SURGERY Right     JOINT REPLACEMENT  2016    Right hip joint replaced    KNEE SURGERY Left     RETINAL DETACHMENT SURGERY Left     ROBOTIC ARTHROPLASTY, KNEE Right 8/8/2023    Procedure: ROBOTIC ARTHROPLASTY, KNEE, TOTAL;  Surgeon: Fili Scott MD;  Location: [facility];  Service: Orthopedics;  Laterality: Right;    SHOULDER SURGERY Right     SHOULDER SURGERY Left     SPINE SURGERY      lumbar fusion       Current Outpatient Medications   Medication Sig    atorvastatin (LIPITOR) 20 MG tablet Take 1 tablet (20 mg total) by mouth once daily.    CLARITIN-D 24 HOUR  mg per 24 hr tablet Take 1 tablet by mouth.    coenzyme Q10 100 mg capsule Take 200 mg by mouth.    ginkgo biloba 120 mg Tab Take by mouth once daily.    krill-om-3-dha-epa-phospho-ast 139-248-36-64 mg Cap Take by mouth once daily.    losartan (COZAAR) 100 MG tablet Take 1 tablet (100 mg total) by mouth once daily.    mv-min-folic acid-lutein 500-250 mcg Chew Take by mouth.    pantoprazole (PROTONIX) 40 MG tablet Take 1 tablet (40 mg total) by mouth once daily.    prednisoLONE acetate (PRED MILD) 0.12 % ophthalmic suspension Place 1 drop into the left eye every morning.    sodium bicarbonate 650 MG tablet Take 1 tablet (650 mg total) by mouth once daily.    tiZANidine (ZANAFLEX) 4 MG tablet TAKE 1 TABLET(4 MG) BY MOUTH EVERY 8 HOURS (Patient taking differently: Take 4 mg by mouth once daily.)    vit C/E/Zn/coppr/lutein/zeaxan (PRESERVISION AREDS-2 ORAL) Take by mouth 2 (two) times a day.     No current facility-administered medications for this visit.  "    Facility-Administered Medications Ordered in Other Visits   Medication    electrolyte-S (ISOLYTE)    electrolyte-S (ISOLYTE)    fentaNYL 50 mcg/mL injection 25 mcg    fentaNYL 50 mcg/mL injection  mcg    LIDOcaine (PF) 10 mg/ml (1%) injection 10 mg    midazolam (VERSED) 1 mg/mL injection 2 mg    ondansetron injection 4 mg    oxyCODONE immediate release tablet 5 mg       Review of patient's allergies indicates:   Allergen Reactions    Adhesive Rash     Patients states "surgical tape."    Latex, natural rubber Rash       Family History   Problem Relation Age of Onset    Macular degeneration Father     Heart disease Father     Arthritis Father          at age 92    Hypertension Father     Vision loss Father     No Known Problems Sister     No Known Problems Brother     Cancer Paternal Grandmother         Lung    Cancer Paternal Grandfather         Prostate    Glaucoma Neg Hx        Social History     Socioeconomic History    Marital status:    Occupational History    Occupation: School psychologist     Comment: retired   Tobacco Use    Smoking status: Former     Current packs/day: 0.00     Average packs/day: 0.5 packs/day for 5.0 years (2.5 ttl pk-yrs)     Types: Cigarettes, Pipe     Start date: 1986     Quit date: 1991     Years since quittin.1    Smokeless tobacco: Never   Substance and Sexual Activity    Alcohol use: Not Currently     Comment: Very occasionally    Drug use: Never    Sexual activity: Yes     Partners: Female     Birth control/protection: Post-menopausal   Social History Narrative    Lives with female partner.  Moved here from [place]. Two cats. Enjoys ancestry, reading.       Chief Complaint:   Chief Complaint   Patient presents with    Right Knee - Pain     TKA F/U       Date of surgery:23    History of present illness:  73-year-old male underwent right robotic assisted total knee arthroplasty.  He is doing very well.  Mild achy pain but just occasionally.  No " pain today.      Review of Systems:    Musculoskeletal:  See HPI        Physical Examination:    Vital Signs:    There were no vitals filed for this visit.      Body mass index is 29.99 kg/m².    This a well-developed, well nourished patient in no acute distress.  They are alert and oriented and cooperative to examination.  Pt. walks without an antalgic gait.      Examination right knee shows well-healed surgical incision .  No erythema drainage. No calf pain.  Range of motion about 0-120 degrees.  Knee is stable to varus and valgus stress.  Negative instability with drawer exam.    X-rays:  Four views of both knees are ordered and reviewed which show well-aligned bilateral total knee arthroplasty components without complication     Assessment::  Status post right robotic assisted total knee arthroplasty using cement    Plan:   Continue with the outpatient physical therapy.  I will see him in August for annual exam of both knees.  We will get x-rays of both knees at that time.    This note was created using M Modal voice recognition software that occasionally misinterpreted phrases or words.

## 2024-02-09 ENCOUNTER — TELEPHONE (OUTPATIENT)
Dept: PAIN MEDICINE | Facility: CLINIC | Age: 74
End: 2024-02-09
Payer: MEDICARE

## 2024-02-09 NOTE — TELEPHONE ENCOUNTER
----- Message from Katherin Smith PA-C sent at 2/8/2024 11:46 AM CST -----  Results Reviewed.  Please call with below:    I have reviewed the xrays of the lower back.  It shows the surgical changes with fusion from L3 to L5.  There are no hardware complications and no instablity in the spine.  There are some expected arthritic changes at L5/S1.  If you want to try PT to help with pain, it is ok and we can put an order in.

## 2024-02-22 DIAGNOSIS — M62.838 MUSCLE SPASM: ICD-10-CM

## 2024-02-22 RX ORDER — TIZANIDINE 4 MG/1
4 TABLET ORAL EVERY 8 HOURS
Qty: 30 TABLET | Refills: 0 | Status: SHIPPED | OUTPATIENT
Start: 2024-02-22 | End: 2024-03-26

## 2024-02-22 NOTE — TELEPHONE ENCOUNTER
Care Due:                  Date            Visit Type   Department     Provider  --------------------------------------------------------------------------------                                EP -                              [facility] FAMILY  Last Visit: 01-      CARE ([facility])   MEDICINE       Thierno Watson                              EP -                              PRIMARY      [facility] FAMILY  Next Visit: 07-      CARE ([facility])   MEDICINE       hTierno Watson                                                            Last  Test          Frequency    Reason                     Performed    Due Date  --------------------------------------------------------------------------------    Lipid Panel.  12 months..  atorvastatin.............  01- 01-    Health [facility] Embedded Care Due Messages. Reference number: [id number].   2/22/2024 2:20:54 PM CST

## 2024-02-27 DIAGNOSIS — Z00.00 ENCOUNTER FOR MEDICARE ANNUAL WELLNESS EXAM: ICD-10-CM

## 2024-03-04 DIAGNOSIS — E78.2 MIXED HYPERLIPIDEMIA: ICD-10-CM

## 2024-03-04 RX ORDER — ATORVASTATIN CALCIUM 20 MG/1
20 TABLET, FILM COATED ORAL
Qty: 90 TABLET | Refills: 3 | Status: SHIPPED | OUTPATIENT
Start: 2024-03-04

## 2024-03-04 NOTE — TELEPHONE ENCOUNTER
No care due was identified.  [facility] Embedded Care Due Messages. Reference number: [id number].   3/04/2024 9:16:57 AM CST

## 2024-03-04 NOTE — TELEPHONE ENCOUNTER
Refill Routing Note   Medication(s) are not appropriate for processing by [facility] for the following reason(s):        Required labs outdated    [facility] action(s):  Defer               Appointments  past 12m or future 3m with PCP    Date Provider   Last Visit   1/18/2024 Thierno Watson MD   Next Visit   7/18/2024 Thierno Watson MD   ED visits in past 90 days: 0        Note composed:3:55 PM 03/04/2024

## 2024-03-26 DIAGNOSIS — M62.838 MUSCLE SPASM: ICD-10-CM

## 2024-03-26 RX ORDER — TIZANIDINE 4 MG/1
4 TABLET ORAL EVERY 8 HOURS
Qty: 30 TABLET | Refills: 5 | Status: SHIPPED | OUTPATIENT
Start: 2024-03-26

## 2024-03-26 NOTE — TELEPHONE ENCOUNTER
No care due was identified.  [facility] Embedded Care Due Messages. Reference number: [id number].   3/26/2024 2:28:11 AM CDT

## 2024-04-07 NOTE — H&P
"COLONOSCOPY HISTORY AND PE    Procedure : Colonoscopy      INDICATIONS: asymptomatic screening exam      Past Medical History:   Diagnosis Date    Arthritis     Cancer     skin - head, face    Diabetes mellitus     "pre-diabetes" per pt    General anesthetics causing adverse effect in therapeutic use     takes longer to wake up after anesthesia    GERD (gastroesophageal reflux disease)     Hypertension     PONV (postoperative nausea and vomiting)     Pre-diabetes     Retinal detachment        Past Surgical History:   Procedure Laterality Date    BACK SURGERY      CATARACT EXTRACTION Bilateral     EYE SURGERY      HIP SURGERY Right     JOINT REPLACEMENT  2016    Right hip joint replaced    KNEE SURGERY Left     RETINAL DETACHMENT SURGERY Left     ROBOTIC ARTHROPLASTY, KNEE Right 8/8/2023    Procedure: ROBOTIC ARTHROPLASTY, KNEE, TOTAL;  Surgeon: Fili Scott MD;  Location: [facility];  Service: Orthopedics;  Laterality: Right;    SHOULDER SURGERY Right     SHOULDER SURGERY Left     SPINE SURGERY      lumbar fusion       Review of patient's allergies indicates:   Allergen Reactions    Adhesive Rash     Patients states "surgical tape."    Latex, natural rubber Rash       Current Facility-Administered Medications on File Prior to Encounter   Medication Dose Route Frequency Provider Last Rate Last Admin    electrolyte-S (ISOLYTE)   Intravenous Continuous Sergio Darby MD        electrolyte-S (ISOLYTE)   Intravenous Continuous CouSergio fajardo MD   Stopped at 08/08/23 1405    fentaNYL 50 mcg/mL injection 25 mcg  25 mcg Intravenous Q5 Min PRN Sergio Darby MD        fentaNYL 50 mcg/mL injection  mcg   mcg Intravenous PRN Sergio Darby MD        LIDOcaine (PF) 10 mg/ml (1%) injection 10 mg  1 mL Intradermal Once Sergio Darby MD        midazolam (VERSED) 1 mg/mL injection 2 mg  2 mg Intravenous PRN Sergio Darby MD        ondansetron injection 4 mg  4 mg Intravenous Once PRN " Sergio Darby MD        oxyCODONE immediate release tablet 5 mg  5 mg Oral Once PRN Sergio Darby MD         Current Outpatient Medications on File Prior to Encounter   Medication Sig Dispense Refill    CLARITIN-D 24 HOUR  mg per 24 hr tablet Take 1 tablet by mouth.      coenzyme Q10 100 mg capsule Take 200 mg by mouth.      ginkgo biloba 120 mg Tab Take by mouth once daily.      krill-om-3-dha-epa-phospho-ast 226-637-09-64 mg Cap Take by mouth once daily.      losartan (COZAAR) 100 MG tablet Take 1 tablet (100 mg total) by mouth once daily. 90 tablet 3    mv-min-folic acid-lutein 500-250 mcg Chew Take by mouth.      pantoprazole (PROTONIX) 40 MG tablet Take 1 tablet (40 mg total) by mouth once daily. 90 tablet 3    prednisoLONE acetate (PRED MILD) 0.12 % ophthalmic suspension Place 1 drop into the left eye every morning.      vit C/E/Zn/coppr/lutein/zeaxan (PRESERVISION AREDS-2 ORAL) Take by mouth 2 (two) times a day.         Family History   Problem Relation Age of Onset    Macular degeneration Father     Heart disease Father     Arthritis Father          at age 92    Hypertension Father     Vision loss Father     No Known Problems Sister     No Known Problems Brother     Cancer Paternal Grandmother         Lung    Cancer Paternal Grandfather         Prostate    Glaucoma Neg Hx        Social History     Socioeconomic History    Marital status:    Occupational History    Occupation: School psychologist     Comment: retired   Tobacco Use    Smoking status: Former     Current packs/day: 0.00     Average packs/day: 0.5 packs/day for 5.0 years (2.5 ttl pk-yrs)     Types: Cigarettes, Pipe     Start date: 1986     Quit date: 1991     Years since quittin.2    Smokeless tobacco: Never   Substance and Sexual Activity    Alcohol use: Yes     Comment: Very occasionally    Drug use: Never    Sexual activity: Yes     Partners: Female     Birth control/protection: Post-menopausal   Social  History Narrative    Lives with female partner.  Moved here from [place]. Two cats. Enjoys ancestry, reading.       Review of Systems -    Respiratory : no cough, shortness of breath, or wheezing  Cardiovascular  no chest pain or dyspnea on exertion  Gastrointestinal no abdominal pain, change in bowel habits, or black or bloody stools  Musculoskeletal no deformities, swelling  Neurological no TIA or stroke symptoms        Physical Exam:  General: NAD  AT NC EOMI  Mallampati Score   Neck supple, trachea midline  Lungs: nl excursions, no retractions.  Breathing comfortably  Abdomen ND soft NT.  No masses  Extremities: No CCE.      ASA:  II    PLAN  COLONOSCOPY.  The details of the procedure, the possible need for biopsy or polypectomy and the potential risks including bleeding, perforation, missed polyps were discussed in detail.

## 2024-04-08 ENCOUNTER — ANESTHESIA (OUTPATIENT)
Dept: ENDOSCOPY | Facility: HOSPITAL | Age: 74
End: 2024-04-08
Payer: MEDICARE

## 2024-04-08 ENCOUNTER — ANESTHESIA EVENT (OUTPATIENT)
Dept: ENDOSCOPY | Facility: HOSPITAL | Age: 74
End: 2024-04-08
Payer: MEDICARE

## 2024-04-08 ENCOUNTER — HOSPITAL ENCOUNTER (OUTPATIENT)
Facility: HOSPITAL | Age: 74
Discharge: HOME | End: 2024-04-08
Attending: COLON & RECTAL SURGERY | Admitting: COLON & RECTAL SURGERY
Payer: MEDICARE

## 2024-04-08 DIAGNOSIS — Z12.11 SCREEN FOR COLON CANCER: ICD-10-CM

## 2024-04-08 PROBLEM — K57.90 DIVERTICULOSIS: Status: ACTIVE | Noted: 2024-04-08

## 2024-04-08 PROCEDURE — 63600175 PHARM REV CODE 636 W HCPCS: Mod: PO | Performed by: COLON & RECTAL SURGERY

## 2024-04-08 PROCEDURE — 25000003 PHARM REV CODE 250: Mod: PO | Performed by: NURSE ANESTHETIST, CERTIFIED REGISTERED

## 2024-04-08 PROCEDURE — 37000008 HC ANESTHESIA 1ST 15 MINUTES: Mod: PO | Performed by: COLON & RECTAL SURGERY

## 2024-04-08 PROCEDURE — 63600175 PHARM REV CODE 636 W HCPCS: Mod: PO | Performed by: NURSE ANESTHETIST, CERTIFIED REGISTERED

## 2024-04-08 PROCEDURE — 37000009 HC ANESTHESIA EA ADD 15 MINS: Mod: PO | Performed by: COLON & RECTAL SURGERY

## 2024-04-08 PROCEDURE — G0105 COLORECTAL SCRN; HI RISK IND: HCPCS | Mod: ,,, | Performed by: COLON & RECTAL SURGERY

## 2024-04-08 PROCEDURE — D9220A PRA ANESTHESIA: Mod: ANES,,, | Performed by: ANESTHESIOLOGY

## 2024-04-08 PROCEDURE — D9220A PRA ANESTHESIA: Mod: CRNA,,, | Performed by: NURSE ANESTHETIST, CERTIFIED REGISTERED

## 2024-04-08 PROCEDURE — G0105 COLORECTAL SCRN; HI RISK IND: HCPCS | Mod: PO | Performed by: COLON & RECTAL SURGERY

## 2024-04-08 RX ORDER — PROPOFOL 10 MG/ML
VIAL (ML) INTRAVENOUS
Status: DISCONTINUED | OUTPATIENT
Start: 2024-04-08 | End: 2024-04-08

## 2024-04-08 RX ORDER — SODIUM CHLORIDE, SODIUM LACTATE, POTASSIUM CHLORIDE, CALCIUM CHLORIDE 600; 310; 30; 20 MG/100ML; MG/100ML; MG/100ML; MG/100ML
INJECTION, SOLUTION INTRAVENOUS CONTINUOUS
Status: DISCONTINUED | OUTPATIENT
Start: 2024-04-08 | End: 2024-04-08 | Stop reason: HOSPADM

## 2024-04-08 RX ORDER — PROPOFOL 10 MG/ML
VIAL (ML) INTRAVENOUS CONTINUOUS PRN
Status: DISCONTINUED | OUTPATIENT
Start: 2024-04-08 | End: 2024-04-08

## 2024-04-08 RX ORDER — SODIUM CHLORIDE 0.9 % (FLUSH) 0.9 %
10 SYRINGE (ML) INJECTION
Status: DISCONTINUED | OUTPATIENT
Start: 2024-04-08 | End: 2024-04-08 | Stop reason: HOSPADM

## 2024-04-08 RX ORDER — LIDOCAINE HYDROCHLORIDE 20 MG/ML
INJECTION INTRAVENOUS
Status: DISCONTINUED | OUTPATIENT
Start: 2024-04-08 | End: 2024-04-08

## 2024-04-08 RX ADMIN — PROPOFOL 100 MG: 10 INJECTION, EMULSION INTRAVENOUS at 10:04

## 2024-04-08 RX ADMIN — LIDOCAINE HYDROCHLORIDE 100 MG: 20 INJECTION INTRAVENOUS at 10:04

## 2024-04-08 RX ADMIN — PROPOFOL 150 MCG/KG/MIN: 10 INJECTION, EMULSION INTRAVENOUS at 10:04

## 2024-04-08 RX ADMIN — SODIUM CHLORIDE, POTASSIUM CHLORIDE, SODIUM LACTATE AND CALCIUM CHLORIDE: 600; 310; 30; 20 INJECTION, SOLUTION INTRAVENOUS at 09:04

## 2024-04-08 NOTE — PROVATION PATIENT INSTRUCTIONS
Discharge Summary/Instructions after an Endoscopic Procedure  Patient Name: Dayton Faye  Patient MRN: [id number]  Patient YOB: 1950 Monday, April 8, 2024  Nnamdi Mccabe MD  Dear patient,  As a result of recent federal legislation (The Federal Cures Act), you may   receive lab or pathology results from your procedure in your [facility]   account before your physician is able to contact you. Your physician or   their representative will relay the results to you with their   recommendations at their soonest availability.  Thank you,  RESTRICTIONS:  During your procedure today, you received medications for sedation.  These   medications may affect your judgment, balance and coordination.  Therefore,   for 24 hours, you have the following restrictions:   - DO NOT drive a car, operate machinery, make legal/financial decisions,   sign important papers or drink alcohol.    ACTIVITY:  Today: no heavy lifting, straining or running due to procedural   sedation/anesthesia.  The following day: return to full activity including work.  DIET:  Eat and drink normally unless instructed otherwise.     TREATMENT FOR COMMON SIDE EFFECTS:  - Mild abdominal pain, nausea, belching, bloating or excessive gas:  rest,   eat lightly and use a heating pad.  - Sore Throat: treat with throat lozenges and/or gargle with warm salt   water.  - Because air was used during the procedure, expelling large amounts of air   from your rectum or belching is normal.  - If a bowel prep was taken, you may not have a bowel movement for 1-3 days.    This is normal.  SYMPTOMS TO WATCH FOR AND REPORT TO YOUR PHYSICIAN:  1. Abdominal pain or bloating, other than gas cramps.  2. Chest pain.  3. Back pain.  4. Signs of infection such as: chills or fever occurring within 24 hours   after the procedure.  5. Rectal bleeding, which would show as bright red, maroon, or black stools.   (A tablespoon of blood from the rectum is not serious, especially if    hemorrhoids are present.)  6. Vomiting.  7. Weakness or dizziness.  GO DIRECTLY TO THE NEAREST EMERGENCY ROOM IF YOU HAVE ANY OF THE FOLLOWING:      Difficulty breathing              Chills and/or fever over 101 F   Persistent vomiting and/or vomiting blood   Severe abdominal pain   Severe chest pain   Black, tarry stools   Bleeding- more than one tablespoon   Any other symptom or condition that you feel may need urgent attention  Your doctor recommends these additional instructions:  If any biopsies were taken, your doctors clinic will contact you in 1 to 2   weeks with any results.  You are being discharged to home.   You have a contact number available for emergencies.  The signs and symptoms   of potential delayed complications were discussed with you.  You may return   to normal activities tomorrow.  Written discharge instructions were   provided to you.   Resume your previous diet.   Continue your present medications.   Your physician has recommended a repeat colonoscopy in seven years for   surveillance.  For questions, problems or results please call your physician - Nnamdi Mccabe MD at Work:  [phone].  EMERGENCY PHONE NUMBER: [phone], LAB RESULTS: [phone]  IF A COMPLICATION OR EMERGENCY SITUATION ARISES AND YOU ARE UNABLE TO REACH   YOUR PHYSICIAN - GO DIRECTLY TO THE EMERGENCY ROOM.  ___________________________________________  Nurse Signature  ___________________________________________  Patient/Designated Responsible Party Signature  Nnamdi Mccabe MD  4/8/2024 10:46:47 AM  This report has been verified and signed electronically.  Dear patient,  As a result of recent federal legislation (The Federal Cures Act), you may   receive lab or pathology results from your procedure in your [facility]   account before your physician is able to contact you. Your physician or   their representative will relay the results to you with their   recommendations at their soonest availability.  Thank  you.  PROVATION

## 2024-04-08 NOTE — TRANSFER OF CARE
"Anesthesia Transfer of Care Note    Patient: Dayton Faye    Procedure(s) Performed: Procedure(s) (LRB):  COLONOSCOPY (N/A)    Patient location: PACU    Anesthesia Type: general    Transport from OR: Transported from OR on room air with adequate spontaneous ventilation    Post pain: adequate analgesia    Post assessment: no apparent anesthetic complications and tolerated procedure well    Post vital signs: stable    Level of consciousness: sedated    Nausea/Vomiting: no nausea/vomiting    Complications: none    Transfer of care protocol was followed    Last vitals: Visit Vitals  /82 (BP Location: Right arm, Patient Position: Lying)   Pulse 74   Temp 36.4 °C (97.5 °F) (Temporal)   Resp 15   Ht 5' 11" (1.803 m)   Wt 99.8 kg (220 lb)   SpO2 97%   BMI 30.68 kg/m²     "

## 2024-04-08 NOTE — ANESTHESIA POSTPROCEDURE EVALUATION
Anesthesia Post Evaluation    Patient: Dayton Faye    Procedure(s) Performed: Procedure(s) (LRB):  COLONOSCOPY (N/A)    Final Anesthesia Type: general      Patient location during evaluation: PACU  Patient participation: Yes- Able to Participate  Level of consciousness: awake and alert  Post-procedure vital signs: reviewed and stable  Pain management: adequate  Airway patency: patent    PONV status at discharge: No PONV  Anesthetic complications: no      Cardiovascular status: hemodynamically stable  Respiratory status: unassisted and room air  Hydration status: euvolemic  Follow-up not needed.              Vitals Value Taken Time   /87 04/08/24 1114   Temp 36.6 °C (97.8 °F) 04/08/24 1047   Pulse 74 04/08/24 1114   Resp 16 04/08/24 1114   SpO2 98 % 04/08/24 1114         Event Time   Out of Recovery 11:20:00         Pain/Sahra Score: Sahra Score: 5 (4/8/2024 10:47 AM)

## 2024-04-08 NOTE — ANESTHESIA PREPROCEDURE EVALUATION
04/08/2024  Dayton Faye is a 73 y.o., male.      Pre-op Assessment    I have reviewed the Patient Summary Reports.     I have reviewed the Nursing Notes. I have reviewed the NPO Status.   I have reviewed the Medications.     Review of Systems  Cardiovascular:     Hypertension                     Shortness of Breath                    Hypertension         Pulmonary:      Shortness of breath                  Renal/:  Chronic Renal Disease        Kidney Function/Disease             Hepatic/GI:  Bowel Prep.   GERD      Gerd          Endocrine:  Diabetes    Diabetes                    Obesity / BMI > 30      Physical Exam  General: Well nourished, Cooperative, Alert and Oriented    Airway:  Mallampati: II   Mouth Opening: Normal  TM Distance: Normal    Dental:  Intact    Chest/Lungs:  Normal Respiratory Rate    Heart:  Rate: Normal        Anesthesia Plan  Type of Anesthesia, risks & benefits discussed:    Anesthesia Type: Gen Natural Airway  Intra-op Monitoring Plan: Standard ASA Monitors  Induction:  IV  Informed Consent: Informed consent signed with the Patient and all parties understand the risks and agree with anesthesia plan.  All questions answered.   ASA Score: 3    Ready For Surgery From Anesthesia Perspective.     .

## 2024-04-09 VITALS
DIASTOLIC BLOOD PRESSURE: 87 MMHG | RESPIRATION RATE: 16 BRPM | BODY MASS INDEX: 30.8 KG/M2 | SYSTOLIC BLOOD PRESSURE: 132 MMHG | HEART RATE: 74 BPM | HEIGHT: 71 IN | WEIGHT: 220 LBS | OXYGEN SATURATION: 98 % | TEMPERATURE: 98 F

## 2024-04-14 DIAGNOSIS — K21.9 GASTROESOPHAGEAL REFLUX DISEASE, UNSPECIFIED WHETHER ESOPHAGITIS PRESENT: ICD-10-CM

## 2024-04-14 RX ORDER — PANTOPRAZOLE SODIUM 40 MG/1
40 TABLET, DELAYED RELEASE ORAL
Qty: 90 TABLET | Refills: 3 | Status: SHIPPED | OUTPATIENT
Start: 2024-04-14

## 2024-04-14 NOTE — TELEPHONE ENCOUNTER
No care due was identified.  [facility] Embedded Care Due Messages. Reference number: [id number].   4/14/2024 6:42:05 AM CDT

## 2024-04-15 NOTE — TELEPHONE ENCOUNTER
Refill Decision Note   Dayton Faye  is requesting a refill authorization.  Brief Assessment and Rationale for Refill:  Approve     Medication Therapy Plan:       Medication Reconciliation Completed: No   Comments:     No Care Gaps recommended.     Note composed:7:55 PM 04/14/2024

## 2024-04-22 ENCOUNTER — PATIENT MESSAGE (OUTPATIENT)
Dept: FAMILY MEDICINE | Facility: CLINIC | Age: 74
End: 2024-04-22
Payer: MEDICARE

## 2024-05-02 NOTE — PLAN OF CARE
Patient cleared by PT to discharge to home.  Patient able to void with no problems noted.  Walker for home use returned to patient.  Meds delivered to patient/ family.  Discharge instructions given to pt and family/friend, verbalized understanding and questions answered. Handouts provided. Belongings given back to pt. IV removed- catheter intact. Discharge via wheelchair.    Resident

## 2024-05-14 ENCOUNTER — LAB VISIT (OUTPATIENT)
Dept: LAB | Facility: HOSPITAL | Age: 74
End: 2024-05-14
Attending: STUDENT IN AN ORGANIZED HEALTH CARE EDUCATION/TRAINING PROGRAM
Payer: MEDICARE

## 2024-05-14 DIAGNOSIS — D63.1 ANEMIA DUE TO STAGE 3B CHRONIC KIDNEY DISEASE: ICD-10-CM

## 2024-05-14 DIAGNOSIS — N18.32 ANEMIA DUE TO STAGE 3B CHRONIC KIDNEY DISEASE: ICD-10-CM

## 2024-05-14 DIAGNOSIS — N18.32 STAGE 3B CHRONIC KIDNEY DISEASE: ICD-10-CM

## 2024-05-14 LAB
ANION GAP SERPL CALC-SCNC: 9 MMOL/L (ref 8–16)
BUN SERPL-MCNC: 22 MG/DL (ref 8–23)
CALCIUM SERPL-MCNC: 9.1 MG/DL (ref 8.7–10.5)
CHLORIDE SERPL-SCNC: 108 MMOL/L (ref 95–110)
CO2 SERPL-SCNC: 23 MMOL/L (ref 23–29)
CREAT SERPL-MCNC: 1.9 MG/DL (ref 0.5–1.4)
ERYTHROCYTE [DISTWIDTH] IN BLOOD BY AUTOMATED COUNT: 14.6 % (ref 11.5–14.5)
EST. GFR  (NO RACE VARIABLE): 36.8 ML/MIN/1.73 M^2
FERRITIN SERPL-MCNC: 48 NG/ML (ref 20–300)
GLUCOSE SERPL-MCNC: 87 MG/DL (ref 70–110)
HCT VFR BLD AUTO: 42.7 % (ref 40–54)
HGB BLD-MCNC: 13.3 G/DL (ref 14–18)
IRON SERPL-MCNC: 105 UG/DL (ref 45–160)
MCH RBC QN AUTO: 30.9 PG (ref 27–31)
MCHC RBC AUTO-ENTMCNC: 31.1 G/DL (ref 32–36)
MCV RBC AUTO: 99 FL (ref 82–98)
PLATELET # BLD AUTO: 196 K/UL (ref 150–450)
PMV BLD AUTO: 11.4 FL (ref 9.2–12.9)
POTASSIUM SERPL-SCNC: 4.3 MMOL/L (ref 3.5–5.1)
RBC # BLD AUTO: 4.31 M/UL (ref 4.6–6.2)
SATURATED IRON: 30 % (ref 20–50)
SODIUM SERPL-SCNC: 140 MMOL/L (ref 136–145)
TOTAL IRON BINDING CAPACITY: 345 UG/DL (ref 250–450)
TRANSFERRIN SERPL-MCNC: 233 MG/DL (ref 200–375)
WBC # BLD AUTO: 7.3 K/UL (ref 3.9–12.7)

## 2024-05-14 PROCEDURE — 85027 COMPLETE CBC AUTOMATED: CPT | Performed by: STUDENT IN AN ORGANIZED HEALTH CARE EDUCATION/TRAINING PROGRAM

## 2024-05-14 PROCEDURE — 83540 ASSAY OF IRON: CPT | Performed by: STUDENT IN AN ORGANIZED HEALTH CARE EDUCATION/TRAINING PROGRAM

## 2024-05-14 PROCEDURE — 36415 COLL VENOUS BLD VENIPUNCTURE: CPT | Mod: PO | Performed by: STUDENT IN AN ORGANIZED HEALTH CARE EDUCATION/TRAINING PROGRAM

## 2024-05-14 PROCEDURE — 82728 ASSAY OF FERRITIN: CPT | Performed by: STUDENT IN AN ORGANIZED HEALTH CARE EDUCATION/TRAINING PROGRAM

## 2024-05-14 PROCEDURE — 80048 BASIC METABOLIC PNL TOTAL CA: CPT | Performed by: STUDENT IN AN ORGANIZED HEALTH CARE EDUCATION/TRAINING PROGRAM

## 2024-05-22 ENCOUNTER — TELEPHONE (OUTPATIENT)
Dept: FAMILY MEDICINE | Facility: CLINIC | Age: 74
End: 2024-05-22
Payer: MEDICARE

## 2024-05-22 NOTE — TELEPHONE ENCOUNTER
----- Message from Elliot Long sent at 5/22/2024  3:36 PM CDT -----  Regarding: advice  Contact: patient  Type: Needs Medical Advice  Who Called:  Patient   Symptoms (please be specific):    How long has patient had these symptoms:    Pharmacy name and phone #:    Best Call Back Number: [phone]  Additional Information: Pt would like to get a request for handicap tag. Please call to advise. Thanks

## 2024-06-12 ENCOUNTER — CLINICAL SUPPORT (OUTPATIENT)
Dept: AUDIOLOGY | Facility: CLINIC | Age: 74
End: 2024-06-12
Payer: MEDICARE

## 2024-06-12 DIAGNOSIS — H90.3 SENSORINEURAL HEARING LOSS (SNHL) OF BOTH EARS: Primary | ICD-10-CM

## 2024-06-12 DIAGNOSIS — Z97.4 WEARS HEARING AID IN BOTH EARS: Primary | ICD-10-CM

## 2024-06-12 DIAGNOSIS — H93.13 TINNITUS OF BOTH EARS: ICD-10-CM

## 2024-06-12 PROCEDURE — 99211 OFF/OP EST MAY X REQ PHY/QHP: CPT | Mod: PBBFAC,PO,25 | Performed by: AUDIOLOGIST

## 2024-06-12 PROCEDURE — 92557 COMPREHENSIVE HEARING TEST: CPT | Mod: PBBFAC,PO | Performed by: AUDIOLOGIST

## 2024-06-12 PROCEDURE — 99999 PR PBB SHADOW E&M-EST. PATIENT-LVL I: CPT | Mod: PBBFAC,,, | Performed by: AUDIOLOGIST

## 2024-06-12 PROCEDURE — 92567 TYMPANOMETRY: CPT | Mod: PBBFAC,PO | Performed by: AUDIOLOGIST

## 2024-06-12 NOTE — PROGRESS NOTES
Dayton Faye was seen on 06/12/2024  for a hearing aid transfer of care visit. He purchased the aids in [place], [place]. Pt was alone during today's visit.     HA model and style: Phonak Audeo P90-R in color P5 ITEMIZED  Right S/N: [id number]  Left S/N: [id number]   size: 2M AU CERUSTOP  Dome size: med open    Repair warranty and L&D coverage expires: 10/30/25    Set target gain to 100%, activated SILN with instructions on use and demonstrated low battery signal for pt. Paired the patient's hearing aids with their cell phone and performed a test call to demonstrate function. Had pt play music through the phone to demonstrate streaming capabilities. Had pt download the hearing aid  malachi, paired the aids within the malachi and demonstrated function of the malachi. Pt will call the clinic PRN for any follow ups. All complaints were addressed during this visit to the patient's satisfaction. Plan of care was discussed in detail with the patient, who agreed with the plan as above.

## 2024-06-12 NOTE — PROGRESS NOTES
The patient was seen for a hearing evaluation.    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 Test results indicated a mild to moderately severe sensorineural hearing loss bilaterally. Impedance testing showed a Type A tympanogram in each ear, consistent with normal middle ear function.    The recommendations were as follows:    (1)  Continued use of binaural amplification  (2)  Hearing aid check and possible recalculation of hearing aid programming, Patient is seeing Colleen Sinclair in the hearing aid clinic today.  (3)  Ear protection in loud noise   (4)  Hearing evaluation in one year or sooner if hearing decrease is noted       Today's test results and recommendations were discussed with the patient.

## 2024-07-11 ENCOUNTER — HOSPITAL ENCOUNTER (OUTPATIENT)
Dept: RADIOLOGY | Facility: HOSPITAL | Age: 74
Discharge: HOME | End: 2024-07-11
Attending: STUDENT IN AN ORGANIZED HEALTH CARE EDUCATION/TRAINING PROGRAM
Payer: MEDICARE

## 2024-07-11 ENCOUNTER — OFFICE VISIT (OUTPATIENT)
Dept: FAMILY MEDICINE | Facility: CLINIC | Age: 74
End: 2024-07-11
Payer: MEDICARE

## 2024-07-11 VITALS
HEART RATE: 59 BPM | HEIGHT: 71 IN | DIASTOLIC BLOOD PRESSURE: 84 MMHG | SYSTOLIC BLOOD PRESSURE: 130 MMHG | TEMPERATURE: 98 F | OXYGEN SATURATION: 96 % | WEIGHT: 218.25 LBS | BODY MASS INDEX: 30.56 KG/M2
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
## 2024-07-11 DIAGNOSIS — M25.432 WRIST SWELLING, LEFT: Primary | ICD-10-CM

## 2024-07-11 PROCEDURE — 73110 X-RAY EXAM OF WRIST: CPT | Mod: TC,FY,PO,LT

## 2024-07-11 PROCEDURE — 71101 X-RAY EXAM UNILAT RIBS/CHEST: CPT | Mod: TC,FY,PO,LT

## 2024-07-11 PROCEDURE — 73502 X-RAY EXAM HIP UNI 2-3 VIEWS: CPT | Mod: TC,FY,PO,LT

## 2024-07-11 PROCEDURE — 99215 OFFICE O/P EST HI 40 MIN: CPT | Mod: PBBFAC,25,PO | Performed by: STUDENT IN AN ORGANIZED HEALTH CARE EDUCATION/TRAINING PROGRAM

## 2024-07-11 PROCEDURE — 99999 PR PBB SHADOW E&M-EST. PATIENT-LVL V: CPT | Mod: PBBFAC,,, | Performed by: STUDENT IN AN ORGANIZED HEALTH CARE EDUCATION/TRAINING PROGRAM

## 2024-07-11 RX ORDER — CEPHALEXIN 500 MG/1
TABLET ORAL
COMMUNITY
Start: 2023-07-28 | End: 2024-07-18

## 2024-07-11 NOTE — PATIENT INSTRUCTIONS
Follow up with Dr. Watson as previously scheduled on 7/18/24 - if imaging is negative today, since it is still soon after your fall, there is a chance that it could miss a fracture until waiting at least 2 weeks after injury.

## 2024-07-11 NOTE — PROGRESS NOTES
Patient had a fall with significant wrist swelling. We did xrays today. He is scheduled to see you 7/18/24.

## 2024-07-11 NOTE — PROGRESS NOTES
Subjective:       Patient ID: Dayton Faye is a 73 y.o. male.    Chief Complaint: Fall (7/9/24 - Took a step that wasn't there), Wrist Injury (Lt Wrist), Joint Swelling (Lt Wrist), Knee Injury (Lt Knee), Rib Injury (Lt Side), Bleeding/Bruising (Lt Thigh), Abrasion (Lt Knee & Lt Elbow), and Thumb (Bruising/Swelling)    This is a 73 year old male new to me who follows with Dr. Watson who presents with a fall on Tuesday, 48 hours prior. He has taken extra strength tylenol and used ice on his hand which helped with the swelling. His wife suggested he get checked out. He is scheduled to see Dr. Watson on the 18th. He sees the dermatologist twice a year. He spends little time outside. He does not eat much dairy - sometimes ice cream; he does eat slices of cheese on a ham sandwich. He used to take calcium pills but that has been years ago. He has had hip replacement in the past. 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 He takes Gingko for tinnitus but still has tinnitus. He is not on aspirin or blood thinners.    He did not pass out with the fall. There is swelling and purple discoloration of the left hand and wrist with limited range of motion due to pain and swelling. He also has pain along his left rib cage and an abrasion on his left knee. The knee does not appear swollen and is nontender on palpation and without pain or crepitus on passive range of motion. On exam there is mild tenderness on moderate palpation of the lateral aspect of the hip.    Fall    Wrist Injury     Knee Injury        Past Medical History:   Diagnosis Date    Arthritis     General anesthetics causing adverse effect in therapeutic use     takes longer to wake up after anesthesia    PONV (postoperative nausea and vomiting)     Pre-diabetes     Retinal detachment     Skin cancer     face       Past Surgical  "History:   Procedure Laterality Date    BACK SURGERY      CATARACT EXTRACTION Bilateral     COLONOSCOPY N/A 2024    Procedure: COLONOSCOPY;  Surgeon: MEJIA Mccabe MD;  [facility];  Service: Endoscopy;  Laterality: N/A;    EYE SURGERY      HIP SURGERY Right     JOINT REPLACEMENT  2016    Right hip joint replaced    KNEE SURGERY Left     RETINAL DETACHMENT SURGERY Left     ROBOTIC ARTHROPLASTY, KNEE Right 2023    Procedure: ROBOTIC ARTHROPLASTY, KNEE, TOTAL;  Surgeon: Fili Scott MD;  Location: [facility];  Service: Orthopedics;  Laterality: Right;    SHOULDER SURGERY Right     SHOULDER SURGERY Left     SPINE SURGERY      lumbar fusion       Review of patient's allergies indicates:   Allergen Reactions    Latex     Adhesive Rash     Patients states "surgical tape."    Latex, natural rubber Rash       Social History     Socioeconomic History    Marital status:    Occupational History    Occupation: School psychologist     Comment: retired   Tobacco Use    Smoking status: Former     Current packs/day: 0.00     Average packs/day: 0.5 packs/day for 5.0 years (2.5 ttl pk-yrs)     Types: Cigarettes, Pipe     Start date: 1986     Quit date: 1991     Years since quittin.5    Smokeless tobacco: Never   Substance and Sexual Activity    Alcohol use: Yes     Comment: Very occasionally    Drug use: Never    Sexual activity: Yes     Partners: Female     Birth control/protection: Post-menopausal   Social History Narrative    Lives with female partner.  Moved here from [place]. Two cats. Enjoys ancestry, reading.     Social Determinants of Health     Financial Resource Strain: Low Risk  (2024)    Overall Financial Resource Strain (CARDIA)     Difficulty of Paying Living Expenses: Not hard at all   Food Insecurity: No Food Insecurity (2024)    Hunger Vital Sign     Worried About Running Out of Food in the Last Year: Never true     Ran Out of Food in the Last Year: Never " true   Physical Activity: Inactive (2024)    Exercise Vital Sign     Days of Exercise per Week: 0 days     Minutes of Exercise per Session: 10 min   Stress: No Stress Concern Present (2024)    [employer] - Occupational Stress Questionnaire     Feeling of Stress : Not at all   Housing Stability: Unknown (2024)    Housing Stability Vital Sign     Unable to Pay for Housing in the Last Year: No       Current Outpatient Medications on File Prior to Visit   Medication Sig Dispense Refill    atorvastatin (LIPITOR) 20 MG tablet TAKE 1 TABLET(20 MG) BY MOUTH EVERY DAY 90 tablet 3    CLARITIN-D 24 HOUR  mg per 24 hr tablet Take 1 tablet by mouth.      coenzyme Q10 100 mg capsule Take 200 mg by mouth.      ginkgo biloba 120 mg Tab Take by mouth once daily.      krill-om-3-dha-epa-phospho-ast 924-291-95-64 mg Cap Take by mouth once daily.      losartan (COZAAR) 100 MG tablet TAKE 1 TABLET(100 MG) BY MOUTH EVERY DAY 90 tablet 2    mv-min-folic acid-lutein 500-250 mcg Chew Take by mouth.      pantoprazole (PROTONIX) 40 MG tablet TAKE 1 TABLET(40 MG) BY MOUTH EVERY DAY 90 tablet 3    prednisoLONE acetate (PRED MILD) 0.12 % ophthalmic suspension Place 1 drop into the left eye every morning.      sodium bicarbonate 650 MG tablet Take 1 tablet (650 mg total) by mouth once daily. 30 tablet 11    tiZANidine (ZANAFLEX) 4 MG tablet TAKE 1 TABLET(4 MG) BY MOUTH EVERY 8 HOURS 30 tablet 5    vit C/E/Zn/coppr/lutein/zeaxan (PRESERVISION AREDS-2 ORAL) Take by mouth 2 (two) times a day.       No current facility-administered medications on file prior to visit.       Family History   Problem Relation Name Age of Onset    Macular degeneration Father Haris Faye     Heart disease Father Haris Faye     Arthritis Father Haris Faye          at age 92    Hypertension Father Haris Faye     Vision loss Father Haris Faye     No Known Problems Sister 1/2     No Known Problems  "Brother      Cancer Paternal Grandmother Kathy Faye         Lung    Cancer Paternal Grandfather Hunter Faye         Prostate    Glaucoma Neg Hx         Review of Systems    Objective:      /84   Pulse (!) 59   Temp 97.9 °F (36.6 °C) (Oral)   Ht 5' 11" (1.803 m)   Wt 99 kg (218 lb 4.1 oz)   SpO2 96%   BMI 30.44 kg/m²   Physical Exam  as per HPI  Assessment:       1. Fall, initial encounter        Plan:       Fall, initial encounter  -     X-Ray Wrist Complete Left; Future; Expected date: 07/11/2024  -     XR Ribs Min 3 Views w/PA Chest Left; Future; Expected date: 07/11/2024  -     X-Ray Hip 2 or 3 views Left with Pelvis when performed; Future; Expected date: 07/11/2024  - Called to discuss xray findings with the patient - no acute fracture seen but there is possible loose body in the hip joint. Let him know that sometimes after a fall on a wrist, xrays performed soon after injury are not able to detect fracture. He should continue to follow up with his PCP, Dr. Watson, consider calcium and vitamin D, and consider follow up with orthopedics about possible loose body.        "

## 2024-07-15 ENCOUNTER — TELEPHONE (OUTPATIENT)
Dept: FAMILY MEDICINE | Facility: CLINIC | Age: 74
End: 2024-07-15
Payer: MEDICARE

## 2024-07-15 PROBLEM — M25.9 HIP PROBLEM: Status: ACTIVE | Noted: 2024-07-15

## 2024-07-15 NOTE — TELEPHONE ENCOUNTER
Patient confirmed date of birth. He still has pain in his ribs and wrist, though this continues to improve. The pain in the ribs is with deep breaths. The wrist swelling is improving and so is the range of motion. The patient anticipates follow up with Dr. Watson this coming Thursday. He will talk with him about wrist injuries and the potential for occult fracture when imaging is performed soon after the injury. We also discuss hip xray that shows possible loose body in the left hip of unknown etiology. He had a right rip replacement in [place], [place] many years ago with an orthopedist. He will discuss follow up investigation of this with Dr. Watson as well. Grateful for the call. No further questions.

## 2024-07-18 ENCOUNTER — OFFICE VISIT (OUTPATIENT)
Dept: FAMILY MEDICINE | Facility: CLINIC | Age: 74
End: 2024-07-18
Payer: MEDICARE

## 2024-07-18 VITALS
DIASTOLIC BLOOD PRESSURE: 74 MMHG | WEIGHT: 218.81 LBS | HEIGHT: 71 IN | OXYGEN SATURATION: 97 % | SYSTOLIC BLOOD PRESSURE: 128 MMHG | BODY MASS INDEX: 30.63 KG/M2 | HEART RATE: 68 BPM

## 2024-07-18 DIAGNOSIS — N18.32 STAGE 3B CHRONIC KIDNEY DISEASE: ICD-10-CM

## 2024-07-18 DIAGNOSIS — I10 ESSENTIAL HYPERTENSION: Primary | ICD-10-CM
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
## 2024-07-18 PROCEDURE — 99214 OFFICE O/P EST MOD 30 MIN: CPT | Mod: S$PBB,,, | Performed by: STUDENT IN AN ORGANIZED HEALTH CARE EDUCATION/TRAINING PROGRAM

## 2024-07-18 PROCEDURE — 99214 OFFICE O/P EST MOD 30 MIN: CPT | Mod: PBBFAC,PO | Performed by: STUDENT IN AN ORGANIZED HEALTH CARE EDUCATION/TRAINING PROGRAM

## 2024-07-18 PROCEDURE — 99999 PR PBB SHADOW E&M-EST. PATIENT-LVL IV: CPT | Mod: PBBFAC,,, | Performed by: STUDENT IN AN ORGANIZED HEALTH CARE EDUCATION/TRAINING PROGRAM

## 2024-07-18 NOTE — PROGRESS NOTES
Name: Dayton Faye  MRN: [id number]  : 1950    Subjective   HPI  Patient presents for regular visit.     Review of Systems   Cardiovascular:  Negative for palpitations and leg swelling.   Musculoskeletal:  Positive for arthralgias (left wrist).   Neurological:  Negative for dizziness.        Patient Active Problem List   Diagnosis    Essential hypertension    Mixed hyperlipidemia    Gastroesophageal reflux disease    Plantar fasciitis of left foot    Chronic pain of right knee    Muscle spasm    Chronic sinusitis    Lower extremity edema    Dyspnea on exertion    Stage 3b chronic kidney disease    Chronic left-sided low back pain without sciatica    Diverticulosis    Hip problem, left    Fall       Current Outpatient Medications on File Prior to Visit   Medication Sig Dispense Refill    atorvastatin (LIPITOR) 20 MG tablet TAKE 1 TABLET(20 MG) BY MOUTH EVERY DAY 90 tablet 3    CLARITIN-D 24 HOUR  mg per 24 hr tablet Take 1 tablet by mouth.      coenzyme Q10 100 mg capsule Take 200 mg by mouth.      ginkgo biloba 120 mg Tab Take by mouth once daily.      krill-om-3-dha-epa-phospho-ast 403-117-86-64 mg Cap Take by mouth once daily.      losartan (COZAAR) 100 MG tablet TAKE 1 TABLET(100 MG) BY MOUTH EVERY DAY 90 tablet 2    mv-min-folic acid-lutein 500-250 mcg Chew Take by mouth.      pantoprazole (PROTONIX) 40 MG tablet TAKE 1 TABLET(40 MG) BY MOUTH EVERY DAY 90 tablet 3    prednisoLONE acetate (PRED MILD) 0.12 % ophthalmic suspension Place 1 drop into the left eye every morning.      sodium bicarbonate 650 MG tablet Take 1 tablet (650 mg total) by mouth once daily. 30 tablet 11    tiZANidine (ZANAFLEX) 4 MG tablet TAKE 1 TABLET(4 MG) BY MOUTH EVERY 8 HOURS 30 tablet 5    vit C/E/Zn/coppr/lutein/zeaxan (PRESERVISION AREDS-2 ORAL) Take by mouth 2 (two) times a day.      [DISCONTINUED] cephalexin (KEFTAB) 500 mg tablet TAKE 2 TABLETS BY MOUTH 1 HOUR PRIOR TO DENTAL CLEANING OR PROCEDURE (Patient not taking:  Reported on 2024)       No current facility-administered medications on file prior to visit.       Past Medical History:   Diagnosis Date    Arthritis     General anesthetics causing adverse effect in therapeutic use     takes longer to wake up after anesthesia    PONV (postoperative nausea and vomiting)     Pre-diabetes     Retinal detachment     Skin cancer     face       Past Surgical History:   Procedure Laterality Date    BACK SURGERY      CATARACT EXTRACTION Bilateral     COLONOSCOPY N/A 2024    Procedure: COLONOSCOPY;  Surgeon: MEJIA Mccabe MD;  Location[facility];  Service: Endoscopy;  Laterality: N/A;    EYE SURGERY      HIP SURGERY Right     JOINT REPLACEMENT  2016    Right hip joint replaced    KNEE SURGERY Left     RETINAL DETACHMENT SURGERY Left     ROBOTIC ARTHROPLASTY, KNEE Right 2023    Procedure: ROBOTIC ARTHROPLASTY, KNEE, TOTAL;  Surgeon: Fili Scott MD;  Location: [facility];  Service: Orthopedics;  Laterality: Right;    SHOULDER SURGERY Right     SHOULDER SURGERY Left     SPINE SURGERY      lumbar fusion        Family History   Problem Relation Name Age of Onset    Macular degeneration Father Haris Faye     Heart disease Father Haris Faye     Arthritis Father Haris Faye          at age 92    Hypertension Father Haris Faye     Vision loss Father Haris Faye     No Known Problems Sister 2     No Known Problems Brother      Cancer Paternal Grandmother Kathy Faye         Lung    Cancer Paternal Grandfather Hunter Faye         Prostate    Glaucoma Neg Hx         Social History     Socioeconomic History    Marital status:    Occupational History    Occupation: School psychologist     Comment: retired   Tobacco Use    Smoking status: Former     Current packs/day: 0.00     Average packs/day: 0.5 packs/day for 5.0 years (2.5 ttl pk-yrs)     Types: Cigarettes, Pipe     Start date: 1986     Quit date: 1991     Years since  "quittin.5    Smokeless tobacco: Never   Substance and Sexual Activity    Alcohol use: Yes     Comment: Very occasionally    Drug use: Never    Sexual activity: Yes     Partners: Female     Birth control/protection: Post-menopausal   Social History Narrative    Lives with female partner.  Moved here from [place]. Two cats. Enjoys ancestry, reading.     Social Determinants of Health     Financial Resource Strain: Low Risk  (2024)    Overall Financial Resource Strain (CARDIA)     Difficulty of Paying Living Expenses: Not hard at all   Food Insecurity: No Food Insecurity (2024)    Hunger Vital Sign     Worried About Running Out of Food in the Last Year: Never true     Ran Out of Food in the Last Year: Never true   Physical Activity: Inactive (2024)    Exercise Vital Sign     Days of Exercise per Week: 0 days     Minutes of Exercise per Session: 10 min   Stress: No Stress Concern Present (2024)    [employer] - Occupational Stress Questionnaire     Feeling of Stress : Not at all   Housing Stability: Unknown (2024)    Housing Stability Vital Sign     Unable to Pay for Housing in the Last Year: No       Review of patient's allergies indicates:   Allergen Reactions    Latex     Adhesive Rash     Patients states "surgical tape."    Latex, natural rubber Rash        Health Maintenance Due   Topic Date Due    TETANUS VACCINE  Never done    Shingles Vaccine (1 of 2) Never done    RSV Vaccine (Age 60+ and Pregnant patients) (1 - 1-dose 60+ series) Never done    COVID-19 Vaccine ( season) Never done       Objective   Vitals:    24 1014   BP: 128/74   Pulse:         Physical Exam  Constitutional:       General: He is not in acute distress.     Appearance: Normal appearance. He is well-developed.   HENT:      Head: Normocephalic and atraumatic.      Right Ear: External ear normal.      Left Ear: External ear normal.   Eyes:      Conjunctiva/sclera: " Conjunctivae normal.   Pulmonary:      Effort: Pulmonary effort is normal. No respiratory distress.   Abdominal:      General: Abdomen is flat. There is no distension.   Musculoskeletal:         General: No swelling or deformity.      Left wrist: Tenderness (ulnar region) present.      Left hand: Swelling and tenderness (medial, near wrist) present.      Right lower leg: No edema.      Left lower leg: No edema.   Skin:     General: Skin is warm and dry.      Coloration: Skin is not jaundiced.   Neurological:      Mental Status: He is alert and oriented to person, place, and time. Mental status is at baseline.   Psychiatric:         Attention and Perception: Attention and perception normal.         Mood and Affect: Mood normal.         Speech: Speech normal.         Behavior: Behavior normal. Behavior is cooperative.         Thought Content: Thought content normal.         Cognition and Memory: Cognition normal.         Judgment: Judgment normal.          Assessment & Plan   1. Essential hypertension  Assessment & Plan:  Controlled. Continue losartan.      2. Fall, subsequent encounter  Assessment & Plan:  Patient is recovering well. He saw Dr. Rodriguez a week ago for the fall. His rib pain is better and can breathe normally. He is still having left wrist pain but bruising is resolving and pain is managed with Tylenol and icing. I suggested adding diclofenac (Voltaren) gel if needed. We reviewed left hip xray which reported possible foreign body/loose body. I cannot see this body in the xray and the patient is currently having no issues with his left hip. Continue to monitor.      3. Stage 3b chronic kidney disease  Assessment & Plan:  Labs from 5/2024 show stable kidney disease and improvement in metabolic acidosis with sodium bicarbonate. Continue medication.          Follow up in 6 months.     Thierno Watson MD  07/18/2024

## 2024-07-18 NOTE — ASSESSMENT & PLAN NOTE
Attending Attestation (For Attendings USE Only)... Labs from 5/2024 show stable kidney disease and improvement in metabolic acidosis with sodium bicarbonate. Continue medication.

## 2024-07-18 NOTE — ASSESSMENT & PLAN NOTE
Patient is recovering well. He saw Dr. Rodriguez a week ago for the fall. His rib pain is better and can breathe normally. He is still having left wrist pain but bruising is resolving and pain is managed with Tylenol and icing. I suggested adding diclofenac (Voltaren) gel if needed. We reviewed left hip xray which reported possible foreign body/loose body. I cannot see this body in the xray and the patient is currently having no issues with his left hip. Continue to monitor.

## 2024-08-27 ENCOUNTER — PATIENT MESSAGE (OUTPATIENT)
Dept: FAMILY MEDICINE | Facility: CLINIC | Age: 74
End: 2024-08-27
Payer: MEDICARE

## 2024-10-08 DIAGNOSIS — M62.838 MUSCLE SPASM: ICD-10-CM

## 2024-10-08 NOTE — TELEPHONE ENCOUNTER
Care Due:                  Date            Visit Type   Department     Provider  --------------------------------------------------------------------------------                                EP -                              [facility] FAMILY  Last Visit: 07-      CARE ([facility])   MEDICINE       Thierno Watson                              EP -                              PRIMARY      [facility] FAMILY  Next Visit: 01-      CARE ([facility])   MEDICINE       Thierno Watson                                                            Last  Test          Frequency    Reason                     Performed    Due Date  --------------------------------------------------------------------------------    Lipid Panel.  12 months..  atorvastatin.............  01- 01-    [facility] Embedded Care Due Messages. Reference number: [id number].   10/08/2024 4:20:55 PM CDT

## 2024-10-09 RX ORDER — TIZANIDINE 4 MG/1
4 TABLET ORAL EVERY 8 HOURS
Qty: 30 TABLET | Refills: 5 | Status: SHIPPED | OUTPATIENT
Start: 2024-10-09

## 2024-12-05 DIAGNOSIS — E78.2 MIXED HYPERLIPIDEMIA: ICD-10-CM

## 2024-12-05 RX ORDER — ATORVASTATIN CALCIUM 20 MG/1
20 TABLET, FILM COATED ORAL
Qty: 90 TABLET | Refills: 2 | Status: SHIPPED | OUTPATIENT
Start: 2024-12-05

## 2024-12-05 NOTE — TELEPHONE ENCOUNTER
Care Due:                  Date            Visit Type   Department     Provider  --------------------------------------------------------------------------------                                EP -                              PRIMARY      [facility] FAMILY  Last Visit: 07-      CARE ([facility])   LEXII Watson                               -                              [facility]  Next Visit: 01-      CARE ([facility])   LEXII Watson                                                            Last  Test          Frequency    Reason                     Performed    Due Date  --------------------------------------------------------------------------------    CMP.........  12 months..  atorvastatin.............  01- 01-    [facility] Embedded Care Due Messages. Reference number: [id number].   12/05/2024 8:09:49 AM CST

## 2024-12-05 NOTE — TELEPHONE ENCOUNTER
Refill Routing Note   Medication(s) are not appropriate for processing by [facility] for the following reason(s):        Required labs outdated    [facility] action(s):  Defer     Requires labs : Yes             Appointments  past 12m or future 3m with PCP    Date Provider   Last Visit   7/18/2024 Thierno Watson MD   Next Visit   1/28/2025 Thierno Watson MD   ED visits in past 90 days: 0        Note composed:1:43 PM 12/05/2024

## 2025-01-15 DIAGNOSIS — N18.32 STAGE 3B CHRONIC KIDNEY DISEASE: ICD-10-CM

## 2025-01-15 RX ORDER — SODIUM BICARBONATE 650 MG/1
TABLET ORAL
Qty: 30 TABLET | Refills: 5 | Status: SHIPPED | OUTPATIENT
Start: 2025-01-15 | End: 2025-01-28 | Stop reason: SDUPTHER

## 2025-01-16 DIAGNOSIS — K21.9 GASTROESOPHAGEAL REFLUX DISEASE, UNSPECIFIED WHETHER ESOPHAGITIS PRESENT: ICD-10-CM

## 2025-01-16 RX ORDER — PANTOPRAZOLE SODIUM 40 MG/1
40 TABLET, DELAYED RELEASE ORAL
Qty: 90 TABLET | Refills: 1 | Status: SHIPPED | OUTPATIENT
Start: 2025-01-16 | End: 2025-01-28 | Stop reason: SDUPTHER

## 2025-01-16 NOTE — TELEPHONE ENCOUNTER
Provider Staff:  Action required for this patient    Requires labs      Please see care gap opportunities below in Care Due Message.    Thanks!  [facility]     Appointments      Date Provider   Last Visit   7/18/2024 Thierno Watson MD   Next Visit   1/28/2025 Thierno Watson MD     Refill Decision Note   Dayton Faye  is requesting a refill authorization.  Brief Assessment and Rationale for Refill:  Approve     Medication Therapy Plan:        Comments:     Note composed:11:33 AM 01/16/2025

## 2025-01-16 NOTE — TELEPHONE ENCOUNTER
Care Due:                  Date            Visit Type   Department     Provider  --------------------------------------------------------------------------------                                EP -                              PRIMARY      [facility] FAMILY  Last Visit: 07-      CARE ([facility])   MEDICINE       Thierno Watson                               -                              [facility]  Next Visit: 01-      CARE ([facility])   MEDICINE       Thierno Watson                                                            Last  Test          Frequency    Reason                     Performed    Due Date  --------------------------------------------------------------------------------    Lipid Panel.  12 months..  atorvastatin.............  01- 01-    [facility] Embedded Care Due Messages. Reference number: [id number].   1/16/2025 8:09:29 AM CST

## 2025-01-28 ENCOUNTER — LAB VISIT (OUTPATIENT)
Dept: LAB | Facility: HOSPITAL | Age: 75
End: 2025-01-28
Attending: STUDENT IN AN ORGANIZED HEALTH CARE EDUCATION/TRAINING PROGRAM
Payer: MEDICARE

## 2025-01-28 ENCOUNTER — OFFICE VISIT (OUTPATIENT)
Dept: FAMILY MEDICINE | Facility: CLINIC | Age: 75
End: 2025-01-28
Payer: MEDICARE

## 2025-01-28 VITALS
HEIGHT: 71 IN | SYSTOLIC BLOOD PRESSURE: 130 MMHG | DIASTOLIC BLOOD PRESSURE: 70 MMHG | OXYGEN SATURATION: 99 % | BODY MASS INDEX: 31.45 KG/M2 | WEIGHT: 224.63 LBS | HEART RATE: 90 BPM

## 2025-01-28 DIAGNOSIS — I10 ESSENTIAL HYPERTENSION: ICD-10-CM

## 2025-01-28 DIAGNOSIS — E78.2 MIXED HYPERLIPIDEMIA: ICD-10-CM

## 2025-01-28 DIAGNOSIS — M62.838 MUSCLE SPASM: ICD-10-CM

## 2025-01-28 DIAGNOSIS — N18.32 STAGE 3B CHRONIC KIDNEY DISEASE: ICD-10-CM

## 2025-01-28 DIAGNOSIS — K21.9 GASTROESOPHAGEAL REFLUX DISEASE, UNSPECIFIED WHETHER ESOPHAGITIS PRESENT: ICD-10-CM

## 2025-01-28 DIAGNOSIS — J32.9 CHRONIC SINUSITIS, UNSPECIFIED LOCATION: ICD-10-CM

## 2025-01-28 DIAGNOSIS — N18.32 STAGE 3B CHRONIC KIDNEY DISEASE: Primary | ICD-10-CM

## 2025-01-28 PROBLEM — R06.09 DYSPNEA ON EXERTION: Status: RESOLVED | Noted: 2023-01-27 | Resolved: 2025-01-28

## 2025-01-28 LAB
ALBUMIN SERPL BCP-MCNC: 3.8 G/DL (ref 3.5–5.2)
ALP SERPL-CCNC: 79 U/L (ref 40–150)
ALT SERPL W/O P-5'-P-CCNC: 23 U/L (ref 10–44)
ANION GAP SERPL CALC-SCNC: 9 MMOL/L (ref 8–16)
AST SERPL-CCNC: 21 U/L (ref 10–40)
BILIRUB SERPL-MCNC: 0.4 MG/DL (ref 0.1–1)
BUN SERPL-MCNC: 26 MG/DL (ref 8–23)
CALCIUM SERPL-MCNC: 8.9 MG/DL (ref 8.7–10.5)
CHLORIDE SERPL-SCNC: 110 MMOL/L (ref 95–110)
CHOLEST SERPL-MCNC: 157 MG/DL (ref 120–199)
CHOLEST/HDLC SERPL: 3.9 {RATIO} (ref 2–5)
CO2 SERPL-SCNC: 22 MMOL/L (ref 23–29)
CREAT SERPL-MCNC: 1.8 MG/DL (ref 0.5–1.4)
ERYTHROCYTE [DISTWIDTH] IN BLOOD BY AUTOMATED COUNT: 13.6 % (ref 11.5–14.5)
EST. GFR  (NO RACE VARIABLE): 39 ML/MIN/1.73 M^2
GLUCOSE SERPL-MCNC: 93 MG/DL (ref 70–110)
HCT VFR BLD AUTO: 41 % (ref 40–54)
HDLC SERPL-MCNC: 40 MG/DL (ref 40–75)
HDLC SERPL: 25.5 % (ref 20–50)
HGB BLD-MCNC: 13.5 G/DL (ref 14–18)
LDLC SERPL CALC-MCNC: 89 MG/DL (ref 63–159)
MCH RBC QN AUTO: 31.6 PG (ref 27–31)
MCHC RBC AUTO-ENTMCNC: 32.9 G/DL (ref 32–36)
MCV RBC AUTO: 96 FL (ref 82–98)
NONHDLC SERPL-MCNC: 117 MG/DL
PHOSPHATE SERPL-MCNC: 2.3 MG/DL (ref 2.7–4.5)
PLATELET # BLD AUTO: 183 K/UL (ref 150–450)
PMV BLD AUTO: 10.3 FL (ref 9.2–12.9)
POTASSIUM SERPL-SCNC: 5 MMOL/L (ref 3.5–5.1)
PROT SERPL-MCNC: 7.3 G/DL (ref 6–8.4)
PTH-INTACT SERPL-MCNC: 97 PG/ML (ref 9–77)
RBC # BLD AUTO: 4.27 M/UL (ref 4.6–6.2)
SODIUM SERPL-SCNC: 141 MMOL/L (ref 136–145)
TRIGL SERPL-MCNC: 140 MG/DL (ref 30–150)
WBC # BLD AUTO: 6.82 K/UL (ref 3.9–12.7)

## 2025-01-28 PROCEDURE — G2211 COMPLEX E/M VISIT ADD ON: HCPCS | Mod: S$PBB,,, | Performed by: STUDENT IN AN ORGANIZED HEALTH CARE EDUCATION/TRAINING PROGRAM

## 2025-01-28 PROCEDURE — 99999 PR PBB SHADOW E&M-EST. PATIENT-LVL III: CPT | Mod: PBBFAC,,, | Performed by: STUDENT IN AN ORGANIZED HEALTH CARE EDUCATION/TRAINING PROGRAM

## 2025-01-28 PROCEDURE — 85027 COMPLETE CBC AUTOMATED: CPT | Mod: PO | Performed by: STUDENT IN AN ORGANIZED HEALTH CARE EDUCATION/TRAINING PROGRAM

## 2025-01-28 PROCEDURE — 80061 LIPID PANEL: CPT | Performed by: STUDENT IN AN ORGANIZED HEALTH CARE EDUCATION/TRAINING PROGRAM

## 2025-01-28 PROCEDURE — 83970 ASSAY OF PARATHORMONE: CPT | Performed by: STUDENT IN AN ORGANIZED HEALTH CARE EDUCATION/TRAINING PROGRAM

## 2025-01-28 PROCEDURE — 82652 VIT D 1 25-DIHYDROXY: CPT | Performed by: STUDENT IN AN ORGANIZED HEALTH CARE EDUCATION/TRAINING PROGRAM

## 2025-01-28 PROCEDURE — 99213 OFFICE O/P EST LOW 20 MIN: CPT | Mod: PBBFAC,PO | Performed by: STUDENT IN AN ORGANIZED HEALTH CARE EDUCATION/TRAINING PROGRAM

## 2025-01-28 PROCEDURE — 99214 OFFICE O/P EST MOD 30 MIN: CPT | Mod: S$PBB,,, | Performed by: STUDENT IN AN ORGANIZED HEALTH CARE EDUCATION/TRAINING PROGRAM

## 2025-01-28 PROCEDURE — 84100 ASSAY OF PHOSPHORUS: CPT | Mod: PO | Performed by: STUDENT IN AN ORGANIZED HEALTH CARE EDUCATION/TRAINING PROGRAM

## 2025-01-28 PROCEDURE — 80053 COMPREHEN METABOLIC PANEL: CPT | Mod: PO | Performed by: STUDENT IN AN ORGANIZED HEALTH CARE EDUCATION/TRAINING PROGRAM

## 2025-01-28 RX ORDER — TIZANIDINE 4 MG/1
4 TABLET ORAL EVERY 8 HOURS
Qty: 90 TABLET | Refills: 3 | Status: SHIPPED | OUTPATIENT
Start: 2025-01-28

## 2025-01-28 RX ORDER — PANTOPRAZOLE SODIUM 40 MG/1
40 TABLET, DELAYED RELEASE ORAL DAILY
Qty: 90 TABLET | Refills: 3 | Status: SHIPPED | OUTPATIENT
Start: 2025-01-28

## 2025-01-28 RX ORDER — ATORVASTATIN CALCIUM 20 MG/1
20 TABLET, FILM COATED ORAL DAILY
Qty: 90 TABLET | Refills: 3 | Status: SHIPPED | OUTPATIENT
Start: 2025-01-28

## 2025-01-28 RX ORDER — SODIUM BICARBONATE 650 MG/1
650 TABLET ORAL DAILY
Qty: 90 TABLET | Refills: 3 | Status: SHIPPED | OUTPATIENT
Start: 2025-01-28

## 2025-01-28 RX ORDER — LOSARTAN POTASSIUM 100 MG/1
100 TABLET ORAL DAILY
Qty: 90 TABLET | Refills: 3 | Status: SHIPPED | OUTPATIENT
Start: 2025-01-28

## 2025-01-28 NOTE — PROGRESS NOTES
Name: Dayton Faye  MRN: [id number]  : 1950    Subjective   HPI  Patient presents for regular check up. No acute concerns.     Review of Systems   HENT:  Negative for hearing loss.    Eyes:  Negative for visual disturbance.   Respiratory:  Negative for shortness of breath.    Cardiovascular:  Negative for chest pain.   Gastrointestinal:  Positive for constipation (no hard or painful stools). Negative for diarrhea, nausea and vomiting.   Neurological:  Negative for dizziness and light-headedness (one isolated bout about a month ago).   Psychiatric/Behavioral:  Negative for dysphoric mood and sleep disturbance. The patient is not nervous/anxious.         Patient Active Problem List   Diagnosis    Essential hypertension    Mixed hyperlipidemia    Gastroesophageal reflux disease    Plantar fasciitis of left foot    Chronic pain of right knee    Muscle spasm    Chronic sinusitis    Lower extremity edema    Stage 3b chronic kidney disease    Chronic left-sided low back pain without sciatica    Diverticulosis    Hip problem, left    Fall       Current Outpatient Medications on File Prior to Visit   Medication Sig Dispense Refill    CLARITIN-D 24 HOUR  mg per 24 hr tablet Take 1 tablet by mouth.      coenzyme Q10 100 mg capsule Take 200 mg by mouth.      ginkgo biloba 120 mg Tab Take by mouth once daily.      krill-om-3-dha-epa-phospho-ast 775-770-55-64 mg Cap Take by mouth once daily.      mv-min-folic acid-lutein 500-250 mcg Chew Take by mouth.      prednisoLONE acetate (PRED MILD) 0.12 % ophthalmic suspension Place 1 drop into the left eye every morning.      vit C/E/Zn/coppr/lutein/zeaxan (PRESERVISION AREDS-2 ORAL) Take by mouth 2 (two) times a day.      [DISCONTINUED] atorvastatin (LIPITOR) 20 MG tablet TAKE 1 TABLET(20 MG) BY MOUTH EVERY DAY 90 tablet 2    [DISCONTINUED] losartan (COZAAR) 100 MG tablet TAKE 1 TABLET(100 MG) BY MOUTH EVERY DAY 90 tablet 2    [DISCONTINUED] pantoprazole (PROTONIX) 40 MG tablet  TAKE 1 TABLET(40 MG) BY MOUTH EVERY DAY 90 tablet 1    [DISCONTINUED] sodium bicarbonate 650 MG tablet TAKE 1 TABLET(650 MG) BY MOUTH EVERY DAY 30 tablet 5    [DISCONTINUED] tiZANidine (ZANAFLEX) 4 MG tablet TAKE 1 TABLET(4 MG) BY MOUTH EVERY 8 HOURS 30 tablet 5     No current facility-administered medications on file prior to visit.       Past Medical History:   Diagnosis Date    Arthritis     General anesthetics causing adverse effect in therapeutic use     takes longer to wake up after anesthesia    PONV (postoperative nausea and vomiting)     Pre-diabetes     Retinal detachment     Skin cancer     face       Past Surgical History:   Procedure Laterality Date    BACK SURGERY      CATARACT EXTRACTION Bilateral     COLONOSCOPY N/A 2024    Procedure: COLONOSCOPY;  Surgeon: MEJIA Mccabe MD;  Location: [facility] ENDO;  Service: Endoscopy;  Laterality: N/A;    EYE SURGERY      HIP SURGERY Right     JOINT REPLACEMENT      Right hip joint replaced    KNEE SURGERY Left     RETINAL DETACHMENT SURGERY Left     ROBOTIC ARTHROPLASTY, KNEE Right 2023    Procedure: ROBOTIC ARTHROPLASTY, KNEE, TOTAL;  Surgeon: Fili Scott MD;  Location: [facility] OR;  Service: Orthopedics;  Laterality: Right;    SHOULDER SURGERY Right     SHOULDER SURGERY Left     SPINE SURGERY      lumbar fusion        Family History   Problem Relation Name Age of Onset    Macular degeneration Father Haris Faye     Heart disease Father Haris Faye     Arthritis Father Haris Faye          at age 92    Hypertension Father Haris Faye     Vision loss Father Haris Faye     No Known Problems Sister 1/2     No Known Problems Brother      Cancer Paternal Grandmother Kathy Faye         Lung    Cancer Paternal Grandfather Hunter Faye         Prostate    Glaucoma Neg Hx         Social History     Socioeconomic History    Marital status:    Occupational History    Occupation: School psychologist      "Comment: retired   Tobacco Use    Smoking status: Former     Current packs/day: 0.00     Average packs/day: 0.5 packs/day for 5.0 years (2.5 ttl pk-yrs)     Types: Cigarettes, Pipe     Start date: 1986     Quit date: 1991     Years since quittin.0    Smokeless tobacco: Never   Substance and Sexual Activity    Alcohol use: Not Currently     Comment: Very occasionally    Drug use: Never    Sexual activity: Yes     Partners: Female     Birth control/protection: Post-menopausal   Social History Narrative    Lives with female partner.  Moved here from [place]. Three cats. Enjoys ancestry, reading.     Social Drivers of Health     Financial Resource Strain: Low Risk  (2024)    Overall Financial Resource Strain (CARDIA)     Difficulty of Paying Living Expenses: Not hard at all   Food Insecurity: No Food Insecurity (2024)    Hunger Vital Sign     Worried About Running Out of Food in the Last Year: Never true     Ran Out of Food in the Last Year: Never true   Physical Activity: Inactive (2024)    Exercise Vital Sign     Days of Exercise per Week: 0 days     Minutes of Exercise per Session: 10 min   Stress: No Stress Concern Present (2024)    [place] [employer] - Occupational Stress Questionnaire     Feeling of Stress : Not at all   Housing Stability: Unknown (2024)    Housing Stability Vital Sign     Unable to Pay for Housing in the Last Year: No       Review of patient's allergies indicates:   Allergen Reactions    Latex     Adhesive Rash     Patients states "surgical tape."    Latex, natural rubber Rash        Health Maintenance Due   Topic Date Due    TETANUS VACCINE  Never done    Shingles Vaccine (1 of 2) Never done    RSV Vaccine (Age 60+ and Pregnant patients) (1 - Risk 60-74 years 1-dose series) Never done       Objective   Vitals:    25 0928   BP: 130/70   Pulse: 90        Physical Exam  Constitutional:       General: He is not in acute distress.     " Appearance: Normal appearance. He is well-developed.   HENT:      Head: Normocephalic and atraumatic.      Right Ear: External ear normal.      Left Ear: External ear normal.   Eyes:      Conjunctiva/sclera: Conjunctivae normal.   Cardiovascular:      Rate and Rhythm: Normal rate and regular rhythm.      Heart sounds: No murmur heard.     No friction rub. No gallop.   Pulmonary:      Effort: Pulmonary effort is normal. No respiratory distress.      Breath sounds: No wheezing, rhonchi or rales.   Abdominal:      General: Abdomen is flat. There is no distension.   Musculoskeletal:         General: No swelling or deformity.      Right lower leg: No edema.      Left lower leg: No edema.   Skin:     General: Skin is warm and dry.      Coloration: Skin is not jaundiced.   Neurological:      Mental Status: He is alert and oriented to person, place, and time. Mental status is at baseline.   Psychiatric:         Attention and Perception: Attention and perception normal.         Mood and Affect: Mood normal.         Speech: Speech normal.         Behavior: Behavior normal. Behavior is cooperative.         Thought Content: Thought content normal.         Cognition and Memory: Cognition normal.         Judgment: Judgment normal.          Assessment & Plan   1. Stage 3b chronic kidney disease  Assessment & Plan:  Labs today. Continue sodium bicarbonate.    Orders:  -     Comprehensive Metabolic Panel; Future; Expected date: 01/28/2025  -     CBC Without Differential; Future; Expected date: 01/28/2025  -     PTH, Intact; Future; Expected date: 01/28/2025  -     Phosphorus; Future; Expected date: 01/28/2025  -     Protein/Creatinine Ratio, Urine; Future  -     Calcitriol; Future; Expected date: 01/28/2025  -     sodium bicarbonate 650 MG tablet; Take 1 tablet (650 mg total) by mouth once daily.  Dispense: 90 tablet; Refill: 3    2. Essential hypertension  Assessment & Plan:  Controlled. Continue losartan.     Orders:  -      losartan (COZAAR) 100 MG tablet; Take 1 tablet (100 mg total) by mouth once daily.  Dispense: 90 tablet; Refill: 3    3. Mixed hyperlipidemia  -     Lipid Panel; Future; Expected date: 01/28/2025  -     atorvastatin (LIPITOR) 20 MG tablet; Take 1 tablet (20 mg total) by mouth once daily.  Dispense: 90 tablet; Refill: 3    4. Chronic sinusitis, unspecified location  Assessment & Plan:  Patient continues to take loratadine-pseudoephedrine for sinus congestion. We discussed the risk of chronic pseudoephedrine use at previous visits and I again encouraged him to wean off. Continue to monitor.       5. Gastroesophageal reflux disease, unspecified whether esophagitis present  -     pantoprazole (PROTONIX) 40 MG tablet; Take 1 tablet (40 mg total) by mouth once daily.  Dispense: 90 tablet; Refill: 3    6. Muscle spasm  -     tiZANidine (ZANAFLEX) 4 MG tablet; Take 1 tablet (4 mg total) by mouth every 8 (eight) hours.  Dispense: 90 tablet; Refill: 3    Visit today included increased complexity associated with the care of the episodic problem muscle spasm addressed and managing the longitudinal care of the patient due to the serious and/or complex managed problem(s) chronic kidney disease, hypertension, hyperlipidemia.      Follow up in 6 months.     Thierno Watson MD  01/28/2025

## 2025-01-28 NOTE — ASSESSMENT & PLAN NOTE
Patient continues to take loratadine-pseudoephedrine for sinus congestion. We discussed the risk of chronic pseudoephedrine use at previous visits and I again encouraged him to wean off. Continue to monitor.

## 2025-01-31 LAB — 1,25(OH)2D3 SERPL-MCNC: 52 PG/ML (ref 20–79)

## 2025-02-01 ENCOUNTER — DOCUMENTATION ONLY (OUTPATIENT)
Dept: FAMILY MEDICINE | Facility: CLINIC | Age: 75
End: 2025-02-01
Payer: MEDICARE

## 2025-02-02 NOTE — PROGRESS NOTES
denied      KATIE NEVAREZ (Key: [id number])  PA Case ID #: [id number]  Need Help? Call us at [phone]  Status  Sent to Plan today  Drug  Sodium Bicarbonate 650MG tablets    Form  [employer] Medicare Part D Electronic Prior Authorization Form (2017 NCPDP)

## 2025-02-24 DIAGNOSIS — Z00.00 ENCOUNTER FOR MEDICARE ANNUAL WELLNESS EXAM: ICD-10-CM

## 2025-03-05 ENCOUNTER — OFFICE VISIT (OUTPATIENT)
Dept: OPTOMETRY | Facility: CLINIC | Age: 75
End: 2025-03-05
Payer: MEDICARE

## 2025-03-05 DIAGNOSIS — Z86.69 HISTORY OF RETINAL DETACHMENT: ICD-10-CM

## 2025-03-05 DIAGNOSIS — H21.562 AFFERENT PUPILLARY DEFECT OF LEFT EYE: ICD-10-CM

## 2025-03-05 DIAGNOSIS — H52.03 HYPEROPIA WITH ASTIGMATISM AND PRESBYOPIA, BILATERAL: ICD-10-CM

## 2025-03-05 DIAGNOSIS — H52.4 HYPEROPIA WITH ASTIGMATISM AND PRESBYOPIA, BILATERAL: ICD-10-CM

## 2025-03-05 DIAGNOSIS — H52.203 HYPEROPIA WITH ASTIGMATISM AND PRESBYOPIA, BILATERAL: ICD-10-CM

## 2025-03-05 DIAGNOSIS — H04.123 DRY EYE SYNDROME OF BILATERAL LACRIMAL GLANDS: Primary | ICD-10-CM

## 2025-03-05 DIAGNOSIS — Z96.1 PSEUDOPHAKIA OF BOTH EYES: ICD-10-CM

## 2025-03-05 PROCEDURE — 99999 PR PBB SHADOW E&M-EST. PATIENT-LVL III: CPT | Mod: PBBFAC,,,

## 2025-03-05 PROCEDURE — 99213 OFFICE O/P EST LOW 20 MIN: CPT | Mod: PBBFAC,PO

## 2025-03-05 PROCEDURE — 92015 DETERMINE REFRACTIVE STATE: CPT | Mod: ,,,

## 2025-03-05 PROCEDURE — 99213 OFFICE O/P EST LOW 20 MIN: CPT | Mod: S$PBB,,,

## 2025-03-05 NOTE — PROGRESS NOTES
HPI    Pt here for eye exam. Last seen 01/2025 w/Dr. Mcintyre.    Pt sees Dr. Mcintyre Q 6 mo. Last visit was 4-5 wks ago. Specs are working   well, needs update. TheraTears QHS.  Last edited by Ally Broussard, OD on 3/5/2025  4:43 PM.            Assessment /Plan     For exam results, see Encounter Report.    Dry eye syndrome of bilateral lacrimal glands    History of retinal detachment    Afferent pupillary defect of left eye    Pseudophakia of both eyes    Hyperopia with astigmatism and presbyopia, bilateral      Mild signs and symptoms, pt reports relief with TheraTears QHS OU. Advised continued use of drops BID-QID OU daily for relief. Ed pt to RTC if any worsening signs or symptoms. Otherwise, monitor.    2-3. Pt has history of retinal detachment OS following cataract surgery and had 5 surgeries to repair it (in [place], [place]). Pt followed by outside retina doctor q6months. Last DFE 01/2025. No DFE done today. (+)APD OS with iris atrophy / irregularity OS and count fingers acuity. Advised continued follow-up with Dr. Mcintyre. Optom prn for spec needs.    4. Multifocal PCIOL OU with dense PCO OS. YAG unlikely to improve vision OS given retina status. Monitor yearly for changes, sooner prn.    5. Discussed spectacle options with pt and released final spec rx. Ed pt on change in rx and adaptation.    RTC: as scheduled with outside retina, optom prn.

## 2025-05-01 ENCOUNTER — OFFICE VISIT (OUTPATIENT)
Dept: FAMILY MEDICINE | Facility: CLINIC | Age: 75
End: 2025-05-01
Payer: MEDICARE

## 2025-05-01 VITALS
HEART RATE: 75 BPM | DIASTOLIC BLOOD PRESSURE: 82 MMHG | BODY MASS INDEX: 30.79 KG/M2 | HEIGHT: 71 IN | OXYGEN SATURATION: 95 % | SYSTOLIC BLOOD PRESSURE: 120 MMHG | WEIGHT: 219.94 LBS

## 2025-05-01 DIAGNOSIS — M72.2 PLANTAR FASCIITIS OF LEFT FOOT: ICD-10-CM

## 2025-05-01 DIAGNOSIS — K57.90 DIVERTICULOSIS: ICD-10-CM

## 2025-05-01 DIAGNOSIS — Z00.00 ENCOUNTER FOR MEDICARE ANNUAL WELLNESS EXAM: Primary | ICD-10-CM

## 2025-05-01 DIAGNOSIS — I10 ESSENTIAL HYPERTENSION: ICD-10-CM

## 2025-05-01 DIAGNOSIS — K21.9 GASTROESOPHAGEAL REFLUX DISEASE, UNSPECIFIED WHETHER ESOPHAGITIS PRESENT: ICD-10-CM

## 2025-05-01 DIAGNOSIS — N18.32 STAGE 3B CHRONIC KIDNEY DISEASE: ICD-10-CM

## 2025-05-01 DIAGNOSIS — E78.2 MIXED HYPERLIPIDEMIA: ICD-10-CM

## 2025-05-01 DIAGNOSIS — J32.9 CHRONIC SINUSITIS, UNSPECIFIED LOCATION: ICD-10-CM

## 2025-05-01 PROCEDURE — 99214 OFFICE O/P EST MOD 30 MIN: CPT | Mod: PBBFAC,PN | Performed by: NURSE PRACTITIONER

## 2025-05-01 PROCEDURE — 99999 PR PBB SHADOW E&M-EST. PATIENT-LVL IV: CPT | Mod: PBBFAC,,, | Performed by: NURSE PRACTITIONER

## 2025-05-01 NOTE — PATIENT INSTRUCTIONS
Counseling and Referral of Other Preventative  (Italic type indicates deductible and co-insurance are waived)    Patient Name: Dayton Faye  Today's Date: 5/1/2025    Health Maintenance       Date Due Completion Date    TETANUS VACCINE Never done ---    Shingles Vaccine (1 of 2) Never done ---    RSV Vaccine (Age 60+ and Pregnant patients) (1 - Risk 60-74 years 1-dose series) Never done ---    Lipid Panel 01/28/2030 1/28/2025    Colorectal Cancer Screening 04/08/2031 4/8/2024        No orders of the defined types were placed in this encounter.      The following information is provided to all patients.  This information is to help you find resources for any of the problems found today that may be affecting your health:                  Living healthy guide: www.[facility].[place].gov      Understanding Diabetes: www.diabetes.org      Eating healthy: www.cdc.gov/healthyweight      [facility] home safety checklist: www.cdc.gov/steadi/patient.html      Agency on Aging: www.goea.[place].gov      Alcoholics anonymous (AA): www.aa.org      Physical Activity: www.catrachito.nih.gov/ev9hlfv      Tobacco use: www.quitwithusla.org

## 2025-05-12 ENCOUNTER — TELEPHONE (OUTPATIENT)
Dept: FAMILY MEDICINE | Facility: CLINIC | Age: 75
End: 2025-05-12
Payer: MEDICARE

## 2025-05-12 NOTE — TELEPHONE ENCOUNTER
----- Message from Myranda sent at 5/12/2025 11:46 AM CDT -----  Contact: [phone] Patient  .1MEDICALADVICE Patient is calling for Medical Advice regarding: Pt is calling in regards to needing to renew his Handicap sticker please. Pt is asking if the nurse can call when the form is ready to be picked up please. Thank you. How long has patient had these symptoms:N/APharmacy name and phone#:N/APatient wants a call back or thru [facility]:Comments:Please advise patient replies from provider may take up to 48 hours.

## 2025-05-13 ENCOUNTER — TELEPHONE (OUTPATIENT)
Dept: FAMILY MEDICINE | Facility: CLINIC | Age: 75
End: 2025-05-13
Payer: MEDICARE

## 2025-05-13 NOTE — TELEPHONE ENCOUNTER
----- Message from Caridad sent at 5/13/2025  1:47 PM CDT -----  Type:  Needs Medical Advice Who Called: Pt   Would the patient rather a call back or a response via [facility]? Call back Best Call Back Number: [phone] Additional Information: Pt is calling to get a temporary handicap sticker/tag for car       Please call Back to advise. Thanks!

## 2025-05-13 NOTE — PROGRESS NOTES
"  Dayton Faye presented for an initial Medicare AWV today. The following components were reviewed and updated:    Medical history  Family History  Social history  Allergies and Current Medications  Health Risk Assessment  Health Maintenance  Care Team    **See Completed Assessments for Annual Wellness visit with in the encounter summary    The following assessments were completed:  Depression Screening  Cognitive function Screening    Timed Get Up Test  Whisper Test      Opioid documentation:      Patient does not have a current opioid prescription.          Vitals:    05/01/25 1317   BP: 120/82   Pulse: 75   SpO2: 95%   Weight: 99.7 kg (219 lb 14.5 oz)   Height: 5' 11" (1.803 m)     Body mass index is 30.67 kg/m².       Physical Exam  Vitals reviewed.   Constitutional:       General: He is not in acute distress.  HENT:      Head: Normocephalic.   Cardiovascular:      Rate and Rhythm: Normal rate.   Pulmonary:      Effort: Pulmonary effort is normal. No respiratory distress.   Skin:     General: Skin is warm.   Neurological:      General: No focal deficit present.      Mental Status: He is alert.   Psychiatric:         Mood and Affect: Mood normal.           Diagnoses and health risks identified today and associated recommendations/orders:  1. Encounter for Medicare annual wellness exam  Reviewed health maintenance and provided recommendations    Recommend tdap, shingrix, rsv vaccines  - Referral to Enhanced Annual Wellness Visit (eAWV) W+1    2. Stage 3b chronic kidney disease  Continue to monitor  Followed by Thierno Watson MD   Encourage adequate water intake.      3. Essential hypertension  Continue to monitor  Followed by Thierno Watson MD .      4. Chronic sinusitis, unspecified location  Continue to monitor  Followed by Thierno Watson MD .      5. Mixed hyperlipidemia  Continue to monitor  Followed by Thierno Watson MD .      6. Diverticulosis  Continue to monitor  Followed by Walter" Thierno MCKEON MD .      7. Gastroesophageal reflux disease, unspecified whether esophagitis present  Continue to monitor  Followed by Thierno Watson MD .      8. Plantar fasciitis of left foot  Continue to monitor  Followed by Thierno Watson MD .        Provided Dayton with a 5-10 year written screening schedule and personal prevention plan. Recommendations were developed using the USPSTF age appropriate recommendations. Education, counseling, and referrals were provided as needed.  After Visit Summary printed and given to patient which includes a list of additional screenings\tests needed.    No follow-ups on file.      Jennifer Fletcher NP

## 2025-05-29 DIAGNOSIS — I10 ESSENTIAL HYPERTENSION: ICD-10-CM

## 2025-05-29 RX ORDER — LOSARTAN POTASSIUM 100 MG/1
100 TABLET ORAL
Qty: 90 TABLET | Refills: 2 | Status: SHIPPED | OUTPATIENT
Start: 2025-05-29

## 2025-05-29 NOTE — TELEPHONE ENCOUNTER
Refill Decision Note   Dayton Faye  is requesting a refill authorization.  Brief Assessment and Rationale for Refill:  Approve     Medication Therapy Plan:        Pharmacist review requested: Yes   Extended chart review required: Yes   Comments:     Note composed:6:37 PM 05/29/2025

## 2025-05-29 NOTE — TELEPHONE ENCOUNTER
No care due was identified.  [facility] Embedded Care Due Messages. Reference number: [id number].   5/29/2025 2:33:47 PM CDT

## 2025-05-29 NOTE — TELEPHONE ENCOUNTER
Refill Routing Note   Medication(s) are not appropriate for processing by [facility] for the following reason(s):        Required labs abnormal    [facility] action(s):  Defer           Pharmacist review requested: Yes     Appointments  past 12m or future 3m with PCP    Date Provider   Last Visit   1/28/2025 Thierno Watson MD   Next Visit   7/29/2025 Thierno Watson MD   ED visits in past 90 days: 0        Note composed:5:25 PM 05/29/2025

## 2025-07-29 ENCOUNTER — LAB VISIT (OUTPATIENT)
Dept: LAB | Facility: HOSPITAL | Age: 75
End: 2025-07-29
Attending: STUDENT IN AN ORGANIZED HEALTH CARE EDUCATION/TRAINING PROGRAM
Payer: MEDICARE

## 2025-07-29 ENCOUNTER — OFFICE VISIT (OUTPATIENT)
Dept: FAMILY MEDICINE | Facility: CLINIC | Age: 75
End: 2025-07-29
Payer: MEDICARE

## 2025-07-29 VITALS
HEART RATE: 85 BPM | OXYGEN SATURATION: 97 % | SYSTOLIC BLOOD PRESSURE: 132 MMHG | WEIGHT: 219.38 LBS | BODY MASS INDEX: 30.59 KG/M2 | DIASTOLIC BLOOD PRESSURE: 70 MMHG

## 2025-07-29 DIAGNOSIS — M25.561 CHRONIC PAIN OF RIGHT KNEE: ICD-10-CM

## 2025-07-29 DIAGNOSIS — G89.29 CHRONIC PAIN OF RIGHT KNEE: ICD-10-CM

## 2025-07-29 DIAGNOSIS — R06.09 DOE (DYSPNEA ON EXERTION): ICD-10-CM

## 2025-07-29 DIAGNOSIS — N18.32 STAGE 3B CHRONIC KIDNEY DISEASE: Primary | ICD-10-CM

## 2025-07-29 DIAGNOSIS — N52.9 ERECTILE DYSFUNCTION, UNSPECIFIED ERECTILE DYSFUNCTION TYPE: ICD-10-CM

## 2025-07-29 DIAGNOSIS — M54.50 CHRONIC LEFT-SIDED LOW BACK PAIN WITHOUT SCIATICA: ICD-10-CM

## 2025-07-29 DIAGNOSIS — G89.29 CHRONIC LEFT-SIDED LOW BACK PAIN WITHOUT SCIATICA: ICD-10-CM

## 2025-07-29 DIAGNOSIS — N18.32 STAGE 3B CHRONIC KIDNEY DISEASE: ICD-10-CM

## 2025-07-29 LAB
ALBUMIN SERPL BCP-MCNC: 4 G/DL (ref 3.5–5.2)
ALP SERPL-CCNC: 70 UNIT/L (ref 40–150)
ALT SERPL W/O P-5'-P-CCNC: 23 UNIT/L (ref 10–44)
ANION GAP (OHS): 9 MMOL/L (ref 8–16)
AST SERPL-CCNC: 22 UNIT/L (ref 11–45)
BILIRUB SERPL-MCNC: 0.6 MG/DL (ref 0.1–1)
BUN SERPL-MCNC: 22 MG/DL (ref 8–23)
CALCIUM SERPL-MCNC: 9 MG/DL (ref 8.7–10.5)
CHLORIDE SERPL-SCNC: 112 MMOL/L (ref 95–110)
CO2 SERPL-SCNC: 20 MMOL/L (ref 23–29)
CREAT SERPL-MCNC: 1.8 MG/DL (ref 0.5–1.4)
ERYTHROCYTE [DISTWIDTH] IN BLOOD BY AUTOMATED COUNT: 13.3 % (ref 11.5–14.5)
GFR SERPLBLD CREATININE-BSD FMLA CKD-EPI: 39 ML/MIN/1.73/M2
GLUCOSE SERPL-MCNC: 100 MG/DL (ref 70–110)
HCT VFR BLD AUTO: 42.8 % (ref 40–54)
HGB BLD-MCNC: 13.8 GM/DL (ref 14–18)
MCH RBC QN AUTO: 31.4 PG (ref 27–31)
MCHC RBC AUTO-ENTMCNC: 32.2 G/DL (ref 32–36)
MCV RBC AUTO: 98 FL (ref 82–98)
PLATELET # BLD AUTO: 208 K/UL (ref 150–450)
PMV BLD AUTO: 11.2 FL (ref 9.2–12.9)
POTASSIUM SERPL-SCNC: 4.7 MMOL/L (ref 3.5–5.1)
PROT SERPL-MCNC: 7.4 GM/DL (ref 6–8.4)
RBC # BLD AUTO: 4.39 M/UL (ref 4.6–6.2)
SODIUM SERPL-SCNC: 141 MMOL/L (ref 136–145)
WBC # BLD AUTO: 8.42 K/UL (ref 3.9–12.7)

## 2025-07-29 PROCEDURE — 99214 OFFICE O/P EST MOD 30 MIN: CPT | Mod: PBBFAC,PO | Performed by: STUDENT IN AN ORGANIZED HEALTH CARE EDUCATION/TRAINING PROGRAM

## 2025-07-29 PROCEDURE — 99999 PR PBB SHADOW E&M-EST. PATIENT-LVL IV: CPT | Mod: PBBFAC,,, | Performed by: STUDENT IN AN ORGANIZED HEALTH CARE EDUCATION/TRAINING PROGRAM

## 2025-07-29 PROCEDURE — 99214 OFFICE O/P EST MOD 30 MIN: CPT | Mod: S$PBB,,, | Performed by: STUDENT IN AN ORGANIZED HEALTH CARE EDUCATION/TRAINING PROGRAM

## 2025-07-29 PROCEDURE — G2211 COMPLEX E/M VISIT ADD ON: HCPCS | Mod: ,,, | Performed by: STUDENT IN AN ORGANIZED HEALTH CARE EDUCATION/TRAINING PROGRAM

## 2025-07-29 PROCEDURE — 85027 COMPLETE CBC AUTOMATED: CPT

## 2025-07-29 PROCEDURE — 82040 ASSAY OF SERUM ALBUMIN: CPT | Mod: PO

## 2025-07-29 PROCEDURE — 36415 COLL VENOUS BLD VENIPUNCTURE: CPT | Mod: PO

## 2025-07-29 RX ORDER — ACETAMINOPHEN 500 MG
500 TABLET ORAL EVERY 6 HOURS PRN
COMMUNITY

## 2025-07-29 NOTE — ASSESSMENT & PLAN NOTE
Patient asked about establishing with cardiology as he previously had a cardiologist many years ago and would regularly have stress testing. We'll get a new stress echo to start - I have a low threshold to refer to cardiology given his chronic kidney disease.

## 2025-07-29 NOTE — ASSESSMENT & PLAN NOTE
Patient asked for referral to urology. He previously had tried sildenafil and tadalafil with little success and needed injections through his prior urologist. Referral as listed.

## 2025-07-29 NOTE — ASSESSMENT & PLAN NOTE
Patient has been taking Tylenol regularly but will occasionally take tizanidine. Continue current regimen.

## 2025-07-29 NOTE — PROGRESS NOTES
Name: Dayton Faye  MRN: [id number]  : 1950  PCP: Thierno Watson MD    Subjective   HPI  Patient presents for regular checkup.     Review of Systems   HENT:  Negative for hearing loss.    Eyes:  Negative for visual disturbance.   Respiratory:  Positive for shortness of breath (exertional - walking down the hallway).    Cardiovascular:  Negative for chest pain.   Gastrointestinal:  Negative for constipation, diarrhea, nausea and vomiting.   Musculoskeletal:  Negative for arthralgias and back pain.   Neurological:  Positive for light-headedness (rare). Negative for dizziness.   Psychiatric/Behavioral:  Negative for dysphoric mood and sleep disturbance. The patient is not nervous/anxious.         Problem List[1]    Medications Ordered Prior to Encounter[2]    Past Medical History:   Diagnosis Date    Arthritis     General anesthetics causing adverse effect in therapeutic use     takes longer to wake up after anesthesia    Hypertension     Preventative meds begun at age 50    PONV (postoperative nausea and vomiting)     Pre-diabetes     Respiratory distress     Cardiologist prescribed    Retinal detachment     Skin cancer     face       Past Surgical History:   Procedure Laterality Date    BACK SURGERY      CATARACT EXTRACTION Bilateral     COLONOSCOPY N/A 2024    Procedure: COLONOSCOPY;  Surgeon: MEJIA Mccabe MD;  Location: [facility];  Service: Endoscopy;  Laterality: N/A;    EYE SURGERY      HAND SURGERY      Same date as left shoulder    HIP SURGERY Right     JOINT REPLACEMENT      Right hip joint replaced    KNEE SURGERY Left     RETINAL DETACHMENT SURGERY Left     ROBOTIC ARTHROPLASTY, KNEE Right 2023    Procedure: ROBOTIC ARTHROPLASTY, KNEE, TOTAL;  Surgeon: Fili Scott MD;  Location: [facility];  Service: Orthopedics;  Laterality: Right;    SHOULDER SURGERY Right     SHOULDER SURGERY Left     SPINAL FUSION  3/15/2016    LL 3,4 and 5    SPINE SURGERY      lumbar  fusion        Family History   Problem Relation Name Age of Onset    Macular degeneration Father Haris Faye     Heart disease Father Haris Faye     Arthritis Father Haris Faye          at age 92    Hypertension Father Haris Faye     Vision loss Father Haris Faye     No Known Problems Sister 1/2     No Known Problems Brother      Cancer Paternal Grandmother Kathy Faye         Lung    Cancer Paternal Grandfather Hunter Faye         Prostate    Glaucoma Neg Hx         Social History[3]    Review of patient's allergies indicates:   Allergen Reactions    Latex         Health Maintenance Due   Topic Date Due    Annual UACr  Never done    TETANUS VACCINE  Never done    Shingles Vaccine (1 of 2) Never done    RSV Vaccine (Age 60+ and Pregnant patients) (1 - Risk 60-74 years 1-dose series) Never done       Objective   Vitals:    25 1028   BP: 132/70   Pulse: 85      Wt Readings from Last 1 Encounters:   25 99.5 kg (219 lb 5.7 oz)   ]  Body mass index is 30.59 kg/m².    Physical Exam  Constitutional:       General: He is not in acute distress.     Appearance: Normal appearance. He is well-developed.   HENT:      Head: Normocephalic and atraumatic.      Right Ear: External ear normal.      Left Ear: External ear normal.   Eyes:      Conjunctiva/sclera: Conjunctivae normal.   Cardiovascular:      Rate and Rhythm: Normal rate and regular rhythm.      Heart sounds: No murmur heard.     No friction rub. No gallop.   Pulmonary:      Effort: Pulmonary effort is normal. No respiratory distress.      Breath sounds: No wheezing, rhonchi or rales.   Abdominal:      General: Abdomen is flat. There is no distension.   Musculoskeletal:         General: No swelling or deformity.      Right lower leg: No edema.      Left lower leg: No edema.   Skin:     General: Skin is warm and dry.      Coloration: Skin is not jaundiced.   Neurological:      Mental Status: He is alert and oriented to  person, place, and time. Mental status is at baseline.   Psychiatric:         Attention and Perception: Attention and perception normal.         Mood and Affect: Mood normal.         Speech: Speech normal.         Behavior: Behavior normal. Behavior is cooperative.         Thought Content: Thought content normal.         Cognition and Memory: Cognition normal.         Judgment: Judgment normal.          Assessment & Plan    1. Stage 3b chronic kidney disease  -     CBC Without Differential; Future; Expected date: 07/29/2025  -     Comprehensive Metabolic Panel; Future; Expected date: 07/29/2025    2. BRUCE (dyspnea on exertion)  Assessment & Plan:  Patient asked about establishing with cardiology as he previously had a cardiologist many years ago and would regularly have stress testing. We'll get a new stress echo to start - I have a low threshold to refer to cardiology given his chronic kidney disease.    Orders:  -     Stress Echo Which stress agent will be used? Pharmacological; Color Flow Doppler? No; Future    3. Erectile dysfunction, unspecified erectile dysfunction type  Assessment & Plan:  Patient asked for referral to urology. He previously had tried sildenafil and tadalafil with little success and needed injections through his prior urologist. Referral as listed.    Orders:  -     Ambulatory referral/consult to Urology; Future; Expected date: 08/05/2025    4. Chronic pain of right knee  Assessment & Plan:  Patient has been taking Tylenol regularly but will occasionally take tizanidine. Continue current regimen.      5. Chronic left-sided low back pain without sciatica  Assessment & Plan:  See plan for knee pain.      Visit today included increased complexity associated with the care of the episodic problem dyspnea on exertion addressed and managing the longitudinal care of the patient due to the serious and/or complex managed problem(s) as above.       Follow up in 6 months.     Thierno Watson,  MD  07/29/2025          [1]   Patient Active Problem List  Diagnosis    Essential hypertension    Mixed hyperlipidemia    Gastroesophageal reflux disease    Plantar fasciitis of left foot    Chronic pain of right knee    Muscle spasm    Chronic sinusitis    Lower extremity edema    BRUCE (dyspnea on exertion)    Stage 3b chronic kidney disease    Chronic left-sided low back pain without sciatica    Diverticulosis    Hip problem, left    Fall    Erectile dysfunction   [2]   Current Outpatient Medications on File Prior to Visit   Medication Sig Dispense Refill    acetaminophen (TYLENOL) 500 MG tablet Take 500 mg by mouth every 6 (six) hours as needed for Pain.      atorvastatin (LIPITOR) 20 MG tablet Take 1 tablet (20 mg total) by mouth once daily. 90 tablet 3    CLARITIN-D 24 HOUR  mg per 24 hr tablet Take 1 tablet by mouth.      coenzyme Q10 100 mg capsule Take 200 mg by mouth.      ginkgo biloba 120 mg Tab Take by mouth once daily.      krill-om-3-dha-epa-phospho-ast 586-139-46-64 mg Cap Take by mouth once daily.      losartan (COZAAR) 100 MG tablet TAKE 1 TABLET(100 MG) BY MOUTH EVERY DAY 90 tablet 2    mv-min-folic acid-lutein 500-250 mcg Chew Take by mouth.      pantoprazole (PROTONIX) 40 MG tablet Take 1 tablet (40 mg total) by mouth once daily. 90 tablet 3    prednisoLONE acetate (PRED MILD) 0.12 % ophthalmic suspension Place 1 drop into the left eye every morning.      sodium bicarbonate 650 MG tablet Take 1 tablet (650 mg total) by mouth once daily. 90 tablet 3    tiZANidine (ZANAFLEX) 4 MG tablet Take 1 tablet (4 mg total) by mouth every 8 (eight) hours. 90 tablet 3    vit C/E/Zn/coppr/lutein/zeaxan (PRESERVISION AREDS-2 ORAL) Take by mouth 2 (two) times a day.       No current facility-administered medications on file prior to visit.   [3]   Social History  Socioeconomic History    Marital status:    Occupational History    Occupation: School psychologist     Comment: retired   Tobacco Use     Smoking status: Former     Current packs/day: 0.00     Average packs/day: 0.5 packs/day for 5.0 years (2.5 ttl pk-yrs)     Types: Cigarettes, Pipe     Start date: 1986     Quit date: 1991     Years since quittin.5    Smokeless tobacco: Never   Substance and Sexual Activity    Alcohol use: Not Currently     Comment: Very occasionally    Drug use: Never    Sexual activity: Yes     Partners: Female     Birth control/protection: Post-menopausal   Social History Narrative    Lives with female partner.  Moved here from [place]. Three cats. Enjoys ancestry, reading.     Social Drivers of Health     Financial Resource Strain: Low Risk  (2025)    Overall Financial Resource Strain (CARDIA)     Difficulty of Paying Living Expenses: Not hard at all   Food Insecurity: No Food Insecurity (2025)    Hunger Vital Sign     Worried About Running Out of Food in the Last Year: Never true     Ran Out of Food in the Last Year: Never true   Transportation Needs: No Transportation Needs (2025)    PRAPARE - Transportation     Lack of Transportation (Medical): No     Lack of Transportation (Non-Medical): No   Physical Activity: Inactive (2025)    Exercise Vital Sign     Days of Exercise per Week: 0 days     Minutes of Exercise per Session: 0 min   Stress: No Stress Concern Present (2025)    [employer] - Occupational Stress Questionnaire     Feeling of Stress : Not at all   Housing Stability: Low Risk  (2025)    Housing Stability Vital Sign     Unable to Pay for Housing in the Last Year: No     Number of Times Moved in the Last Year: 0     Homeless in the Last Year: No

## 2025-08-04 ENCOUNTER — HOSPITAL ENCOUNTER (OUTPATIENT)
Dept: CARDIOLOGY | Facility: HOSPITAL | Age: 75
Discharge: HOME | End: 2025-08-04
Attending: STUDENT IN AN ORGANIZED HEALTH CARE EDUCATION/TRAINING PROGRAM
Payer: MEDICARE

## 2025-08-04 VITALS — BODY MASS INDEX: 30.66 KG/M2 | WEIGHT: 219 LBS | HEIGHT: 71 IN

## 2025-08-04 DIAGNOSIS — R06.09 DOE (DYSPNEA ON EXERTION): ICD-10-CM

## 2025-08-04 LAB
AORTIC SIZE INDEX (SOV): 1.5 CM/M2
AORTIC SIZE INDEX: 1.5 CM/M2
ASCENDING AORTA: 3.2 CM
AV INDEX (PROSTH): 0.75
AV MEAN GRADIENT: 4 MMHG
AV PEAK GRADIENT: 9 MMHG
AV VALVE AREA BY VELOCITY RATIO: 2.5 CM²
AV VALVE AREA: 2.4 CM²
AV VELOCITY RATIO: 0.8
BSA FOR ECHO PROCEDURE: 2.23 M2
CV ECHO LV RWT: 0.52 CM
CV STRESS BASE HR: 54 BPM
DIASTOLIC BLOOD PRESSURE: 92 MMHG
DOP CALC AO PEAK VEL: 1.5 M/S
DOP CALC AO VTI: 31.6 CM
DOP CALC LVOT AREA: 3.1 CM2
DOP CALC LVOT DIAMETER: 2 CM
DOP CALC LVOT PEAK VEL: 1.2 M/S
DOP CALCLVOT PEAK VEL VTI: 23.8 CM
E WAVE DECELERATION TIME: 307 MSEC
E/A RATIO: 0.98
E/E' RATIO: 9 M/S
ECHO LV POSTERIOR WALL: 1.1 CM (ref 0.6–1.1)
FRACTIONAL SHORTENING: 38.1 % (ref 28–44)
INTERVENTRICULAR SEPTUM: 0.9 CM (ref 0.6–1.1)
IVRT: 110 MSEC
LEFT ATRIUM AREA SYSTOLIC (APICAL 2 CHAMBER): 18.95 CM2
LEFT ATRIUM AREA SYSTOLIC (APICAL 4 CHAMBER): 19.76 CM2
LEFT ATRIUM SIZE: 3.8 CM
LEFT ATRIUM VOLUME INDEX MOD: 24 ML/M2
LEFT ATRIUM VOLUME MOD: 52 ML
LEFT INTERNAL DIMENSION IN SYSTOLE: 2.6 CM (ref 2.1–4)
LEFT VENTRICLE DIASTOLIC VOLUME INDEX: 35.16 ML/M2
LEFT VENTRICLE DIASTOLIC VOLUME: 77 ML
LEFT VENTRICLE END SYSTOLIC VOLUME APICAL 2 CHAMBER: 51.92 ML
LEFT VENTRICLE END SYSTOLIC VOLUME APICAL 4 CHAMBER: 51.08 ML
LEFT VENTRICLE MASS INDEX: 62.7 G/M2
LEFT VENTRICLE SYSTOLIC VOLUME INDEX: 11 ML/M2
LEFT VENTRICLE SYSTOLIC VOLUME: 24 ML
LEFT VENTRICULAR INTERNAL DIMENSION IN DIASTOLE: 4.2 CM (ref 3.5–6)
LEFT VENTRICULAR MASS: 137.2 G
LV LATERAL E/E' RATIO: 7.9 M/S
LV SEPTAL E/E' RATIO: 11.3 M/S
LVED V (TEICH): 76.8 ML
LVES V (TEICH): 23.79 ML
LVOT MG: 2.67 MMHG
LVOT MV: 0.73 CM/S
Lab: 1.8 CM/M
Lab: 1.8 CM/M
MV PEAK A VEL: 0.81 M/S
MV PEAK E VEL: 0.79 M/S
MV STENOSIS PRESSURE HALF TIME: 89.17 MS
MV VALVE AREA P 1/2 METHOD: 2.47 CM2
OHS CV CPX 85 PERCENT MAX PREDICTED HEART RATE MALE: 124
OHS CV CPX MAX PREDICTED HEART RATE: 146
OHS CV CPX PATIENT HEIGHT IN: 71
OHS CV CPX PATIENT IS FEMALE: 0
OHS CV CPX PATIENT IS MALE: 1
OHS CV CPX PEAK DIASTOLIC BLOOD PRESSURE: 92 MMHG
OHS CV CPX PEAK HEAR RATE: 127 BPM
OHS CV CPX PEAK RATE PRESSURE PRODUCT: NORMAL
OHS CV CPX PEAK SYSTOLIC BLOOD PRESSURE: 154 MMHG
OHS CV CPX PERCENT MAX PREDICTED HEART RATE ACHIEVED: 87
OHS CV CPX RATE PRESSURE PRODUCT PRESENTING: 8316
OHS CV INITIAL DOSE: 10 MCG/KG/MIN
OHS CV PEAK DOSE: 40 MCG/KG/MIN
PISA TR MAX VEL: 1.6 M/S
PULM VEIN S/D RATIO: 1.5
PV PEAK D VEL: 0.4 M/S
PV PEAK S VEL: 0.6 M/S
RA PRESSURE ESTIMATED: 3 MMHG
RV TB RVSP: 5 MMHG
SINUS: 3.2 CM
STJ: 2.6 CM
SYSTOLIC BLOOD PRESSURE: 154 MMHG
TDI LATERAL: 0.1 M/S
TDI SEPTAL: 0.07 M/S
TDI: 0.09 M/S
TR MAX PG: 11 MMHG
TRICUSPID ANNULAR PLANE SYSTOLIC EXCURSION: 1.8 CM
TV REST PULMONARY ARTERY PRESSURE: 13 MMHG
Z-SCORE OF LEFT VENTRICULAR DIMENSION IN END DIASTOLE: -5.66
Z-SCORE OF LEFT VENTRICULAR DIMENSION IN END SYSTOLE: -4.33

## 2025-08-04 PROCEDURE — 93351 STRESS TTE COMPLETE: CPT | Mod: 26,,, | Performed by: INTERNAL MEDICINE

## 2025-08-04 PROCEDURE — 93351 STRESS TTE COMPLETE: CPT | Mod: PO

## 2025-08-11 ENCOUNTER — OFFICE VISIT (OUTPATIENT)
Dept: UROLOGY | Facility: CLINIC | Age: 75
End: 2025-08-11
Payer: MEDICARE

## 2025-08-11 VITALS — HEIGHT: 71 IN | BODY MASS INDEX: 31.64 KG/M2 | WEIGHT: 226 LBS

## 2025-08-11 DIAGNOSIS — N52.9 ERECTILE DYSFUNCTION, UNSPECIFIED ERECTILE DYSFUNCTION TYPE: ICD-10-CM

## 2025-08-11 DIAGNOSIS — N52.01 ERECTILE DYSFUNCTION DUE TO ARTERIAL INSUFFICIENCY: ICD-10-CM

## 2025-08-11 PROCEDURE — 99214 OFFICE O/P EST MOD 30 MIN: CPT | Mod: PBBFAC,PO

## 2025-08-11 PROCEDURE — 99999 PR PBB SHADOW E&M-EST. PATIENT-LVL IV: CPT | Mod: PBBFAC,,,

## 2025-08-11 RX ORDER — TADALAFIL 20 MG/1
20 TABLET ORAL DAILY
Qty: 30 TABLET | Refills: 11 | Status: SHIPPED | OUTPATIENT
Start: 2025-08-11 | End: 2026-08-11

## (undated) DEVICE — KIT CHECKPOINT MAKO

## (undated) DEVICE — DRAPE STERI U-SHAPED 47X51IN

## (undated) DEVICE — DRAPE STERI INSTRUMENT 1018

## (undated) DEVICE — WRAP KNEE ACCU THERM GEL PACK

## (undated) DEVICE — BLADE TONGUE DEPRESSOR STRL

## (undated) DEVICE — SYR 50CC LL

## (undated) DEVICE — BLADE SURG CARBON STEEL #10

## (undated) DEVICE — GOWN TOGA SYS PEELWY ZIP 2 XL

## (undated) DEVICE — SLEEVE SCD EXPRESS KNEE MEDIUM

## (undated) DEVICE — DRAPE ORTH SPLIT 77X108IN

## (undated) DEVICE — BANDAGE MATRIX HK LOOP 6IN 5YD

## (undated) DEVICE — SET DECANTER MEDICHOICE

## (undated) DEVICE — PIN BONE 3.2X110MM
Type: IMPLANTABLE DEVICE | Site: KNEE | Status: NON-FUNCTIONAL
Removed: 2023-08-08

## (undated) DEVICE — TOGA FLYTE PEEL AWAY XLARGE

## (undated) DEVICE — BANDAGE ESMARK ELASTIC ST 6X9

## (undated) DEVICE — ADHESIVE DERMABOND ADVANCED

## (undated) DEVICE — SUT STRATAFIX PDS 1 CTX 18IN

## (undated) DEVICE — ALCOHOL 70% ANTISEPTIC ISO 4OZ

## (undated) DEVICE — SUT 2/0 18IN COATED VICRYL

## (undated) DEVICE — DRESSING AQUACEL AG ADV 3.5X12

## (undated) DEVICE — KIT TRIATHLON CR TIB PREP SZ6

## (undated) DEVICE — DRAPE INCISE IOBAN 2 23X17IN

## (undated) DEVICE — BLADE SURG CARBON STEEL SZ11

## (undated) DEVICE — UNDERGLOVES BIOGEL PI SIZE 8

## (undated) DEVICE — ELECTRODE REM PLYHSV RETURN 9

## (undated) DEVICE — CATH URETHRAL RED RUBBER 18FR

## (undated) DEVICE — TOURNIQUET SB QC DP 34X4IN

## (undated) DEVICE — BLADE MAKO STANDARD

## (undated) DEVICE — SCRUB 10% POVIDONE IODINE 4OZ

## (undated) DEVICE — DRESSING GAUZE OIL EMUL 3X8

## (undated) DEVICE — PAD ABDOMINAL STERILE 8X10IN

## (undated) DEVICE — TOWEL OR DISP STRL BLUE 4/PK

## (undated) DEVICE — STRAP OR TABLE 5IN X 72IN

## (undated) DEVICE — YANKAUER FLEX NO VENT HI CAP

## (undated) DEVICE — SOL NACL IRR 3000ML

## (undated) DEVICE — INTERPULSE SET

## (undated) DEVICE — GLOVE SURG ULTRA TOUCH 7.5

## (undated) DEVICE — WRAP PROTECTIVE LEG POS STRL

## (undated) DEVICE — NDL SPINAL 18GX3.5 SPINOCAN

## (undated) DEVICE — COVER TABLE 44X90 STERILE

## (undated) DEVICE — PADDING WYTEX UNDRCST 6INX4YD

## (undated) DEVICE — UNDERGLOVES BIOGEL PI SIZE 7.5

## (undated) DEVICE — SPONGE LAP 18X18 PREWASHED

## (undated) DEVICE — DRAPE THREE-QTR REINF 53X77IN

## (undated) DEVICE — MANIFOLD 4 PORT

## (undated) DEVICE — KIT DRAPE RIO ONE PIECE W/POCK

## (undated) DEVICE — GLOVE SURG ULTRA TOUCH 8

## (undated) DEVICE — PADDING CAST SPECIALIST 6X4YD

## (undated) DEVICE — SPONGE BULKEE II ABSRB 6X6.75

## (undated) DEVICE — SUT STRATAFIX SPRL PS-2 3-0

## (undated) DEVICE — BLADE SAG DUAL 18MMX1.27MMX90M

## (undated) DEVICE — PIN BONE 4 X 140MM STERILE
Type: IMPLANTABLE DEVICE | Site: KNEE | Status: NON-FUNCTIONAL
Removed: 2023-08-08

## (undated) DEVICE — PACK CUSTOM UNIV BASIN SLI

## (undated) DEVICE — BNDG COFLEX FOAM LF2 ST 6X5YD

## (undated) DEVICE — LINER SUCTION 3000CC

## (undated) DEVICE — KIT VIZADISC KNEE TRACKING

## (undated) DEVICE — APPLICATOR CHLORAPREP ORN 26ML

## (undated) DEVICE — SOL NACL IRR 1000ML BTL

## (undated) DEVICE — PACK SIRUS BASIC V SURG STRL

## (undated) DEVICE — KIT TRIATHLON TIBIAL SIZER

## (undated) DEVICE — PACK EXTREMITY ORTHOMAX